# Patient Record
Sex: FEMALE | Race: WHITE | NOT HISPANIC OR LATINO | Employment: OTHER | URBAN - METROPOLITAN AREA
[De-identification: names, ages, dates, MRNs, and addresses within clinical notes are randomized per-mention and may not be internally consistent; named-entity substitution may affect disease eponyms.]

---

## 2017-01-03 ENCOUNTER — TELEPHONE (OUTPATIENT)
Dept: FAMILY MEDICINE CLINIC | Facility: CLINIC | Age: 62
End: 2017-01-03

## 2017-01-12 ENCOUNTER — HOSPITAL ENCOUNTER (OUTPATIENT)
Dept: SLEEP MEDICINE | Facility: HOSPITAL | Age: 62
Discharge: HOME | End: 2017-01-12
Admitting: FAMILY MEDICINE

## 2017-01-12 DIAGNOSIS — G47.10 HYPERSOMNIA: ICD-10-CM

## 2017-01-12 PROCEDURE — 95806 SLEEP STUDY UNATT&RESP EFFT: CPT

## 2017-02-03 ENCOUNTER — TELEPHONE (OUTPATIENT)
Dept: SLEEP MEDICINE | Facility: HOSPITAL | Age: 62
End: 2017-02-03

## 2017-02-03 NOTE — TELEPHONE ENCOUNTER
Spoke with pt about hst results. Per pt she did not use omd device the night of hst.  Pt to call [facility] to s/u r/v with dr. landers.

## 2017-02-27 RX ORDER — ESCITALOPRAM OXALATE 10 MG/1
TABLET ORAL
Qty: 90 TABLET | Refills: 3 | Status: SHIPPED | OUTPATIENT
Start: 2017-02-27 | End: 2018-10-05

## 2017-03-03 ENCOUNTER — OFFICE VISIT (OUTPATIENT)
Dept: FAMILY MEDICINE CLINIC | Facility: CLINIC | Age: 62
End: 2017-03-03

## 2017-03-03 VITALS
DIASTOLIC BLOOD PRESSURE: 82 MMHG | HEART RATE: 82 BPM | SYSTOLIC BLOOD PRESSURE: 126 MMHG | RESPIRATION RATE: 18 BRPM | TEMPERATURE: 98.9 F | WEIGHT: 218 LBS | HEIGHT: 65 IN | OXYGEN SATURATION: 97 % | BODY MASS INDEX: 36.32 KG/M2

## 2017-03-03 DIAGNOSIS — J40 BRONCHITIS: ICD-10-CM

## 2017-03-03 DIAGNOSIS — J44.1 CHRONIC OBSTRUCTIVE PULMONARY DISEASE WITH ACUTE EXACERBATION (HCC): Primary | ICD-10-CM

## 2017-03-03 PROCEDURE — 99213 OFFICE O/P EST LOW 20 MIN: CPT | Performed by: FAMILY MEDICINE

## 2017-03-03 RX ORDER — ESCITALOPRAM OXALATE 10 MG/1
TABLET ORAL
COMMUNITY
Start: 2016-12-01 | End: 2017-09-08 | Stop reason: SDUPTHER

## 2017-03-03 RX ORDER — MELATONIN
COMMUNITY
End: 2017-09-08 | Stop reason: SDUPTHER

## 2017-03-03 RX ORDER — CELECOXIB 200 MG/1
CAPSULE ORAL
COMMUNITY
Start: 2016-05-28 | End: 2017-10-20

## 2017-03-03 RX ORDER — PHENOL 1.4 %
AEROSOL, SPRAY (ML) MUCOUS MEMBRANE
COMMUNITY
End: 2019-08-21

## 2017-03-03 RX ORDER — ALBUTEROL SULFATE 90 UG/1
2 AEROSOL, METERED RESPIRATORY (INHALATION) EVERY 4 HOURS PRN
Qty: 1 INHALER | Refills: 11 | Status: SHIPPED | OUTPATIENT
Start: 2017-03-03 | End: 2017-03-04

## 2017-03-03 RX ORDER — ALBUTEROL SULFATE 90 UG/1
AEROSOL, METERED RESPIRATORY (INHALATION)
COMMUNITY
Start: 2016-10-13 | End: 2017-09-08 | Stop reason: SDUPTHER

## 2017-03-03 RX ORDER — LORATADINE 10 MG/1
TABLET ORAL
COMMUNITY
End: 2019-08-21

## 2017-03-03 RX ORDER — ESCITALOPRAM OXALATE 5 MG/1
5 TABLET ORAL
COMMUNITY
End: 2017-09-08 | Stop reason: SDUPTHER

## 2017-03-03 RX ORDER — MUPIROCIN CALCIUM 20 MG/G
CREAM TOPICAL
COMMUNITY
Start: 2016-12-26 | End: 2017-03-10

## 2017-03-03 RX ORDER — MUPIROCIN CALCIUM 20 MG/G
CREAM TOPICAL
COMMUNITY
Start: 2016-12-26 | End: 2017-09-08 | Stop reason: SDUPTHER

## 2017-03-03 NOTE — PROGRESS NOTES
"Subjective   Emerita Matthews is a 61 y.o. female.     History of Present Illness   Congested cough began a week ago.   in hosp with RSV- his CXR and CT clear, prob \pneumonia.No sore throat. Fjorehead hurts but only when she coughs. No wheeze. No Barboza. Needs combivent and Advair.    The following portions of the patient's history were reviewed and updated as appropriate: allergies, current medications, past social history and problem list.    Review of Systems   Constitutional: Positive for fatigue. Negative for activity change and unexpected weight change.   HENT: Positive for congestion, postnasal drip and sore throat. Negative for ear pain.    Eyes: Negative for pain and discharge.   Respiratory: Positive for cough, shortness of breath and wheezing. Negative for chest tightness.    Cardiovascular: Negative for chest pain and palpitations.   Gastrointestinal: Negative for abdominal pain, diarrhea and vomiting.   Endocrine: Negative.    Genitourinary: Negative.    Musculoskeletal: Negative for joint swelling.   Skin: Negative for color change, rash and wound.   Allergic/Immunologic: Negative.    Neurological: Negative for seizures and syncope.   Psychiatric/Behavioral: Negative.        Objective   Visit Vitals   • /82   • Pulse 82   • Temp 98.9 °F (37.2 °C)   • Resp 18   • Ht 65\" (165.1 cm)   • Wt 218 lb (98.9 kg)   • SpO2 97%   • BMI 36.28 kg/m2     Physical Exam   Constitutional: She appears well-developed and well-nourished.   HENT:   Head: Normocephalic and atraumatic.   Right Ear: Tympanic membrane, external ear and ear canal normal.   Left Ear: Tympanic membrane, external ear and ear canal normal.   Mouth/Throat: Oropharynx is clear and moist.   Eyes: Conjunctivae are normal. Right eye exhibits no discharge. Left eye exhibits no discharge.   Neck: Normal range of motion. Neck supple. No thyromegaly present.   Cardiovascular: Normal rate, regular rhythm and normal heart sounds.    Pulmonary/Chest: " Effort normal and breath sounds normal. No respiratory distress. She has no wheezes. She has no rales.   Lymphadenopathy:     She has no cervical adenopathy.   Skin: Skin is warm and dry.   Psychiatric: She has a normal mood and affect. Judgment normal.   Vitals reviewed.      Assessment/Plan   Problem List Items Addressed This Visit     None      Visit Diagnoses     Chronic obstructive pulmonary disease with acute exacerbation    -  Primary    Relevant Medications    albuterol (PROVENTIL HFA;VENTOLIN HFA) 108 (90 BASE) MCG/ACT inhaler    fluticasone-salmeterol (ADVAIR DISKUS) 250-50 MCG/DOSE DISKUS    loratadine (CLARITIN) 10 MG tablet    HYDROcodone-homatropine (HYCODAN) 5-1.5 MG/5ML syrup    fluticasone-salmeterol (ADVAIR DISKUS) 250-50 MCG/DOSE DISKUS    ipratropium-albuterol (COMBIVENT)  MCG/ACT inhaler    Bronchitis        Relevant Medications    albuterol (PROVENTIL HFA;VENTOLIN HFA) 108 (90 BASE) MCG/ACT inhaler    fluticasone-salmeterol (ADVAIR DISKUS) 250-50 MCG/DOSE DISKUS    loratadine (CLARITIN) 10 MG tablet    HYDROcodone-homatropine (HYCODAN) 5-1.5 MG/5ML syrup    fluticasone-salmeterol (ADVAIR DISKUS) 250-50 MCG/DOSE DISKUS    ipratropium-albuterol (COMBIVENT)  MCG/ACT inhaler

## 2017-03-10 DIAGNOSIS — J44.1 CHRONIC OBSTRUCTIVE PULMONARY DISEASE WITH ACUTE EXACERBATION (HCC): ICD-10-CM

## 2017-09-08 ENCOUNTER — OFFICE VISIT (OUTPATIENT)
Dept: FAMILY MEDICINE CLINIC | Facility: CLINIC | Age: 62
End: 2017-09-08

## 2017-09-08 VITALS
WEIGHT: 221 LBS | OXYGEN SATURATION: 98 % | TEMPERATURE: 99.3 F | HEART RATE: 86 BPM | HEIGHT: 65 IN | DIASTOLIC BLOOD PRESSURE: 70 MMHG | SYSTOLIC BLOOD PRESSURE: 110 MMHG | BODY MASS INDEX: 36.82 KG/M2 | RESPIRATION RATE: 17 BRPM

## 2017-09-08 DIAGNOSIS — J40 BRONCHITIS: ICD-10-CM

## 2017-09-08 DIAGNOSIS — J45.41 REACTIVE AIRWAY DISEASE, MODERATE PERSISTENT, WITH ACUTE EXACERBATION: Primary | ICD-10-CM

## 2017-09-08 DIAGNOSIS — J44.1 CHRONIC OBSTRUCTIVE PULMONARY DISEASE WITH ACUTE EXACERBATION (HCC): ICD-10-CM

## 2017-09-08 PROCEDURE — 99213 OFFICE O/P EST LOW 20 MIN: CPT | Performed by: FAMILY MEDICINE

## 2017-09-08 RX ORDER — PHENOL 1.4 %
AEROSOL, SPRAY (ML) MUCOUS MEMBRANE
COMMUNITY
End: 2017-09-08 | Stop reason: SDUPTHER

## 2017-09-08 RX ORDER — ALBUTEROL SULFATE 90 UG/1
AEROSOL, METERED RESPIRATORY (INHALATION)
COMMUNITY
Start: 2016-10-13 | End: 2017-09-08 | Stop reason: SDUPTHER

## 2017-09-08 RX ORDER — ALBUTEROL SULFATE 90 UG/1
2 AEROSOL, METERED RESPIRATORY (INHALATION) EVERY 4 HOURS PRN
Qty: 1 INHALER | Refills: 11 | Status: SHIPPED | OUTPATIENT
Start: 2017-09-08 | End: 2017-09-08

## 2017-09-08 RX ORDER — LORATADINE 10 MG/1
TABLET ORAL
COMMUNITY
End: 2017-09-08 | Stop reason: SDUPTHER

## 2017-09-08 RX ORDER — MELATONIN
COMMUNITY
End: 2017-09-08 | Stop reason: SDUPTHER

## 2017-09-08 RX ORDER — CELECOXIB 200 MG/1
CAPSULE ORAL
COMMUNITY
Start: 2016-05-28 | End: 2017-09-08 | Stop reason: SDUPTHER

## 2017-09-08 RX ORDER — MUPIROCIN CALCIUM 20 MG/G
CREAM TOPICAL
COMMUNITY
Start: 2016-12-26 | End: 2019-08-21

## 2017-09-08 RX ORDER — ESCITALOPRAM OXALATE 5 MG/1
5 TABLET ORAL
COMMUNITY
End: 2017-09-08 | Stop reason: SDUPTHER

## 2017-09-08 RX ORDER — AZITHROMYCIN 250 MG/1
TABLET, FILM COATED ORAL
Qty: 6 TABLET | Refills: 0 | Status: SHIPPED | OUTPATIENT
Start: 2017-09-08 | End: 2017-10-20

## 2017-09-08 RX ORDER — ESCITALOPRAM OXALATE 10 MG/1
TABLET ORAL
COMMUNITY
Start: 2016-12-01 | End: 2017-09-08 | Stop reason: SDUPTHER

## 2017-09-08 NOTE — PROGRESS NOTES
"Subjective   Emerita Matthews is a 62 y.o. female.     History of Present Illness  Here with a URI. Everyone at home is ill. Sore throat first for three days. Nasal discharge clear. Cough started yesterday and severe in middle night. HA behind eyes. Coughing causes pain behind eyes, and has HA top of head.  Wheezed last night. Has no inhalers at present.  Never smoked.      The following portions of the patient's history were reviewed and updated as appropriate: allergies, current medications, past social history and problem list.    Review of Systems    Objective   /70  Pulse 86  Temp 99.3 °F (37.4 °C)  Resp 17  Ht 65\" (165.1 cm)  Wt 221 lb (100 kg)  SpO2 98%  BMI 36.78 kg/m2  Physical Exam   Constitutional: She appears well-developed and well-nourished.   HENT:   Head: Normocephalic and atraumatic.   Right Ear: Tympanic membrane, external ear and ear canal normal.   Left Ear: Tympanic membrane, external ear and ear canal normal.   Mouth/Throat: Oropharynx is clear and moist.   Eyes: Conjunctivae are normal. Right eye exhibits no discharge. Left eye exhibits no discharge.   Neck: Normal range of motion. Neck supple. No thyromegaly present.   Cardiovascular: Normal rate, regular rhythm and normal heart sounds.    Pulmonary/Chest: Effort normal. No respiratory distress. She has wheezes (expiratory wheeze cheo with forced expiration). She has no rales.   Lymphadenopathy:     She has no cervical adenopathy.   Skin: Skin is warm and dry.   Psychiatric: She has a normal mood and affect. Judgment normal.   Vitals reviewed.      Assessment/Plan   Problem List Items Addressed This Visit     None      Visit Diagnoses     Reactive airway disease, moderate persistent, with acute exacerbation    -  Primary    Chronic obstructive pulmonary disease with acute exacerbation        Relevant Medications    ipratropium-albuterol (COMBIVENT)  MCG/ACT inhaler    fluticasone-salmeterol (ADVAIR DISKUS) 250-50 MCG/DOSE DISKUS "    Bronchitis        Relevant Medications    ipratropium-albuterol (COMBIVENT)  MCG/ACT inhaler    azithromycin (ZITHROMAX) 250 MG tablet    fluticasone-salmeterol (ADVAIR DISKUS) 250-50 MCG/DOSE DISKUS           told to keep inhalers on hand at all times for this sort of situation, and sudden episodes of bronchospasm

## 2017-09-11 ENCOUNTER — TELEPHONE (OUTPATIENT)
Dept: FAMILY MEDICINE CLINIC | Facility: CLINIC | Age: 62
End: 2017-09-11

## 2017-09-11 DIAGNOSIS — J40 BRONCHITIS: Primary | ICD-10-CM

## 2017-09-11 RX ORDER — PREDNISONE 10 MG/1
TABLET ORAL
Qty: 18 TABLET | Refills: 0 | Status: SHIPPED | OUTPATIENT
Start: 2017-09-11 | End: 2017-10-20

## 2017-09-11 NOTE — TELEPHONE ENCOUNTER
Patient was seen called stated she was seen on Friday was told she would have a cough syrup sent to pharmacy that she never received and this morning states her cough is much worse after weekend on antibiotic. Wants to know if she needs to be seen again? What about the cough syrup? Can you phone her in something else?  Not feeling any better.

## 2017-10-20 ENCOUNTER — OFFICE VISIT (OUTPATIENT)
Dept: FAMILY MEDICINE CLINIC | Facility: CLINIC | Age: 62
End: 2017-10-20

## 2017-10-20 VITALS
DIASTOLIC BLOOD PRESSURE: 78 MMHG | HEART RATE: 76 BPM | WEIGHT: 221 LBS | HEIGHT: 65 IN | RESPIRATION RATE: 18 BRPM | SYSTOLIC BLOOD PRESSURE: 118 MMHG | OXYGEN SATURATION: 100 % | BODY MASS INDEX: 36.82 KG/M2

## 2017-10-20 DIAGNOSIS — J45.20 MILD INTERMITTENT REACTIVE AIRWAY DISEASE WITHOUT COMPLICATION: ICD-10-CM

## 2017-10-20 DIAGNOSIS — Z78.0 ASYMPTOMATIC MENOPAUSAL STATE: ICD-10-CM

## 2017-10-20 DIAGNOSIS — B35.9 TINEA: ICD-10-CM

## 2017-10-20 DIAGNOSIS — Z00.00 ANNUAL PHYSICAL EXAM: Primary | ICD-10-CM

## 2017-10-20 DIAGNOSIS — Z12.4 ROUTINE CERVICAL SMEAR: ICD-10-CM

## 2017-10-20 DIAGNOSIS — Z23 ENCOUNTER FOR IMMUNIZATION: ICD-10-CM

## 2017-10-20 DIAGNOSIS — E55.9 VITAMIN D DEFICIENCY: ICD-10-CM

## 2017-10-20 DIAGNOSIS — E55.9 HYPOVITAMINOSIS D: ICD-10-CM

## 2017-10-20 DIAGNOSIS — Z12.11 COLON CANCER SCREENING: ICD-10-CM

## 2017-10-20 DIAGNOSIS — Z12.39 SCREENING FOR BREAST CANCER: ICD-10-CM

## 2017-10-20 PROBLEM — J45.909 REACTIVE AIRWAY DISEASE: Status: ACTIVE | Noted: 2017-10-20

## 2017-10-20 LAB
DEVELOPER EXPIRATION DATE: NORMAL
DEVELOPER LOT NUMBER: NORMAL
EXPIRATION DATE: NORMAL
FECAL OCCULT BLOOD SCREEN, POC: NEGATIVE
Lab: NORMAL
NEGATIVE CONTROL: NEGATIVE
POSITIVE CONTROL: POSITIVE

## 2017-10-20 PROCEDURE — 90732 PPSV23 VACC 2 YRS+ SUBQ/IM: CPT | Performed by: FAMILY MEDICINE

## 2017-10-20 PROCEDURE — 90656 IIV3 VACC NO PRSV 0.5 ML IM: CPT | Performed by: FAMILY MEDICINE

## 2017-10-20 PROCEDURE — 82274 ASSAY TEST FOR BLOOD FECAL: CPT | Performed by: FAMILY MEDICINE

## 2017-10-20 PROCEDURE — 90472 IMMUNIZATION ADMIN EACH ADD: CPT | Performed by: FAMILY MEDICINE

## 2017-10-20 PROCEDURE — 99396 PREV VISIT EST AGE 40-64: CPT | Performed by: FAMILY MEDICINE

## 2017-10-20 PROCEDURE — 90471 IMMUNIZATION ADMIN: CPT | Performed by: FAMILY MEDICINE

## 2017-10-20 RX ORDER — ALBUTEROL SULFATE 90 UG/1
AEROSOL, METERED RESPIRATORY (INHALATION)
Qty: 1 INHALER | Refills: 0 | COMMUNITY
Start: 2017-10-20 | End: 2019-08-21

## 2017-10-20 NOTE — PROGRESS NOTES
"Subjective   Emerita Matthews is a 62 y.o. female.     History of Present Illness Here for a CPE.  Last Pap was 3 yr ago.    Gets instant diarrhea when goes out, but not on a reg basis.  Wonders why her last dx was COPD. She had thought her dx was asthma.  Only uses Advair when she gets a cough.This happened maybe 2 times in the last year.  Had been started on combivent by Dr Kim in maybe 2001.  Never has smoked and no hx of lung dis. No RIZZO.    3 mo ago fell and R hand near the first and second digits were swollen and bruised.   Doing better now.    Not exercising.     The following portions of the patient's history were reviewed and updated as appropriate: allergies, current medications, past social history and problem list.    Review of Systems   Constitutional: Negative for appetite change, fever and unexpected weight change.   HENT: Negative for ear pain, facial swelling and sore throat.    Eyes: Negative for pain and visual disturbance.   Respiratory: Negative for chest tightness, shortness of breath and wheezing.    Cardiovascular: Negative for chest pain and palpitations.   Gastrointestinal: Negative for abdominal pain and blood in stool.   Endocrine: Negative.    Genitourinary: Negative for difficulty urinating and hematuria.   Musculoskeletal: Negative for joint swelling.   Neurological: Negative for tremors, seizures and syncope.   Hematological: Negative for adenopathy.   Psychiatric/Behavioral: Negative.        Objective   /78  Pulse 76  Resp 18  Ht 65\" (165.1 cm)  Wt 221 lb (100 kg)  SpO2 100%  BMI 36.78 kg/m2  Physical Exam   Constitutional: She is oriented to person, place, and time. She appears well-developed and well-nourished. No distress.   HENT:   Head: Normocephalic and atraumatic. Hair is normal.   Right Ear: Hearing, tympanic membrane, external ear and ear canal normal. No drainage. No decreased hearing is noted.   Left Ear: Hearing, tympanic membrane, external ear and ear canal " normal. No decreased hearing is noted.   Nose: No nasal deformity.   Mouth/Throat: Oropharynx is clear and moist.   Eyes: Conjunctivae, EOM and lids are normal. Pupils are equal, round, and reactive to light. Right eye exhibits no discharge. Left eye exhibits no discharge.   Neck: Normal range of motion. Neck supple. No JVD present. No tracheal deviation present. No thyromegaly present.   Cardiovascular: Normal rate, regular rhythm, normal heart sounds, intact distal pulses and normal pulses.  Exam reveals no gallop and no friction rub.    No murmur heard.  Pulmonary/Chest: Effort normal and breath sounds normal. No respiratory distress. She has no wheezes. She has no rales. She exhibits no tenderness. Right breast exhibits no mass, no skin change and no tenderness. Left breast exhibits no mass, no skin change and no tenderness.   Abdominal: Soft. Bowel sounds are normal. She exhibits no distension and no mass. There is no tenderness. There is no rebound and no guarding. No hernia.   Genitourinary: Vagina normal and uterus normal.   Genitourinary Comments: Atrophic changes. SPP performed   Musculoskeletal: Normal range of motion. She exhibits no edema, tenderness or deformity.   Lymphadenopathy:     She has no cervical adenopathy.   Neurological: She is alert and oriented to person, place, and time. She has normal reflexes. She displays normal reflexes. No cranial nerve deficit. She exhibits normal muscle tone. Coordination normal.   Skin: Skin is warm and dry. No rash noted. She is not diaphoretic. No erythema.   Psychiatric: She has a normal mood and affect. Her behavior is normal. Judgment and thought content normal.   Vitals reviewed.      Assessment/Plan   Problem List Items Addressed This Visit        Respiratory    Reactive airway disease    Relevant Medications    albuterol (PROVENTIL HFA;VENTOLIN HFA) 108 (90 Base) MCG/ACT inhaler       Digestive    Vitamin D deficiency      Other Visit Diagnoses     Annual  physical exam    -  Primary    Relevant Orders    CBC & Differential    Comprehensive Metabolic Panel    Lipid Panel With / Chol / HDL Ratio    TSH    Hepatitis C Antibody    Tinea        Relevant Medications    triamcinolone (KENALOG) 0.1 % ointment    Colon cancer screening        Relevant Orders    POC Occult Blood Stool    Hypovitaminosis D        Relevant Orders    Vitamin D 25 Hydroxy    Asymptomatic menopausal state        Relevant Orders    DEXA Bone Density Axial    Screening for breast cancer        Relevant Orders    Mammo Screening Bilateral With CAD    Encounter for immunization        Relevant Orders    Flu Vaccine Quad PF >18YR    Pneumococcal Polysaccharide Vaccine 23-Valent Greater Than or Equal To 3yo Subcutaneous / IM    Routine cervical smear        Relevant Orders    Pap IG, Ct-Ng - ThinPrep Vial, Cervix           Get records from Dr Lencho manuel of COPD.  Encourage exercise

## 2017-10-21 LAB — HCV AB S/CO SERPL IA: 0.1 S/CO RATIO (ref 0–0.9)

## 2017-10-23 DIAGNOSIS — D64.9 ANEMIA, UNSPECIFIED TYPE: Primary | ICD-10-CM

## 2017-10-23 LAB
25(OH)D3+25(OH)D2 SERPL-MCNC: 31.9 NG/ML (ref 30–100)
ALBUMIN SERPL-MCNC: 4.2 G/DL (ref 3.5–5.2)
ALBUMIN/GLOB SERPL: 1.4 G/DL
ALP SERPL-CCNC: 56 U/L (ref 39–117)
ALT SERPL-CCNC: 23 U/L (ref 1–33)
AST SERPL-CCNC: 17 U/L (ref 1–32)
BILIRUB SERPL-MCNC: 0.5 MG/DL (ref 0.1–1.2)
BUN SERPL-MCNC: 13 MG/DL (ref 8–23)
BUN/CREAT SERPL: 15.7 (ref 7–25)
CALCIUM SERPL-MCNC: 9.5 MG/DL (ref 8.6–10.5)
CHLORIDE SERPL-SCNC: 103 MMOL/L (ref 98–107)
CHOLEST SERPL-MCNC: 182 MG/DL (ref 0–200)
CHOLEST/HDLC SERPL: 2.53 {RATIO}
CO2 SERPL-SCNC: 27.2 MMOL/L (ref 22–29)
CREAT SERPL-MCNC: 0.83 MG/DL (ref 0.57–1)
GFR SERPLBLD CREATININE-BSD FMLA CKD-EPI: 70 ML/MIN/1.73
GFR SERPLBLD CREATININE-BSD FMLA CKD-EPI: 84 ML/MIN/1.73
GLOBULIN SER CALC-MCNC: 3 GM/DL
GLUCOSE SERPL-MCNC: 97 MG/DL (ref 65–99)
HCT VFR BLD AUTO: (no result) %
HDLC SERPL-MCNC: 72 MG/DL (ref 40–60)
HGB BLD-MCNC: (no result) G/DL
LDLC SERPL CALC-MCNC: 96 MG/DL (ref 0–100)
PLATELET # BLD AUTO: (no result) 10*3/UL
POTASSIUM SERPL-SCNC: 5.1 MMOL/L (ref 3.5–5.2)
PROT SERPL-MCNC: 7.2 G/DL (ref 6–8.5)
RBC # BLD AUTO: (no result) 10*6/UL
REQUEST PROBLEM: NORMAL
SODIUM SERPL-SCNC: 143 MMOL/L (ref 136–145)
TRIGL SERPL-MCNC: 68 MG/DL (ref 0–150)
TSH SERPL DL<=0.005 MIU/L-ACNC: 1.24 MIU/ML (ref 0.27–4.2)
VLDLC SERPL CALC-MCNC: 13.6 MG/DL (ref 5–40)
WBC # BLD AUTO: (no result) 10*3/MM3

## 2017-10-25 ENCOUNTER — RESULTS ENCOUNTER (OUTPATIENT)
Dept: FAMILY MEDICINE CLINIC | Facility: CLINIC | Age: 62
End: 2017-10-25

## 2017-10-25 DIAGNOSIS — Z00.00 ANNUAL PHYSICAL EXAM: ICD-10-CM

## 2017-10-26 LAB
C TRACH RRNA CVX QL NAA+PROBE: NEGATIVE
CYTOLOGIST CVX/VAG CYTO: NORMAL
CYTOLOGY CVX/VAG DOC THIN PREP: NORMAL
DX ICD CODE: NORMAL
HIV 1 & 2 AB SER-IMP: NORMAL
N GONORRHOEA RRNA CVX QL NAA+PROBE: NEGATIVE
OTHER STN SPEC: NORMAL
PATH REPORT.FINAL DX SPEC: NORMAL
STAT OF ADQ CVX/VAG CYTO-IMP: NORMAL

## 2017-11-02 ENCOUNTER — TELEPHONE (OUTPATIENT)
Dept: FAMILY MEDICINE CLINIC | Facility: CLINIC | Age: 62
End: 2017-11-02

## 2017-12-01 ENCOUNTER — TELEPHONE (OUTPATIENT)
Dept: FAMILY MEDICINE CLINIC | Facility: CLINIC | Age: 62
End: 2017-12-01

## 2017-12-01 ENCOUNTER — HOSPITAL ENCOUNTER (OUTPATIENT)
Dept: BONE DENSITY | Facility: HOSPITAL | Age: 62
Discharge: HOME | End: 2017-12-01
Admitting: FAMILY MEDICINE

## 2017-12-01 ENCOUNTER — HOSPITAL ENCOUNTER (OUTPATIENT)
Dept: MAMMOGRAPHY | Facility: HOSPITAL | Age: 62
Discharge: HOME | End: 2017-12-01

## 2017-12-01 DIAGNOSIS — Z78.0 ASYMPTOMATIC MENOPAUSAL STATE: ICD-10-CM

## 2017-12-01 DIAGNOSIS — Z12.39 SCREENING FOR BREAST CANCER: ICD-10-CM

## 2017-12-01 PROCEDURE — 77080 DXA BONE DENSITY AXIAL: CPT

## 2017-12-01 PROCEDURE — G0202 SCR MAMMO BI INCL CAD: HCPCS

## 2017-12-02 LAB
BASOPHILS # BLD AUTO: 0.01 10*3/MM3 (ref 0–0.2)
BASOPHILS NFR BLD AUTO: 0.2 % (ref 0–1.5)
DIFFERENTIAL COMMENT: NORMAL
EOSINOPHIL # BLD AUTO: 0.18 10*3/MM3 (ref 0–0.7)
EOSINOPHIL NFR BLD AUTO: 3.1 % (ref 0.3–6.2)
ERYTHROCYTE [DISTWIDTH] IN BLOOD BY AUTOMATED COUNT: 14.5 % (ref 11.7–13)
HBA1C MFR BLD: 5.37 % (ref 4.8–5.6)
HCT VFR BLD AUTO: 45.9 % (ref 35.6–45.5)
HGB BLD-MCNC: 14.6 G/DL (ref 11.9–15.5)
IMM GRANULOCYTES # BLD: 0 10*3/MM3 (ref 0–0.03)
IMM GRANULOCYTES NFR BLD: 0 % (ref 0–0.5)
LYMPHOCYTES # BLD AUTO: 2.17 10*3/MM3 (ref 0.9–4.8)
LYMPHOCYTES NFR BLD AUTO: 37 % (ref 19.6–45.3)
MCH RBC QN AUTO: 29.5 PG (ref 26.9–32)
MCHC RBC AUTO-ENTMCNC: 31.8 G/DL (ref 32.4–36.3)
MCV RBC AUTO: 92.7 FL (ref 80.5–98.2)
MONOCYTES # BLD AUTO: 0.51 10*3/MM3 (ref 0.2–1.2)
MONOCYTES NFR BLD AUTO: 8.7 % (ref 5–12)
NEUTROPHILS # BLD AUTO: 2.99 10*3/MM3 (ref 1.9–8.1)
NEUTROPHILS NFR BLD AUTO: 51 % (ref 42.7–76)
NRBC BLD AUTO-RTO: 0 /100 WBC (ref 0–0)
PLATELET # BLD AUTO: 211 10*3/MM3 (ref 140–500)
PLATELET BLD QL SMEAR: NORMAL
RBC # BLD AUTO: 4.95 10*6/MM3 (ref 3.9–5.2)
RBC MORPH BLD: NORMAL
WBC # BLD AUTO: 5.86 10*3/MM3 (ref 4.5–10.7)

## 2018-01-23 ENCOUNTER — TRANSCRIBE ORDERS (OUTPATIENT)
Dept: OCCUPATIONAL THERAPY | Facility: CLINIC | Age: 63
End: 2018-01-23

## 2018-01-23 ENCOUNTER — TREATMENT (OUTPATIENT)
Dept: PHYSICAL THERAPY | Facility: CLINIC | Age: 63
End: 2018-01-23

## 2018-01-23 DIAGNOSIS — IMO0001 CLOSED TRAUMATIC PIP DISLOCATION, INITIAL ENCOUNTER: Primary | ICD-10-CM

## 2018-01-23 DIAGNOSIS — IMO0001: ICD-10-CM

## 2018-01-23 DIAGNOSIS — S63.259D DISLOCATION OF FINGER, SUBSEQUENT ENCOUNTER: Primary | ICD-10-CM

## 2018-01-23 PROCEDURE — 97530 THERAPEUTIC ACTIVITIES: CPT | Performed by: PHYSICAL THERAPIST

## 2018-01-23 PROCEDURE — 97110 THERAPEUTIC EXERCISES: CPT | Performed by: PHYSICAL THERAPIST

## 2018-01-23 PROCEDURE — 97140 MANUAL THERAPY 1/> REGIONS: CPT | Performed by: PHYSICAL THERAPIST

## 2018-01-23 PROCEDURE — 97001 PR PHYS THERAPY EVALUATION: CPT | Performed by: PHYSICAL THERAPIST

## 2018-01-23 NOTE — PROGRESS NOTES
Orthopedic / Sports / Industrial Physical Therapy  Physical Therapy Initial Evaluation and Plan of Care    Patient Name: Emerita Matthews          :  1955  Referring Physician: NOEMI Jaimes  Diagnosis: Dislocation of finger, subsequent encounter [S64.646A]    Date of Evaluation: 2018  ______________________________________________________________________    Subjective Evaluation    History of Present Illness  Date of onset: 2017  Mechanism of injury: Tripped and fell at work - [employer] -     S/P Dislocation / Reduction LRF PIP Jt      Patient Occupation: Dental assistant - for 42 yrs   Precautions and Work Restrictions: See MD note - Pain  Current pain ratin  At worst pain ratin  Location: LRF and LSF and hand (unlar aspect) - Denies feeling of instability -   Quality: Achiness; Soreness -   Alleviating factors: Rest.  Exacerbated by: Use of (L) Hand  - especially Gripping.  Progression: improved    Diagnostic Tests  X-ray: normal (Initial X-Ray showed dislocated PIP Jt LRF; Subsequent XRays were WNL with good alignment of LRF PIP Jt - )    Patient Goals  Patient/family treatment goals: Pain alleviation; Normal mobility, strength, function, and RTW w/o restrictions -          ___________________________________________________  Objective       Observations     Additional Observation Details  Flexed PIPJ and swollen LRF    Active Range of Motion     Additional Active Range of Motion Details  LRF :  MPJ:  WF/NL             PIPJ:  15/90-deg             DIPJ:   0/60-deg    All other Finger, Wrist, Forearm AROM WNL     Strength/Myotome Testing     Additional Strength Details  :  (L) 23#;  (R) 43#    Tests     Additional Tests Details  LRF PIPJ;  (-) Valgus / Varus; (-) HE stress testing    Swelling     Left Wrist/Hand     Additional Swelling Details  LRF PIP:  6.5cm;    RRF PIP: 5.5cm     MODALITIES: E-Stim (IFC) (L) Hand / (MN/UN) w/ MHP x 20 min  MANUAL THERAPY: x 15: Edema mobilization (L)  fingers/hand; STM to (L) hand / forearm;   THERAPEUTIC EXERCISE: x 25 min; B/B LRF DIP/PIP;  Tendon Gliding (3) x 20 ea; Hand therapy Orbs x 10 min; Rice work x 10 min  FUNCTIONAL ACTIVITIES: X 10 min  · TAPING / BRACING:   · Jt protection, ADL modification; Posture and ; Stretching modifications to prevent future dislocation;     ___________________________________________________  Assessment & Plan     Assessment  Assessment details: S/P LRF PIPJ Dislocation / Reduction 12/18/2017    PROBLEMS: Pain; limited mobility, strength, function, and unable to do normal job -   PROGNOSIS: Good    GOALS:   SHORT TERM GOALS: 2 weeks:  1) HEP Initiated; 2) Pain decreased 50%:   3) AROM  increased:  4) Improved functional ability grossly;     LONG TERM GOALS: 4 weeks (or at time of DISCHARGE): 1) (I) HEP; 2) AROM WFL and pain free; 3) Strength / mobility to be able to perform all ADL's and job-related activities w/o restrictions;       Plan  Planned therapy interventions: flexibility, home exercise program, joint mobilization, manual therapy, neuromuscular re-education, orthotic fitting/training, soft tissue mobilization, strengthening, stretching and therapeutic activities (Modalities prn; Taping / bracing prn; )  Frequency: 3x week  Duration in weeks: 4  Treatment plan discussed with: patient    ___________________________________________________  Manual Therapy:    15     mins  74174;   Therapeutic Exercise:    25     mins  58849;     Neuromuscular Carl:        mins  55456;   Therapeutic Activity:     10     mins  19141;     Ultrasound:          mins  29428;    Electrical Stimulation:   20     mins  54341 ( );  Dry Needling          mins self-pay   Gait Training:          mins  56129;  EVAL TIME:   25 mins    Timed Treatment:   50   mins                Total Treatment:     105   mins    PT SIGNATURE:   Sony Thomas, PIPPA  DATE TREATMENT INITIATED:  1/23/2018  ___________________________________________________  Initial Certification  Certification Period: 4/23/2018  I certify that the therapy services are furnished while this patient is under my care.  The services outlined above are required by this patient, and will be reviewed every 90 days.     PHYSICIAN: ________________________________  DATE: ______  NOEMI Jaimes        Please sign and return via fax to [phone].. Thank you, [facility].  ______________________________________________________________________  [address] [place] [address]  Phone: [phone] Fax: [phone]

## 2018-01-26 ENCOUNTER — TREATMENT (OUTPATIENT)
Dept: PHYSICAL THERAPY | Facility: CLINIC | Age: 63
End: 2018-01-26

## 2018-01-26 DIAGNOSIS — IMO0001: Primary | ICD-10-CM

## 2018-01-26 DIAGNOSIS — S63.259D DISLOCATION OF FINGER, SUBSEQUENT ENCOUNTER: ICD-10-CM

## 2018-01-26 PROCEDURE — 97140 MANUAL THERAPY 1/> REGIONS: CPT | Performed by: PHYSICAL THERAPIST

## 2018-01-26 PROCEDURE — 97110 THERAPEUTIC EXERCISES: CPT | Performed by: PHYSICAL THERAPIST

## 2018-01-26 PROCEDURE — 97018 PARAFFIN BATH THERAPY: CPT | Performed by: PHYSICAL THERAPIST

## 2018-01-29 ENCOUNTER — TREATMENT (OUTPATIENT)
Dept: PHYSICAL THERAPY | Facility: CLINIC | Age: 63
End: 2018-01-29

## 2018-01-29 DIAGNOSIS — IMO0001: Primary | ICD-10-CM

## 2018-01-29 DIAGNOSIS — S63.259D DISLOCATION OF FINGER, SUBSEQUENT ENCOUNTER: ICD-10-CM

## 2018-01-29 PROCEDURE — 97140 MANUAL THERAPY 1/> REGIONS: CPT | Performed by: PHYSICAL THERAPIST

## 2018-01-29 PROCEDURE — 97530 THERAPEUTIC ACTIVITIES: CPT | Performed by: PHYSICAL THERAPIST

## 2018-01-29 PROCEDURE — 97110 THERAPEUTIC EXERCISES: CPT | Performed by: PHYSICAL THERAPIST

## 2018-01-29 NOTE — PROGRESS NOTES
Physical Therapy Daily Progress Note    Patient Name: Emerita Matthews         :  1955  Referring Physician: NOEMI Jaimes    Subjective   Emerita Matthews reports: decreasing pain and improved mobility - Most limited with LRF PIPJ extension - Pain in PIP and DIPJ LRF and soreness in LSF diffusely -     Objective   Swelling at PIPJ LRF with flexed posturing -     See Exercise, Manual, and Modality Logs for complete treatment.     Functional / Therapeutic Activities:   min  · TAPING / BRACING: NA  · Jt protection, ADL modification; Posture and      Assessment/Plan  S/P LRF PIPJ Dislocation / Reduction 2017  Improving mobility into flexion with some residual limitation into PIPJ extension LRF     Progress strengthening /stabilization /functional activity     _________________________________________________  Manual Therapy:    15     mins  89272;  Therapeutic Exercise:    30     mins  31102;     Neuromuscular Carl:        mins  81314;    Therapeutic Activity:          mins  63997;     Parafin:      15     mins  75877;     Ultrasound:          mins  92052;    Electrical Stimulation:         mins  05714 ( );  Dry Needling          mins self-pay    Timed Treatment:   60   mins                  Total Treatment:     70   mins    Sony Thomas PT  Physical Therapist

## 2018-01-31 ENCOUNTER — TREATMENT (OUTPATIENT)
Dept: PHYSICAL THERAPY | Facility: CLINIC | Age: 63
End: 2018-01-31

## 2018-01-31 DIAGNOSIS — IMO0001: Primary | ICD-10-CM

## 2018-01-31 DIAGNOSIS — S63.259D DISLOCATION OF FINGER, SUBSEQUENT ENCOUNTER: ICD-10-CM

## 2018-01-31 PROCEDURE — 97110 THERAPEUTIC EXERCISES: CPT | Performed by: PHYSICAL THERAPIST

## 2018-01-31 PROCEDURE — 97014 ELECTRIC STIMULATION THERAPY: CPT | Performed by: PHYSICAL THERAPIST

## 2018-01-31 PROCEDURE — 97530 THERAPEUTIC ACTIVITIES: CPT | Performed by: PHYSICAL THERAPIST

## 2018-02-01 ENCOUNTER — TREATMENT (OUTPATIENT)
Dept: PHYSICAL THERAPY | Facility: CLINIC | Age: 63
End: 2018-02-01

## 2018-02-01 DIAGNOSIS — S63.259D DISLOCATION OF FINGER, SUBSEQUENT ENCOUNTER: ICD-10-CM

## 2018-02-01 DIAGNOSIS — IMO0001: Primary | ICD-10-CM

## 2018-02-01 PROCEDURE — 97014 ELECTRIC STIMULATION THERAPY: CPT | Performed by: PHYSICAL THERAPIST

## 2018-02-01 PROCEDURE — 97530 THERAPEUTIC ACTIVITIES: CPT | Performed by: PHYSICAL THERAPIST

## 2018-02-01 PROCEDURE — 97110 THERAPEUTIC EXERCISES: CPT | Performed by: PHYSICAL THERAPIST

## 2018-02-01 NOTE — PROGRESS NOTES
Physical Therapy Daily Progress Note     Patient Name: Emerita Matthews         :  1955  Referring Physician: NOEMI Jaimes     Subjective   Emerita Matthews reports: increased discomfort LRF and LSF - More active - Most limited with LRF PIPJ extension - Pain in PIP and DIPJ LRF and soreness in LSF diffusely -      Objective   Swelling at PIPJ LRF with flexed posturing -   Able to achieve full LRF PIPJ extension with gentle stretching / mobilization -      See Exercise, Manual, and Modality Logs for complete treatment.      Functional / Therapeutic Activities: 10  min  · TAPING / BRACING: Fitted Pt with static splint dorsally to facilitate LRF PIPJ extension and instructed in use / wearing schedule for night and during the day as needed   · Jt protection, ADL modification; Posture and       Assessment/Plan  S/P LRF PIPJ Dislocation / Reduction 2017  Improving mobility into flexion with some residual limitation into PIPJ extension LRF   May benefit from LRF PIPJ static bracing to facilitate ext     Progress strengthening /stabilization /functional activity  _________________________________________________  Manual Therapy:                      15     mins  28429;  Therapeutic Exercise:              30     mins  52537;     Neuromuscular Carl:        mins  31363;    Therapeutic Activity:               10      mins  62290;     Parafin:                                     15     mins  33316;     Ultrasound:                                      mins  10282;    Electrical Stimulation:                   mins  65960 (MC );  Dry Needling                                           mins self-pay     Timed Treatment:   55   mins                  Total Treatment:     80   mins     Sony Thomas PT  Physical Therapist

## 2018-02-04 NOTE — PROGRESS NOTES
Physical Therapy Daily Progress Note      Patient Name: Emerita Matthews         :  1955  Referring Physician: NOEMI Jaimes      Subjective   Emerita Matthews reports: continued improvement with decreased pain and improved mobility and function.   increased discomfort LRF at night while wearing her brace  Most limited with LRF PIPJ extension - Pain in PIP and DIPJ LRF but better - LRF PIPJ easier to get full extended with less effort  -       Objective   Swelling at PIPJ LRF with flexed posturing -   Able to achieve full LRF PIPJ extension with gentle stretching / mobilization -       See Exercise, Manual, and Modality Logs for complete treatment.      Functional / Therapeutic Activities: 15  min  · TAPING / BRACING: NA  · SEE EXERCISE FLOW SHEET -   · Jt protection, ADL modification; Posture and        Assessment/Plan  S/P LRF PIPJ Dislocation / Reduction 2017  Improving mobility into flexion with some residual limitation into PIPJ extension LRF   May benefit from LRF PIPJ static bracing to facilitate ext      Progress strengthening /stabilization /functional activity  _________________________________________________  Manual Therapy:                      05     mins  10375;  Therapeutic Exercise:              30     mins  83616;     Neuromuscular Carl:                              mins  36257;    Therapeutic Activity:               15      mins  44213;     Parafin:                                          mins  48867;     Ultrasound:                                      mins  69392;    Electrical Stimulation:             20      mins  90038 ( );  Dry Needling                                           mins self-pay      Timed Treatment:   50   mins                  Total Treatment:     80   mins      Sony Thomas PT  Physical Therapist

## 2018-02-04 NOTE — PROGRESS NOTES
Physical Therapy Daily Progress Note      Patient Name: Emerita Matthews         :  1955  Referring Physician: NOEMI Jaimes      Subjective   Emerita Matthews reports: continued improvement with decreased pain and improved mobility and function.   increased discomfort LRF at night while wearing her brace  Most limited with LRF PIPJ extension - Pain in PIP and DIPJ LRF but better - LRF PIPJ easier to get full extended with less effort  -       Objective   Swelling at PIPJ LRF with flexed posturing -   Able to achieve full LRF PIPJ extension with gentle stretching / mobilization -       See Exercise, Manual, and Modality Logs for complete treatment.      Functional / Therapeutic Activities: 15  min  · TAPING / BRACING: NA  · SEE EXERCISE FLOW SHEET -   · Jt protection, ADL modification; Posture and        Assessment/Plan  S/P LRF PIPJ Dislocation / Reduction 2017  Improving mobility into flexion with some residual limitation into PIPJ extension LRF   May benefit from LRF PIPJ static bracing to facilitate ext      Progress strengthening /stabilization /functional activity  _________________________________________________  Manual Therapy:                      05     mins  54016;  Therapeutic Exercise:              30     mins  26883;     Neuromuscular Carl:         mins  19745;    Therapeutic Activity:               15      mins  23569;     Parafin:                                          mins  39274;     Ultrasound:                                      mins  83331;    Electrical Stimulation:             20      mins  26714 ( );  Dry Needling                                           mins self-pay      Timed Treatment:   50   mins                  Total Treatment:     80   mins      Sony Thomas PT  Physical Therapist

## 2018-02-05 ENCOUNTER — TREATMENT (OUTPATIENT)
Dept: PHYSICAL THERAPY | Facility: CLINIC | Age: 63
End: 2018-02-05

## 2018-02-05 DIAGNOSIS — S63.259D DISLOCATION OF FINGER, SUBSEQUENT ENCOUNTER: ICD-10-CM

## 2018-02-05 DIAGNOSIS — IMO0001: Primary | ICD-10-CM

## 2018-02-05 PROCEDURE — 97530 THERAPEUTIC ACTIVITIES: CPT | Performed by: PHYSICAL THERAPIST

## 2018-02-05 PROCEDURE — 97110 THERAPEUTIC EXERCISES: CPT | Performed by: PHYSICAL THERAPIST

## 2018-02-08 NOTE — PROGRESS NOTES
------------------------------------------------------------------------------------------------------   MD PROGRESS NOTE      Patient: Emerita Matthews        : 1955  Diagnosis/ICD-10 Code:  Closed traumatic dislocation of proximal interphalangeal joint, subsequent encounter [S63.289D]  Referring practitioner: NOEMI Jaimes  Date of Initial Visit: 2018                  Today's Date: 2018  _________________________________________________________________    Thank you for the referral of Ms. Matthews to [facility].  Ms. Matthews has attended 6 PT sessions and their treatment has consisted of: modalities prn, manual therapy, therapeutic exercise, therapeutic activiites, bracing, patient education, and HEP.     Subjective   Emerita Matthews reports: improved mobility with persistent flexed posture of LRF PIPJ, but stretches out quickly - Persistent pain in LRF > LSF -  ___________________________________________________________________  Objective              OBSERVATION: Flexed posture of LRF PIPJ with swelling at PIPJ -               PALPATION: Tender LRF PIPJ > DIP        AROM: LRF MPJ WNL:  PIPJ 20 / ; DIP WFL   STRENGTH:  (L): 30#;  (R) 55#   SPECIAL TESTS: (-) Valgus /Varus stress testing -    ACTIVITY TOLERANCE: Improved tolerance to ADL's and job requirements, but persistent pain limiting functional use of LRF / Hand     See Exercise, Manual, and Modality Logs for complete treatment.      Functional / Therapeutic Activities:  X 25 min  · TAPING / BRACING: NA  ·  SEE EXERCISE FLOW SHEET -   ·  FUNCTIONAL ASSESSMENT -   · Jt protection, ADL modification; Posture and    ___________________________________________________________________   Assessment/Plan  S/P LRF PIPJ Dislocation / Reduction 2017  Improving mobility into flexion with some residual limitation into PIPJ extension LRF and persistent pain    Ms. Matthews would benefit from continued Physical Therapy and  referral to Hand Specialists if pain persists - .     P: Pt reports continued improvement with decreased pain and improved mobility and function with  referral to Hand Specialists if pain persists.      Please advise after your exam.    Thank you again for this referral of Ms. Matthews to [facility].    PT Signature: ______________________________   Sony Thomas, PT    ______________________________________________________________________  [address] [place] [address]  Phone: [phone] Fax: [phone]

## 2018-03-02 ENCOUNTER — TREATMENT (OUTPATIENT)
Dept: PHYSICAL THERAPY | Facility: CLINIC | Age: 63
End: 2018-03-02

## 2018-03-02 ENCOUNTER — TRANSCRIBE ORDERS (OUTPATIENT)
Dept: PHYSICAL THERAPY | Facility: CLINIC | Age: 63
End: 2018-03-02

## 2018-03-02 DIAGNOSIS — S63.259D DISLOCATION OF FINGER, SUBSEQUENT ENCOUNTER: Primary | ICD-10-CM

## 2018-03-02 DIAGNOSIS — IMO0001: ICD-10-CM

## 2018-03-02 PROCEDURE — L3925 FO PIP DIP JNT/SPRNG PRE OTS: HCPCS | Performed by: PHYSICAL THERAPIST

## 2018-03-05 NOTE — PROGRESS NOTES
Hand Therapy Splint Evaluation and Plan of Treatment    Patient Name: Emerita Matthews       Patient MRN: [id number]  : 1955  Physician: Rodrigo Chiu MD  Date: 3/2/2018  No diagnosis found.    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 Pain persists despite PT.   Objective: AROM: flexion contracture of PIP joint, Sensation is normal. Strength NA secondary to injury.    Splinting:  [x] Patient was measured and fit with a prefabricated Capener splint  [x] Patient was instructed in wearing schedule, precautions and care of the splint during this visit  [x] Patient was instructed in proper donning/doffing of splint    Assessment:  [x] Patient was fitted and appropriate splint was fabricated this date  [x] Patient reported that splint was comfortable and had no complications with the fit of splint  [x] Patient was instructed and patient verbalized understanding of precautions, wear and care of the splint  [x] Patient demonstrated independent donning and doffing of splint during treatment today    Goals:  [x] Patient was fitted properly with appropriate splint for diagnosis  [x] Patient was educated on precautions, wear schedule and care of the splint  [x] Patient demonstrated independence with donning and doffing of splint  [x] Splint was provide to [] protect healing structures [x] increase ROM []  Restrict mobility []  Improve joint alignment      Plan:  [x] No additional treatment is required for this patient at this time. The patient is therefore discharged from therapy.  [x] Patient advised to contact therapist with any additional questions or concerns regarding the fit and function of the splint  [x] Patient will be seen for fitting, adjustments, training in precautions, wear or care of splint    PT SIGNATURE: Gavino Wilkins, PT DPT, DPT  DATE TREATMENT INITIATED: 3/5/2018    Initial Certification    I certify that the  therapy services are furnished while this patient is under my care.  The services outlined above are required by this patient, and will be reviewed every 90 days.     PHYSICIAN:      DATE:     Please sign and return via fax to [phone].. Thank you, [facility].

## 2018-10-05 ENCOUNTER — OFFICE VISIT (OUTPATIENT)
Dept: FAMILY MEDICINE CLINIC | Facility: CLINIC | Age: 63
End: 2018-10-05

## 2018-10-05 ENCOUNTER — FLU SHOT (OUTPATIENT)
Dept: FAMILY MEDICINE CLINIC | Facility: CLINIC | Age: 63
End: 2018-10-05

## 2018-10-05 VITALS
SYSTOLIC BLOOD PRESSURE: 120 MMHG | RESPIRATION RATE: 16 BRPM | HEIGHT: 65 IN | OXYGEN SATURATION: 99 % | BODY MASS INDEX: 35.72 KG/M2 | TEMPERATURE: 98.4 F | DIASTOLIC BLOOD PRESSURE: 70 MMHG | WEIGHT: 214.4 LBS | HEART RATE: 73 BPM

## 2018-10-05 DIAGNOSIS — J45.909 REACTIVE AIRWAY DISEASE WITHOUT COMPLICATION, UNSPECIFIED ASTHMA SEVERITY, UNSPECIFIED WHETHER PERSISTENT: ICD-10-CM

## 2018-10-05 DIAGNOSIS — E55.9 VITAMIN D DEFICIENCY: Primary | ICD-10-CM

## 2018-10-05 DIAGNOSIS — Z23 NEED FOR IMMUNIZATION AGAINST INFLUENZA: Primary | ICD-10-CM

## 2018-10-05 PROCEDURE — 90674 CCIIV4 VAC NO PRSV 0.5 ML IM: CPT | Performed by: INTERNAL MEDICINE

## 2018-10-05 PROCEDURE — 99214 OFFICE O/P EST MOD 30 MIN: CPT | Performed by: INTERNAL MEDICINE

## 2018-10-05 PROCEDURE — 90471 IMMUNIZATION ADMIN: CPT | Performed by: INTERNAL MEDICINE

## 2018-10-05 RX ORDER — FLUTICASONE PROPIONATE 50 MCG
2 SPRAY, SUSPENSION (ML) NASAL DAILY
Qty: 16 G | Refills: 2 | Status: SHIPPED | OUTPATIENT
Start: 2018-10-05 | End: 2019-08-21

## 2018-10-05 NOTE — PATIENT INSTRUCTIONS
Exercising to Lose Weight  Exercising can help you to lose weight. In order to lose weight through exercise, you need to do vigorous-intensity exercise. You can tell that you are exercising with vigorous intensity if you are breathing very hard and fast and cannot hold a conversation while exercising.  Moderate-intensity exercise helps to maintain your current weight. You can tell that you are exercising at a moderate level if you have a higher heart rate and faster breathing, but you are still able to hold a conversation.  How often should I exercise?  Choose an activity that you enjoy and set realistic goals. Your health care provider can help you to make an activity plan that works for you. Exercise regularly as directed by your health care provider. This may include:  · Doing resistance training twice each week, such as:  ? Push-ups.  ? Sit-ups.  ? Lifting weights.  ? Using resistance bands.  · Doing a given intensity of exercise for a given amount of time. Choose from these options:  ? 150 minutes of moderate-intensity exercise every week.  ? 75 minutes of vigorous-intensity exercise every week.  ? A mix of moderate-intensity and vigorous-intensity exercise every week.    Children, pregnant women, people who are out of shape, people who are overweight, and older adults may need to consult a health care provider for individual recommendations. If you have any sort of medical condition, be sure to consult your health care provider before starting a new exercise program.  What are some activities that can help me to lose weight?  · Walking at a rate of at least 4.5 miles an hour.  · Jogging or running at a rate of 5 miles per hour.  · Biking at a rate of at least 10 miles per hour.  · Lap swimming.  · Roller-skating or in-line skating.  · Cross-country skiing.  · Vigorous competitive sports, such as football, basketball, and soccer.  · Jumping rope.  · Aerobic dancing.  How can I be more active in my  day-to-day activities?  · Use the stairs instead of the elevator.  · Take a walk during your lunch break.  · If you drive, park your car farther away from work or school.  · If you take public transportation, get off one stop early and walk the rest of the way.  · Make all of your phone calls while standing up and walking around.  · Get up, stretch, and walk around every 30 minutes throughout the day.  What guidelines should I follow while exercising?  · Do not exercise so much that you hurt yourself, feel dizzy, or get very short of breath.  · Consult your health care provider prior to starting a new exercise program.  · Wear comfortable clothes and shoes with good support.  · Drink plenty of water while you exercise to prevent dehydration or heat stroke. Body water is lost during exercise and must be replaced.  · Work out until you breathe faster and your heart beats faster.  This information is not intended to replace advice given to you by your health care provider. Make sure you discuss any questions you have with your health care provider.  Document Released: 01/20/2012 Document Revised: 05/25/2017 Document Reviewed: 05/21/2015  [employer] Interactive Patient Education © 2018 [employer]      MyPlate from [employer]  The general, healthful diet is based on the 2010 Dietary Guidelines for Americans. The amount of food you need to eat from each food group depends on your age, sex, and level of physical activity and can be individualized by a dietitian. Go to ChooseMyPlate.gov for more information.  What do I need to know about the MyPlate plan?  · Enjoy your food, but eat less.  · Avoid oversized portions.  ? ½ of your plate should include fruits and vegetables.  ? ¼ of your plate should be grains.  ? ¼ of your plate should be protein.  Grains  · Make at least half of your grains whole grains.  · For a 2,000 calorie daily food plan, eat 6 oz every day.  · 1 oz is about 1 slice bread, 1 cup cereal, or ½ cup cooked  rice, cereal, or pasta.  Vegetables  · Make half your plate fruits and vegetables.  · For a 2,000 calorie daily food plan, eat 2½ cups every day.  · 1 cup is about 1 cup raw or cooked vegetables or vegetable juice or 2 cups raw leafy greens.  Fruits  · Make half your plate fruits and vegetables.  · For a 2,000 calorie daily food plan, eat 2 cups every day.  · 1 cup is about 1 cup fruit or 100% fruit juice or ½ cup dried fruit.  Protein  · For a 2,000 calorie daily food plan, eat 5½ oz every day.  · 1 oz is about 1 oz meat, poultry, or fish, ¼ cup cooked beans, 1 egg, 1 Tbsp peanut butter, or ½ oz nuts or seeds.  Dairy  · Switch to fat-free or low-fat (1%) milk.  · For a 2,000 calorie daily food plan, eat 3 cups every day.  · 1 cup is about 1 cup milk or yogurt or soy milk (soy beverage), 1½ oz natural cheese, or 2 oz processed cheese.  Fats, Oils, and Empty Calories  · Only small amounts of oils are recommended.  · Empty calories are calories from solid fats or added sugars.  · Compare sodium in foods like soup, bread, and frozen meals. Choose the foods with lower numbers.  · Drink water instead of sugary drinks.  What foods can I eat?  Grains  Whole grains such as whole wheat, quinoa, millet, and bulgur. Bread, rolls, and pasta made from whole grains. Brown or wild rice. Hot or cold cereals made from whole grains and without added sugar.  Vegetables  All fresh vegetables, especially fresh red, dark green, or orange vegetables. Peas and beans. Low-sodium frozen or canned vegetables prepared without added salt. Low-sodium vegetable juices.  Fruits  All fresh, frozen, and dried fruits. Canned fruit packed in water or fruit juice without added sugar. Fruit juices without added sugar.  Meats and Other Protein Sources  Boiled, baked, or grilled lean meat trimmed of fat. Skinless poultry. Fresh seafood and shellfish. Canned seafood packed in water. Unsalted nuts and unsalted nut butters. Tofu. Dried beans and pea.  Eggs.  Dairy  Low-fat or fat-free milk, yogurt, and cheeses.  Sweets and Desserts  Frozen desserts made from low-fat milk.  Fats and Oils  Olive, peanut, and canola oils and margarine. Salad dressing and mayonnaise made from these oils.  Other  Soups and casseroles made from allowed ingredients and without added fat or salt.  The items listed above may not be a complete list of recommended foods or beverages. Contact your dietitian for more options.  What foods are not recommended?  Grains  Sweetened, low-fiber cereals. Packaged baked goods. Snack crackers and chips. Cheese crackers, butter crackers, and biscuits. Frozen waffles, sweet breads, doughnuts, pastries, packaged baking mixes, pancakes, cakes, and cookies.  Vegetables  Regular canned or frozen vegetables or vegetables prepared with salt. Canned tomatoes. Canned tomato sauce. Fried vegetables. Vegetables in cream sauce or cheese sauce.  Fruits  Fruits packed in syrup or made with added sugar.  Meats and Other Protein Sources  Marbled or fatty meats such as ribs. Poultry with skin. Fried meats, poultry, eggs, or fish. Sausages, hot dogs, and deli meats such as pastrami, bologna, or salami.  Dairy  Whole milk, cream, cheeses made from whole milk, sour cream. Ice cream or yogurt made from whole milk or with added sugar.  Beverages  For adults, no more than one alcoholic drink per day. Regular soft drinks or other sugary beverages. Juice drinks.  Sweets and Desserts  Sugary or fatty desserts, candy, and other sweets.  Fats and Oils  Solid shortening or partially hydrogenated oils. Solid margarine. Margarine that contains trans fats. Butter.  The items listed above may not be a complete list of foods and beverages to avoid. Contact your dietitian for more information.  This information is not intended to replace advice given to you by your health care provider. Make sure you discuss any questions you have with your health care provider.  Document Released:  01/06/2009 Document Revised: 05/25/2017 Document Reviewed: 11/26/2014  [employer] Interactive Patient Education © 2018 [employer]      Calorie Counting for Weight Loss  Calories are units of energy. Your body needs a certain amount of calories from food to keep you going throughout the day. When you eat more calories than your body needs, your body stores the extra calories as fat. When you eat fewer calories than your body needs, your body burns fat to get the energy it needs.  Calorie counting means keeping track of how many calories you eat and drink each day. Calorie counting can be helpful if you need to lose weight. If you make sure to eat fewer calories than your body needs, you should lose weight. Ask your health care provider what a healthy weight is for you.  For calorie counting to work, you will need to eat the right number of calories in a day in order to lose a healthy amount of weight per week. A dietitian can help you determine how many calories you need in a day and will give you suggestions on how to reach your calorie goal.  · A healthy amount of weight to lose per week is usually 1-2 lb (0.5-0.9 kg). This usually means that your daily calorie intake should be reduced by 500-750 calories.  · Eating 1,200 - 1,500 calories per day can help most women lose weight.  · Eating 1,500 - 1,800 calories per day can help most men lose weight.    What do I need to know about calorie counting?  In order to meet your daily calorie goal, you will need to:  · Find out how many calories are in each food you would like to eat. Try to do this before you eat.  · Decide how much of the food you plan to eat.  · Write down what you ate and how many calories it had. Doing this is called keeping a food log.    To successfully lose weight, it is important to balance calorie counting with a healthy lifestyle that includes regular activity. Aim for 150 minutes of moderate exercise (such as walking) or 75 minutes of vigorous  exercise (such as running) each week.  Where do I find calorie information?    The number of calories in a food can be found on a Nutrition Facts label. If a food does not have a Nutrition Facts label, try to look up the calories online or ask your dietitian for help.  Remember that calories are listed per serving. If you choose to have more than one serving of a food, you will have to multiply the calories per serving by the amount of servings you plan to eat. For example, the label on a package of bread might say that a serving size is 1 slice and that there are 90 calories in a serving. If you eat 1 slice, you will have eaten 90 calories. If you eat 2 slices, you will have eaten 180 calories.  How do I keep a food log?  Immediately after each meal, record the following information in your food log:  · What you ate. Don't forget to include toppings, sauces, and other extras on the food.  · How much you ate. This can be measured in cups, ounces, or number of items.  · How many calories each food and drink had.  · The total number of calories in the meal.    Keep your food log near you, such as in a small notebook in your pocket, or use a mobile sherly or website. Some programs will calculate calories for you and show you how many calories you have left for the day to meet your goal.  What are some calorie counting tips?  · Use your calories on foods and drinks that will fill you up and not leave you hungry:  ? Some examples of foods that fill you up are nuts and nut butters, vegetables, lean proteins, and high-fiber foods like whole grains. High-fiber foods are foods with more than 5 g fiber per serving.  ? Drinks such as sodas, specialty coffee drinks, alcohol, and juices have a lot of calories, yet do not fill you up.  · Eat nutritious foods and avoid empty calories. Empty calories are calories you get from foods or beverages that do not have many vitamins or protein, such as candy, sweets, and soda. It is better to  "have a nutritious high-calorie food (such as an avocado) than a food with few nutrients (such as a bag of chips).  · Know how many calories are in the foods you eat most often. This will help you calculate calorie counts faster.  · Pay attention to calories in drinks. Low-calorie drinks include water and unsweetened drinks.  · Pay attention to nutrition labels for \"low fat\" or \"fat free\" foods. These foods sometimes have the same amount of calories or more calories than the full fat versions. They also often have added sugar, starch, or salt, to make up for flavor that was removed with the fat.  · Find a way of tracking calories that works for you. Get creative. Try different apps or programs if writing down calories does not work for you.  What are some portion control tips?  · Know how many calories are in a serving. This will help you know how many servings of a certain food you can have.  · Use a measuring cup to measure serving sizes. You could also try weighing out portions on a kitchen scale. With time, you will be able to estimate serving sizes for some foods.  · Take some time to put servings of different foods on your favorite plates, bowls, and cups so you know what a serving looks like.  · Try not to eat straight from a bag or box. Doing this can lead to overeating. Put the amount you would like to eat in a cup or on a plate to make sure you are eating the right portion.  · Use smaller plates, glasses, and bowls to prevent overeating.  · Try not to multitask (for example, watch TV or use your computer) while eating. If it is time to eat, sit down at a table and enjoy your food. This will help you to know when you are full. It will also help you to be aware of what you are eating and how much you are eating.  What are tips for following this plan?  Reading food labels  · Check the calorie count compared to the serving size. The serving size may be smaller than what you are used to eating.  · Check the " source of the calories. Make sure the food you are eating is high in vitamins and protein and low in saturated and trans fats.  Shopping  · Read nutrition labels while you shop. This will help you make healthy decisions before you decide to purchase your food.  · Make a grocery list and stick to it.  Cooking  · Try to cook your favorite foods in a healthier way. For example, try baking instead of frying.  · Use low-fat dairy products.  Meal planning  · Use more fruits and vegetables. Half of your plate should be fruits and vegetables.  · Include lean proteins like poultry and fish.  How do I count calories when eating out?  · Ask for smaller portion sizes.  · Consider sharing an entree and sides instead of getting your own entree.  · If you get your own entree, eat only half. Ask for a box at the beginning of your meal and put the rest of your entree in it so you are not tempted to eat it.  · If calories are listed on the menu, choose the lower calorie options.  · Choose dishes that include vegetables, fruits, whole grains, low-fat dairy products, and lean protein.  · Choose items that are boiled, broiled, grilled, or steamed. Stay away from items that are buttered, battered, fried, or served with cream sauce. Items labeled “crispy” are usually fried, unless stated otherwise.  · Choose water, low-fat milk, unsweetened iced tea, or other drinks without added sugar. If you want an alcoholic beverage, choose a lower calorie option such as a glass of wine or light beer.  · Ask for dressings, sauces, and syrups on the side. These are usually high in calories, so you should limit the amount you eat.  · If you want a salad, choose a garden salad and ask for grilled meats. Avoid extra toppings like venegas, cheese, or fried items. Ask for the dressing on the side, or ask for olive oil and vinegar or lemon to use as dressing.  · Estimate how many servings of a food you are given. For example, a serving of cooked rice is ½ cup  or about the size of half a baseball. Knowing serving sizes will help you be aware of how much food you are eating at restaurants. The list below tells you how big or small some common portion sizes are based on everyday objects:  ? 1 oz--4 stacked dice.  ? 3 oz--1 deck of cards.  ? 1 tsp--1 die.  ? 1 Tbsp--½ a ping-pong ball.  ? 2 Tbsp--1 ping-pong ball.  ? ½ cup--½ baseball.  ? 1 cup--1 baseball.  Summary  · Calorie counting means keeping track of how many calories you eat and drink each day. If you eat fewer calories than your body needs, you should lose weight.  · A healthy amount of weight to lose per week is usually 1-2 lb (0.5-0.9 kg). This usually means reducing your daily calorie intake by 500-750 calories.  · The number of calories in a food can be found on a Nutrition Facts label. If a food does not have a Nutrition Facts label, try to look up the calories online or ask your dietitian for help.  · Use your calories on foods and drinks that will fill you up, and not on foods and drinks that will leave you hungry.  · Use smaller plates, glasses, and bowls to prevent overeating.  This information is not intended to replace advice given to you by your health care provider. Make sure you discuss any questions you have with your health care provider.  Document Released: 12/18/2006 Document Revised: 11/17/2017 Document Reviewed: 11/17/2017  [employer] Interactive Patient Education © 2018 [employer]    Serving Sizes  A serving size is a measured amount of food or drink, such as one slice of bread, that has an associated nutrient content. Knowing the serving size of a food or drink can help you determine how much of that food you should consume.  What is the size of one serving?  The size of one healthy serving depends on the food or drink. To determine a serving size, read the food label. If the food or drink does not have a food label, try to find serving size information online. Or, use the following to  estimate the size of one adult serving:  Grain  1 slice bread. ½ bagel. ½ cup pasta.  Vegetable  ½ cup cooked or canned vegetables. 1 cup raw, leafy greens.  Fruit  ½ cup canned fruit. 1 medium fruit. ¼ cup dried fruit.  Meat and Other Protein Sources  1 oz meat, poultry, or fish. ¼ cup cooked beans. 1 egg. ¼ cup nuts or seeds. 1 Tbsp nut butter. ¼ cup tofu or tempeh. 2 Tbsp hummus.  Dairy  An individual container of yogurt (6-8 oz). 1 piece of cheese the size of your thumb (1 oz). 1 cup (8 oz) milk or milk alternative.  Fat  A piece the size of one dice. 1 tsp soft margarine. 1 Tbsp mayonnaise. 1 tsp vegetable oil. 1 Tbsp regular salad dressing. 2 Tbsp low-fat salad dressing.  How many servings should I eat from each food group each day?  The following are the suggested number of servings to try and have every day from each food group. You can also look at your eating throughout the week and aim for meeting these requirements on most days for overall healthy eating.  Grain  6-8 servings. Try to have half of your grains from whole grains, such as whole wheat bread, corn tortillas, oatmeal, brown rice, whole wheat pasta, and bulgur.  Vegetable  At least 2½-3 servings.  Fruit  2 servings.  Meat and Other Protein Foods  5-6 servings. Aim to have lean proteins, such as chicken, turkey, fish, beans, or tofu.  Dairy  3 servings. Choose low-fat or nonfat if you are trying to control your weight.  Fat  2-3 servings.  Is a serving the same thing as a portion?  No. A portion is the actual amount you eat, which may be more than one serving. Knowing the specific serving size of a food and the nutritional information that goes with it can help you make a healthy decision on what size portion to eat.  What are some tips to help me learn healthy serving sizes?  · Check food labels for serving sizes. Many foods that come as a single portion actually contain multiple servings.  · Determine the serving size of foods you commonly eat  and figure out how large a portion you usually eat.  · Measure the number of servings that can be held by the bowls, glasses, cups, and plates you typically use. For example, pour your breakfast cereal into your regular bowl and then pour it into a measuring cup.  · For 1-2 days, measure the serving sizes of all the foods you eat.  · Practice estimating serving sizes and determining how big your portions should be.  This information is not intended to replace advice given to you by your health care provider. Make sure you discuss any questions you have with your health care provider.  Document Released: 09/15/2004 Document Revised: 08/12/2017 Document Reviewed: 03/17/2015  [employer] Interactive Patient Education © 2018 [employer]

## 2018-10-05 NOTE — PROGRESS NOTES
Subjective   Emerita Matthews is a 63 y.o. female who comes in today for   Chief Complaint   Patient presents with   • Allergies     would like advair, flovent and flonase.    • TRANSFER NEW PATIENT   .    History of Present Illness   Here as a transfer patient from Dr. Cheek.  Was referred to me by Dr. Tavares.  She works at a dental office .  mammo [id number] and is due in 2018.  Often gets a cough in the fall and she will take flonase, combivent and advair and that helps her a lot.  Has had a sleep study and did not have sleep apnea.  Does snore.    Dad  of supranuclear palsy and brother has PD.    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  Also her arm swing with gait is off at times.     The following portions of the patient's history were reviewed and updated as appropriate: allergies, current medications, past family history, past medical history, past social history, past surgical history and problem list.    Review of Systems   Constitutional: Negative.    Respiratory: Negative.    Cardiovascular: Negative.    Psychiatric/Behavioral: Negative.        Vitals:    10/05/18 1118   BP: 120/70   Pulse: 73   Resp: 16   Temp: 98.4 °F (36.9 °C)   SpO2: 99%       Objective   Physical Exam   Constitutional: She is oriented to person, place, and time. She appears well-developed and well-nourished.   HENT:   Head: Normocephalic and atraumatic.   Right Ear: External ear normal.   Left Ear: External ear normal.   Mouth/Throat: Oropharynx is clear and moist.   Eyes: Conjunctivae are normal.   Neck: Neck supple.   Cardiovascular: Normal rate, regular rhythm and normal heart sounds.    No bruits   Pulmonary/Chest: Effort normal and breath sounds normal. No respiratory distress. She has no wheezes. She has no rales.   Abdominal: Soft. Bowel sounds are normal. She exhibits no distension and no mass. There is no tenderness.   Lymphadenopathy:     She has no cervical adenopathy.   Neurological: She is alert and oriented to person, place,  and time.   Skin: Skin is warm.   Psychiatric: She has a normal mood and affect. Her behavior is normal. Judgment and thought content normal.   Nursing note and vitals reviewed.      Assessment/Plan   Emerita was seen today for allergies and transfer new patient.    Diagnoses and all orders for this visit:    Vitamin D deficiency  -     Comprehensive Metabolic Panel  -     Vitamin D 25 Hydroxy  -     TSH    Reactive airway disease without complication, unspecified asthma severity, unspecified whether persistent  -     Comprehensive Metabolic Panel  -     Vitamin D 25 Hydroxy  -     TSH    Other orders  -     fluticasone-salmeterol (ADVAIR DISKUS) 250-50 MCG/DOSE DISKUS; Inhale 1 puff 2 (Two) Times a Day.  -     ipratropium-albuterol (COMBIVENT RESPIMAT)  MCG/ACT inhaler; Inhale 1 puff 4 (Four) Times a Day As Needed for Wheezing.  -     fluticasone (FLONASE) 50 MCG/ACT nasal spray; 2 sprays into the nostril(s) as directed by provider Daily.      Start advair, combivent and flonase for seasonal fall allergies and all meds sent to pharmacy  Labs ordered  Her HM is up to date and will need repeat pap in 2019 along with dexa  mammo in dec 2018  Weight loss and exercise discussed and h/o given to patient at d/c               I have asked for the patient to return to clinic in 6month(s).

## 2018-10-06 LAB
25(OH)D3+25(OH)D2 SERPL-MCNC: 29.6 NG/ML (ref 30–100)
ALBUMIN SERPL-MCNC: 4.6 G/DL (ref 3.5–5.2)
ALBUMIN/GLOB SERPL: 1.5 G/DL
ALP SERPL-CCNC: 63 U/L (ref 39–117)
ALT SERPL-CCNC: 15 U/L (ref 1–33)
AST SERPL-CCNC: 8 U/L (ref 1–32)
BILIRUB SERPL-MCNC: 0.6 MG/DL (ref 0.1–1.2)
BUN SERPL-MCNC: 12 MG/DL (ref 8–23)
BUN/CREAT SERPL: 14.3 (ref 7–25)
CALCIUM SERPL-MCNC: 9.5 MG/DL (ref 8.6–10.5)
CHLORIDE SERPL-SCNC: 105 MMOL/L (ref 98–107)
CO2 SERPL-SCNC: 26.8 MMOL/L (ref 22–29)
CREAT SERPL-MCNC: 0.84 MG/DL (ref 0.57–1)
GLOBULIN SER CALC-MCNC: 3 GM/DL
GLUCOSE SERPL-MCNC: 95 MG/DL (ref 65–99)
POTASSIUM SERPL-SCNC: 4.1 MMOL/L (ref 3.5–5.2)
PROT SERPL-MCNC: 7.6 G/DL (ref 6–8.5)
SODIUM SERPL-SCNC: 146 MMOL/L (ref 136–145)
TSH SERPL DL<=0.005 MIU/L-ACNC: 1.16 MIU/ML (ref 0.27–4.2)

## 2018-10-10 ENCOUNTER — TELEPHONE (OUTPATIENT)
Dept: FAMILY MEDICINE CLINIC | Facility: CLINIC | Age: 63
End: 2018-10-10

## 2018-10-10 RX ORDER — AMOXICILLIN AND CLAVULANATE POTASSIUM 875; 125 MG/1; MG/1
1 TABLET, FILM COATED ORAL 2 TIMES DAILY
Qty: 20 TABLET | Refills: 0 | Status: SHIPPED | OUTPATIENT
Start: 2018-10-10 | End: 2019-07-19

## 2018-10-10 NOTE — TELEPHONE ENCOUNTER
After seeing you on Friday patients cough has not gotten any better really. Still coughing up green phlegm and not getting any sleep at night.    Would like something sent to her [facility] pharmacy

## 2018-10-11 ENCOUNTER — OFFICE VISIT (OUTPATIENT)
Dept: FAMILY MEDICINE CLINIC | Facility: CLINIC | Age: 63
End: 2018-10-11

## 2018-10-11 VITALS
TEMPERATURE: 98.4 F | SYSTOLIC BLOOD PRESSURE: 120 MMHG | DIASTOLIC BLOOD PRESSURE: 74 MMHG | BODY MASS INDEX: 36.15 KG/M2 | WEIGHT: 217 LBS | OXYGEN SATURATION: 95 % | HEIGHT: 65 IN | HEART RATE: 78 BPM

## 2018-10-11 DIAGNOSIS — J20.9 ACUTE BRONCHITIS, UNSPECIFIED ORGANISM: Primary | ICD-10-CM

## 2018-10-11 PROCEDURE — 99214 OFFICE O/P EST MOD 30 MIN: CPT | Performed by: NURSE PRACTITIONER

## 2018-10-11 RX ORDER — DEXTROMETHORPHAN HYDROBROMIDE AND PROMETHAZINE HYDROCHLORIDE 15; 6.25 MG/5ML; MG/5ML
5 SYRUP ORAL 4 TIMES DAILY PRN
Qty: 120 ML | Refills: 1 | Status: SHIPPED | OUTPATIENT
Start: 2018-10-11 | End: 2019-08-21

## 2018-10-11 NOTE — PATIENT INSTRUCTIONS
Caution with cough syrup possible sedation  If chest pain shortness of breath high fever emergency room  Recheck if not improving in for 5 days sooner for any persistent fever  Or urgent care ER as needed    Push fluids  Start Augmentin

## 2018-10-15 NOTE — PROGRESS NOTES
Subjective   Emerita Matthews is a 63 y.o. female.     Patient complains cough congestion  Increased cough at night difficulty sleeping no chest pain presently no shortness of breath no fevers no chills  Symptoms several days  OTC medication not helping  Needs something to help her sleep and stop coughing    Nonsmoker no history of lung disease      Cough   Associated symptoms include postnasal drip. Pertinent negatives include no fever or shortness of breath.        The following portions of the patient's history were reviewed and updated as appropriate: allergies, current medications, past family history, past social history, past surgical history and problem list.    Review of Systems   Constitutional: Negative for fever.   HENT: Positive for postnasal drip.    Respiratory: Positive for cough. Negative for shortness of breath.    All other systems reviewed and are negative.      Objective   Physical Exam   Constitutional: She is oriented to person, place, and time. She appears well-developed and well-nourished. No distress.   HENT:   Head: Normocephalic.   Turbinates congested pharynx clear   Eyes: Pupils are equal, round, and reactive to light. Conjunctivae are normal.   Neck: Neck supple.   Cardiovascular: Normal rate, regular rhythm and normal heart sounds.  Exam reveals no friction rub.    No murmur heard.  Pulmonary/Chest: Effort normal and breath sounds normal. No respiratory distress. She has no wheezes.   Musculoskeletal: She exhibits no edema.   Neurological: She is alert and oriented to person, place, and time.   Skin: Skin is warm and dry. She is not diaphoretic.   Psychiatric: She has a normal mood and affect. Her behavior is normal. Judgment and thought content normal.   Vitals reviewed.        Assessment/Plan   Emerita was seen today for cough.    Diagnoses and all orders for this visit:    Acute bronchitis, unspecified organism    Other orders  -     promethazine-dextromethorphan (PROMETHAZINE-DM) 6.25-15  MG/5ML syrup; Take 5 mL by mouth 4 (Four) Times a Day As Needed for Cough.                    Caution with cough syrup possible sedation  If chest pain shortness of breath high fever emergency room  Recheck if not improving in for 5 days sooner for any persistent fever  Or urgent care ER as needed    Push fluids  Start Augmentin if not improved 7 days  Or worsening symptoms  If chest pain shortness of breath high fever emergency room

## 2018-11-07 ENCOUNTER — TELEPHONE (OUTPATIENT)
Dept: FAMILY MEDICINE CLINIC | Facility: CLINIC | Age: 63
End: 2018-11-07

## 2018-11-07 NOTE — TELEPHONE ENCOUNTER
When patient saw you last month she stated that she was not talking the lexapro that sanju oliveira prescribed. But now that she has been without it she is realizing that maybe she should have stayed on it.    She would like a prescription for lexapro 10mg sent to [facility].

## 2018-11-08 RX ORDER — ESCITALOPRAM OXALATE 10 MG/1
10 TABLET ORAL DAILY
Qty: 90 TABLET | Refills: 1 | Status: SHIPPED | OUTPATIENT
Start: 2018-11-08 | End: 2019-09-09 | Stop reason: SDUPTHER

## 2019-07-19 ENCOUNTER — OFFICE VISIT (OUTPATIENT)
Dept: FAMILY MEDICINE CLINIC | Facility: CLINIC | Age: 64
End: 2019-07-19

## 2019-07-19 VITALS
SYSTOLIC BLOOD PRESSURE: 138 MMHG | TEMPERATURE: 98.4 F | RESPIRATION RATE: 14 BRPM | OXYGEN SATURATION: 96 % | BODY MASS INDEX: 35.74 KG/M2 | HEART RATE: 87 BPM | DIASTOLIC BLOOD PRESSURE: 90 MMHG | WEIGHT: 214.8 LBS

## 2019-07-19 DIAGNOSIS — R31.21 ASYMPTOMATIC MICROSCOPIC HEMATURIA: Primary | ICD-10-CM
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
## 2019-07-19 LAB
BILIRUB BLD-MCNC: NEGATIVE MG/DL
CLARITY, POC: CLEAR
COLOR UR: YELLOW
GLUCOSE UR STRIP-MCNC: NEGATIVE MG/DL
KETONES UR QL: NEGATIVE
LEUKOCYTE EST, POC: ABNORMAL
NITRITE UR-MCNC: NEGATIVE MG/ML
PH UR: 5.5 [PH] (ref 5–8)
PROT UR STRIP-MCNC: NEGATIVE MG/DL
RBC # UR STRIP: ABNORMAL /UL
SP GR UR: 1.03 (ref 1–1.03)
UROBILINOGEN UR QL: NORMAL

## 2019-07-19 PROCEDURE — 99213 OFFICE O/P EST LOW 20 MIN: CPT | Performed by: NURSE PRACTITIONER

## 2019-07-19 PROCEDURE — 81003 URINALYSIS AUTO W/O SCOPE: CPT | Performed by: NURSE PRACTITIONER
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
## 2019-07-22 LAB
BACTERIA UR CULT: ABNORMAL
BACTERIA UR CULT: ABNORMAL
OTHER ANTIBIOTIC SUSC ISLT: ABNORMAL

## 2019-07-22 RX ORDER — NITROFURANTOIN 25; 75 MG/1; MG/1
100 CAPSULE ORAL 2 TIMES DAILY
Qty: 14 CAPSULE | Refills: 0 | Status: SHIPPED | OUTPATIENT
Start: 2019-07-22 | End: 2019-08-21

## 2019-08-08 ENCOUNTER — TELEPHONE (OUTPATIENT)
Dept: FAMILY MEDICINE CLINIC | Facility: CLINIC | Age: 64
End: 2019-08-08

## 2019-08-08 RX ORDER — CYCLOBENZAPRINE HCL 10 MG
10 TABLET ORAL 3 TIMES DAILY PRN
Qty: 30 TABLET | Refills: 0 | Status: SHIPPED | OUTPATIENT
Start: 2019-08-08 | End: 2019-10-18

## 2019-08-08 NOTE — TELEPHONE ENCOUNTER
I will send in a muscle relaxer for her.  What interaction is she concerned with for advil and levaquin?  Is it lowering seizure risk?  Or is there something going on with stomach, or liver?
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
## 2019-08-21 ENCOUNTER — OFFICE VISIT (OUTPATIENT)
Dept: FAMILY MEDICINE CLINIC | Facility: CLINIC | Age: 64
End: 2019-08-21

## 2019-08-21 ENCOUNTER — LAB (OUTPATIENT)
Dept: LAB | Facility: HOSPITAL | Age: 64
End: 2019-08-21

## 2019-08-21 ENCOUNTER — HOSPITAL ENCOUNTER (OUTPATIENT)
Dept: CARDIOLOGY | Facility: HOSPITAL | Age: 64
Discharge: HOME | End: 2019-08-21
Admitting: ANESTHESIOLOGY

## 2019-08-21 ENCOUNTER — TRANSCRIBE ORDERS (OUTPATIENT)
Dept: ADMINISTRATIVE | Facility: HOSPITAL | Age: 64
End: 2019-08-21

## 2019-08-21 VITALS
BODY MASS INDEX: 33.82 KG/M2 | HEART RATE: 63 BPM | DIASTOLIC BLOOD PRESSURE: 80 MMHG | OXYGEN SATURATION: 97 % | WEIGHT: 203 LBS | TEMPERATURE: 98.7 F | SYSTOLIC BLOOD PRESSURE: 110 MMHG | HEIGHT: 65 IN

## 2019-08-21 DIAGNOSIS — E83.59 NEPHROCALCINOSIS: ICD-10-CM

## 2019-08-21 DIAGNOSIS — N10 PYELONEPHRITIS, ACUTE: ICD-10-CM

## 2019-08-21 DIAGNOSIS — Z12.31 ENCOUNTER FOR SCREENING MAMMOGRAM FOR BREAST CANCER: ICD-10-CM

## 2019-08-21 DIAGNOSIS — Z87.898 HISTORY OF BACTEREMIA: ICD-10-CM

## 2019-08-21 DIAGNOSIS — Z01.818 PRE-OP TESTING: ICD-10-CM

## 2019-08-21 DIAGNOSIS — Z01.818 PRE-OP TESTING: Primary | ICD-10-CM

## 2019-08-21 DIAGNOSIS — M54.2 NECK PAIN: Primary | ICD-10-CM
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
## 2019-08-21 DIAGNOSIS — N29 NEPHROCALCINOSIS: ICD-10-CM

## 2019-08-21 LAB
ANION GAP SERPL CALCULATED.3IONS-SCNC: 6.6 MMOL/L (ref 5–15)
BUN BLD-MCNC: 12 MG/DL (ref 8–23)
BUN/CREAT SERPL: 17.1 (ref 7–25)
CALCIUM SPEC-SCNC: 9.4 MG/DL (ref 8.6–10.5)
CHLORIDE SERPL-SCNC: 104 MMOL/L (ref 98–107)
CO2 SERPL-SCNC: 27.4 MMOL/L (ref 22–29)
CREAT BLD-MCNC: 0.7 MG/DL (ref 0.57–1)
GFR SERPL CREATININE-BSD FRML MDRD: 84 ML/MIN/1.73
GLUCOSE BLD-MCNC: 84 MG/DL (ref 65–99)
POTASSIUM BLD-SCNC: 4.2 MMOL/L (ref 3.5–5.2)
SODIUM BLD-SCNC: 138 MMOL/L (ref 136–145)

## 2019-08-21 PROCEDURE — 99214 OFFICE O/P EST MOD 30 MIN: CPT | Performed by: INTERNAL MEDICINE

## 2019-08-21 PROCEDURE — 36415 COLL VENOUS BLD VENIPUNCTURE: CPT

## 2019-08-21 PROCEDURE — 93010 ELECTROCARDIOGRAM REPORT: CPT | Performed by: INTERNAL MEDICINE

## 2019-08-21 PROCEDURE — 80048 BASIC METABOLIC PNL TOTAL CA: CPT

## 2019-08-21 PROCEDURE — 93005 ELECTROCARDIOGRAM TRACING: CPT | Performed by: ANESTHESIOLOGY
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
## 2019-08-22 LAB
APPEARANCE UR: CLEAR
BACTERIA #/AREA URNS HPF: ABNORMAL /HPF
BILIRUB UR QL STRIP: NEGATIVE
CASTS URNS MICRO: ABNORMAL
CASTS URNS QL MICRO: PRESENT /LPF
COLOR UR: YELLOW
EPI CELLS #/AREA URNS HPF: ABNORMAL /HPF
GLUCOSE UR QL: NEGATIVE
HGB UR QL STRIP: NEGATIVE
KETONES UR QL STRIP: NEGATIVE
LEUKOCYTE ESTERASE UR QL STRIP: NEGATIVE
MICRO URNS: NORMAL
MICRO URNS: NORMAL
MUCOUS THREADS URNS QL MICRO: PRESENT /HPF
NITRITE UR QL STRIP: NEGATIVE
PH UR STRIP: 5.5 [PH] (ref 5–7.5)
PROT UR QL STRIP: NEGATIVE
RBC #/AREA URNS HPF: ABNORMAL /HPF
SP GR UR: 1.01 (ref 1–1.03)
URINALYSIS REFLEX: NORMAL
UROBILINOGEN UR STRIP-MCNC: 0.2 MG/DL (ref 0.2–1)
WBC #/AREA URNS HPF: ABNORMAL /HPF

## 2019-08-29 ENCOUNTER — TRANSCRIBE ORDERS (OUTPATIENT)
Dept: ADMINISTRATIVE | Facility: HOSPITAL | Age: 64
End: 2019-08-29

## 2019-08-29 ENCOUNTER — HOSPITAL ENCOUNTER (OUTPATIENT)
Dept: MAMMOGRAPHY | Facility: HOSPITAL | Age: 64
Discharge: HOME | End: 2019-08-29
Admitting: INTERNAL MEDICINE

## 2019-08-29 ENCOUNTER — HOSPITAL ENCOUNTER (OUTPATIENT)
Dept: GENERAL RADIOLOGY | Facility: HOSPITAL | Age: 64
Discharge: HOME | End: 2019-08-29

## 2019-08-29 DIAGNOSIS — N20.0 CALCULUS OF KIDNEY: ICD-10-CM

## 2019-08-29 DIAGNOSIS — N20.0 CALCULUS OF KIDNEY: Primary | ICD-10-CM

## 2019-08-29 PROCEDURE — 74018 RADEX ABDOMEN 1 VIEW: CPT

## 2019-08-29 PROCEDURE — 77067 SCR MAMMO BI INCL CAD: CPT

## 2019-09-09 DIAGNOSIS — R92.8 ABNORMAL MAMMOGRAM: Primary | ICD-10-CM

## 2019-09-10 RX ORDER — ESCITALOPRAM OXALATE 10 MG/1
TABLET ORAL
Qty: 30 TABLET | Refills: 0 | Status: SHIPPED | OUTPATIENT
Start: 2019-09-10 | End: 2019-11-06 | Stop reason: SDUPTHER

## 2019-09-11 DIAGNOSIS — R92.8 ABNORMAL MAMMOGRAM: Primary | ICD-10-CM

## 2019-09-12 ENCOUNTER — HOSPITAL ENCOUNTER (OUTPATIENT)
Dept: ULTRASOUND IMAGING | Facility: HOSPITAL | Age: 64
End: 2019-09-12

## 2019-09-12 ENCOUNTER — HOSPITAL ENCOUNTER (OUTPATIENT)
Dept: MAMMOGRAPHY | Facility: HOSPITAL | Age: 64
Discharge: HOME | End: 2019-09-12
Admitting: INTERNAL MEDICINE

## 2019-09-12 DIAGNOSIS — R92.8 ABNORMAL MAMMOGRAM: ICD-10-CM

## 2019-09-12 PROCEDURE — 77065 DX MAMMO INCL CAD UNI: CPT

## 2019-10-18 ENCOUNTER — OFFICE VISIT (OUTPATIENT)
Dept: FAMILY MEDICINE CLINIC | Facility: CLINIC | Age: 64
End: 2019-10-18

## 2019-10-18 VITALS
BODY MASS INDEX: 32.21 KG/M2 | HEART RATE: 80 BPM | HEIGHT: 65 IN | WEIGHT: 193.3 LBS | SYSTOLIC BLOOD PRESSURE: 106 MMHG | DIASTOLIC BLOOD PRESSURE: 78 MMHG | OXYGEN SATURATION: 98 %

## 2019-10-18 DIAGNOSIS — F41.9 ANXIETY: ICD-10-CM

## 2019-10-18 DIAGNOSIS — Z23 NEED FOR VACCINATION: Primary | ICD-10-CM

## 2019-10-18 DIAGNOSIS — Z01.419 ENCOUNTER FOR CERVICAL PAP SMEAR WITH PELVIC EXAM: ICD-10-CM

## 2019-10-18 DIAGNOSIS — R41.3 MEMORY CHANGES: ICD-10-CM

## 2019-10-18 DIAGNOSIS — Z00.00 WELL ADULT EXAM: ICD-10-CM

## 2019-10-18 DIAGNOSIS — E55.9 VITAMIN D DEFICIENCY: ICD-10-CM

## 2019-10-18 DIAGNOSIS — Z78.0 MENOPAUSE: ICD-10-CM

## 2019-10-18 DIAGNOSIS — R73.9 HYPERGLYCEMIA: ICD-10-CM

## 2019-10-18 LAB
25(OH)D3+25(OH)D2 SERPL-MCNC: 35.3 NG/ML (ref 30–100)
ALBUMIN SERPL-MCNC: 4.7 G/DL (ref 3.5–5.2)
ALBUMIN/GLOB SERPL: 1.8 G/DL
ALP SERPL-CCNC: 66 U/L (ref 39–117)
ALT SERPL-CCNC: 15 U/L (ref 1–33)
AST SERPL-CCNC: 14 U/L (ref 1–32)
BASOPHILS # BLD AUTO: 0.02 10*3/MM3 (ref 0–0.2)
BASOPHILS NFR BLD AUTO: 0.3 % (ref 0–1.5)
BILIRUB SERPL-MCNC: 0.6 MG/DL (ref 0.2–1.2)
BUN SERPL-MCNC: 13 MG/DL (ref 8–23)
BUN/CREAT SERPL: 15.5 (ref 7–25)
CALCIUM SERPL-MCNC: 9.7 MG/DL (ref 8.6–10.5)
CHLORIDE SERPL-SCNC: 104 MMOL/L (ref 98–107)
CHOLEST SERPL-MCNC: 210 MG/DL (ref 0–200)
CO2 SERPL-SCNC: 26.7 MMOL/L (ref 22–29)
CREAT SERPL-MCNC: 0.84 MG/DL (ref 0.57–1)
EOSINOPHIL # BLD AUTO: 0.32 10*3/MM3 (ref 0–0.4)
EOSINOPHIL NFR BLD AUTO: 4.8 % (ref 0.3–6.2)
ERYTHROCYTE [DISTWIDTH] IN BLOOD BY AUTOMATED COUNT: 14.4 % (ref 12.3–15.4)
GLOBULIN SER CALC-MCNC: 2.6 GM/DL
GLUCOSE SERPL-MCNC: 103 MG/DL (ref 65–99)
HBA1C MFR BLD: 5.5 % (ref 4.8–5.6)
HCT VFR BLD AUTO: 45 % (ref 34–46.6)
HDLC SERPL-MCNC: 69 MG/DL (ref 40–60)
HGB BLD-MCNC: 14.8 G/DL (ref 12–15.9)
IMM GRANULOCYTES # BLD AUTO: 0.01 10*3/MM3 (ref 0–0.05)
IMM GRANULOCYTES NFR BLD AUTO: 0.2 % (ref 0–0.5)
LDLC SERPL CALC-MCNC: 131 MG/DL (ref 0–100)
LDLC/HDLC SERPL: 1.9 {RATIO}
LYMPHOCYTES # BLD AUTO: 1.82 10*3/MM3 (ref 0.7–3.1)
LYMPHOCYTES NFR BLD AUTO: 27.4 % (ref 19.6–45.3)
MCH RBC QN AUTO: 28.6 PG (ref 26.6–33)
MCHC RBC AUTO-ENTMCNC: 32.9 G/DL (ref 31.5–35.7)
MCV RBC AUTO: 87 FL (ref 79–97)
MONOCYTES # BLD AUTO: 0.49 10*3/MM3 (ref 0.1–0.9)
MONOCYTES NFR BLD AUTO: 7.4 % (ref 5–12)
NEUTROPHILS # BLD AUTO: 3.98 10*3/MM3 (ref 1.7–7)
NEUTROPHILS NFR BLD AUTO: 59.9 % (ref 42.7–76)
NRBC BLD AUTO-RTO: 0 /100 WBC (ref 0–0.2)
PLATELET # BLD AUTO: 246 10*3/MM3 (ref 140–450)
POTASSIUM SERPL-SCNC: 4.7 MMOL/L (ref 3.5–5.2)
PROT SERPL-MCNC: 7.3 G/DL (ref 6–8.5)
RBC # BLD AUTO: 5.17 10*6/MM3 (ref 3.77–5.28)
SODIUM SERPL-SCNC: 143 MMOL/L (ref 136–145)
T4 FREE SERPL-MCNC: 0.96 NG/DL (ref 0.93–1.7)
TRIGL SERPL-MCNC: 50 MG/DL (ref 0–150)
TSH SERPL DL<=0.005 MIU/L-ACNC: 1.16 UIU/ML (ref 0.27–4.2)
VLDLC SERPL CALC-MCNC: 10 MG/DL
WBC # BLD AUTO: 6.64 10*3/MM3 (ref 3.4–10.8)

## 2019-10-18 PROCEDURE — 99396 PREV VISIT EST AGE 40-64: CPT | Performed by: INTERNAL MEDICINE

## 2019-10-18 PROCEDURE — 90471 IMMUNIZATION ADMIN: CPT | Performed by: INTERNAL MEDICINE

## 2019-10-18 PROCEDURE — 90674 CCIIV4 VAC NO PRSV 0.5 ML IM: CPT | Performed by: INTERNAL MEDICINE
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
## 2019-10-22 DIAGNOSIS — E78.00 ELEVATED CHOLESTEROL: ICD-10-CM

## 2019-10-22 DIAGNOSIS — R73.9 HYPERGLYCEMIA: Primary | ICD-10-CM

## 2019-10-22 LAB
CONV .: NORMAL
CYTOLOGIST CVX/VAG CYTO: NORMAL
CYTOLOGY CVX/VAG DOC CYTO: NORMAL
CYTOLOGY CVX/VAG DOC THIN PREP: NORMAL
DX ICD CODE: NORMAL
HIV 1 & 2 AB SER-IMP: NORMAL
OTHER STN SPEC: NORMAL
STAT OF ADQ CVX/VAG CYTO-IMP: NORMAL

## 2019-10-25 ENCOUNTER — OFFICE VISIT (OUTPATIENT)
Dept: FAMILY MEDICINE CLINIC | Facility: CLINIC | Age: 64
End: 2019-10-25

## 2019-10-25 VITALS
BODY MASS INDEX: 32.15 KG/M2 | TEMPERATURE: 98.3 F | SYSTOLIC BLOOD PRESSURE: 116 MMHG | WEIGHT: 193 LBS | RESPIRATION RATE: 20 BRPM | DIASTOLIC BLOOD PRESSURE: 64 MMHG | HEIGHT: 65 IN | OXYGEN SATURATION: 97 % | HEART RATE: 86 BPM

## 2019-10-25 DIAGNOSIS — K76.0 FATTY LIVER DISEASE, NONALCOHOLIC: ICD-10-CM

## 2019-10-25 DIAGNOSIS — M25.512 ACUTE PAIN OF LEFT SHOULDER: Primary | ICD-10-CM

## 2019-10-25 PROCEDURE — 99213 OFFICE O/P EST LOW 20 MIN: CPT | Performed by: NURSE PRACTITIONER
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
## 2019-11-06 RX ORDER — ESCITALOPRAM OXALATE 10 MG/1
TABLET ORAL
Qty: 90 TABLET | Refills: 0 | Status: SHIPPED | OUTPATIENT
Start: 2019-11-06 | End: 2020-01-24 | Stop reason: ALTCHOICE

## 2019-11-13 ENCOUNTER — OFFICE VISIT (OUTPATIENT)
Dept: SPORTS MEDICINE | Facility: CLINIC | Age: 64
End: 2019-11-13

## 2019-11-13 VITALS
WEIGHT: 191.4 LBS | TEMPERATURE: 98.3 F | HEIGHT: 65 IN | OXYGEN SATURATION: 94 % | BODY MASS INDEX: 31.89 KG/M2 | DIASTOLIC BLOOD PRESSURE: 68 MMHG | SYSTOLIC BLOOD PRESSURE: 112 MMHG | HEART RATE: 77 BPM

## 2019-11-13 DIAGNOSIS — M75.42 IMPINGEMENT SYNDROME OF LEFT SHOULDER: Primary | ICD-10-CM

## 2019-11-13 PROCEDURE — 20610 DRAIN/INJ JOINT/BURSA W/O US: CPT | Performed by: FAMILY MEDICINE

## 2019-11-13 PROCEDURE — 99204 OFFICE O/P NEW MOD 45 MIN: CPT | Performed by: FAMILY MEDICINE

## 2019-11-13 RX ORDER — TRIAMCINOLONE ACETONIDE 40 MG/ML
40 INJECTION, SUSPENSION INTRA-ARTICULAR; INTRAMUSCULAR ONCE
Status: COMPLETED | OUTPATIENT
Start: 2019-11-13 | End: 2019-11-13

## 2019-11-13 RX ADMIN — TRIAMCINOLONE ACETONIDE 40 MG: 40 INJECTION, SUSPENSION INTRA-ARTICULAR; INTRAMUSCULAR at 16:33
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
## 2019-12-09 ENCOUNTER — OFFICE VISIT (OUTPATIENT)
Dept: GASTROENTEROLOGY | Facility: CLINIC | Age: 64
End: 2019-12-09

## 2019-12-09 VITALS
TEMPERATURE: 98.3 F | DIASTOLIC BLOOD PRESSURE: 80 MMHG | HEIGHT: 65 IN | SYSTOLIC BLOOD PRESSURE: 120 MMHG | WEIGHT: 195.8 LBS | BODY MASS INDEX: 32.62 KG/M2

## 2019-12-09 DIAGNOSIS — R93.89 ABNORMAL FINDING ON RADIOLOGY EXAM: Primary | ICD-10-CM

## 2019-12-09 PROCEDURE — 99203 OFFICE O/P NEW LOW 30 MIN: CPT | Performed by: INTERNAL MEDICINE

## 2019-12-09 NOTE — PROGRESS NOTES
Chief Complaint   Patient presents with   • Fatty Liver       Emerita Matthews is a 64 y.o. female who presents with fatty liver by imaging    64-year-old who is mildly overweight, has hyperlipidemia and was found to have fatty liver by CAT scan  Her liver function tests are normal  She does not have hypertension or diabetes  She has no right upper quadrant pain or other symptoms  She would like to discuss fatty liver disease      Past Medical History:   Diagnosis Date   • Allergic rhinitis    • Anemia    • Anxiety        Past Surgical History:   Procedure Laterality Date   • COLONOSCOPY  2016   • TUBAL ABDOMINAL LIGATION     • WISDOM TOOTH EXTRACTION           Current Outpatient Medications:   •  cholecalciferol (VITAMIN D3) 1000 UNITS tablet, Take 1,000 Units by mouth Daily., Disp: , Rfl:   •  escitalopram (LEXAPRO) 10 MG tablet, TAKE ONE TABLET BY MOUTH DAILY, Disp: 90 tablet, Rfl: 0    No Known Allergies    Social History     Socioeconomic History   • Marital status:      Spouse name: Not on file   • Number of children: Not on file   • Years of education: Not on file   • Highest education level: Not on file   Tobacco Use   • Smoking status: Never Smoker   • Smokeless tobacco: Never Used   Substance and Sexual Activity   • Alcohol use: Yes     Comment: social   • Drug use: No   • Sexual activity: Defer       Family History   Problem Relation Age of Onset   • COPD Mother    • Macular degeneration Mother    • Other Father         PROGRESSIVE SUPRANUCLEAR PALSIES   • Parkinsonism Brother    • Breast cancer Maternal Grandmother        Review of Systems   Gastrointestinal: Negative for abdominal distention, constipation and vomiting.   All other systems reviewed and are negative.      Vitals:    12/09/19 1521   BP: 120/80   Temp: 98.3 °F (36.8 °C)       Physical Exam   Constitutional: She is oriented to person, place, and time. She appears well-developed and well-nourished.   HENT:   Head: Normocephalic and  atraumatic.   Eyes: Pupils are equal, round, and reactive to light.   Cardiovascular: Normal rate, regular rhythm and normal heart sounds.   Pulmonary/Chest: Effort normal and breath sounds normal.   Abdominal: Soft. Bowel sounds are normal. She exhibits no shifting dullness, no distension, no pulsatile liver, no fluid wave, no abdominal bruit, no ascites, no pulsatile midline mass and no mass. There is no hepatosplenomegaly. There is no tenderness. There is no rigidity and no guarding. No hernia.   Musculoskeletal: Normal range of motion.   Neurological: She is alert and oriented to person, place, and time.   Skin: Skin is warm and dry.   Psychiatric: She has a normal mood and affect. Her behavior is normal. Thought content normal.   Nursing note and vitals reviewed.        Problem list    CAT scan with fatty liver disease  hyperlipidemia  BMI greater than 25      Assessment/Plan    We had a long discussion about fatty liver disease, Barnett  We discussed the metabolic syndrome and she should try to avoid diabetes, hypertension and hyperlipidemia with a low-fat diet and plenty of cardiovascular exercise  She should achieve her ideal body weight with a BMI of less than 25 within the next 3 years  Follow liver tests yearly  No further imaging required  I will check a Barnett fibro-sure just to make sure she does not have an elevated Barnett score or fibrosis but I highly doubt it, she will likely be steatosis only

## 2019-12-11 LAB
A2 MACROGLOB SERPL-MCNC: 169 MG/DL (ref 110–276)
ALT SERPL W P-5'-P-CCNC: 19 IU/L (ref 0–40)
APO A-I SERPL-MCNC: 197 MG/DL (ref 116–209)
AST SERPL W P-5'-P-CCNC: 21 IU/L (ref 0–40)
BILIRUB SERPL-MCNC: 0.3 MG/DL (ref 0–1.2)
CHOLEST SERPL-MCNC: 183 MG/DL (ref 100–199)
FIBROSIS SCORING:: NORMAL
FIBROSIS STAGE SERPL QL: NORMAL
GGT SERPL-CCNC: 10 IU/L (ref 0–60)
GLUCOSE SERPL-MCNC: 91 MG/DL (ref 65–99)
HAPTOGLOB SERPL-MCNC: 111 MG/DL (ref 34–200)
INTERPRETATIONS: (REFERENCE): NORMAL
LABORATORY COMMENT REPORT: NORMAL
LIVER FIBR SCORE SERPL CALC.FIBROSURE: 0.05 (ref 0–0.21)
NASH SCORING (REFERENCE): NORMAL
NECROINFLAMMATORY ACT GRADE SERPL QL: NORMAL
NECROINFLAMMATORY ACT SCORE SERPL: 0.25
SERVICE CMNT-IMP: NORMAL
STEATOSIS GRADE (REFERENCE): NORMAL
STEATOSIS GRADING (REFERENCE): NORMAL
STEATOSIS SCORE (REFERENCE): 0.29 (ref 0–0.3)
TRIGL SERPL-MCNC: 103 MG/DL (ref 0–149)

## 2019-12-12 ENCOUNTER — OFFICE VISIT (OUTPATIENT)
Dept: FAMILY MEDICINE CLINIC | Facility: CLINIC | Age: 64
End: 2019-12-12

## 2019-12-12 ENCOUNTER — APPOINTMENT (OUTPATIENT)
Dept: LAB | Facility: HOSPITAL | Age: 64
End: 2019-12-12

## 2019-12-12 ENCOUNTER — OFFICE VISIT (OUTPATIENT)
Dept: NEUROLOGY | Facility: CLINIC | Age: 64
End: 2019-12-12

## 2019-12-12 VITALS
HEART RATE: 72 BPM | RESPIRATION RATE: 16 BRPM | SYSTOLIC BLOOD PRESSURE: 122 MMHG | WEIGHT: 196 LBS | OXYGEN SATURATION: 98 % | TEMPERATURE: 98.5 F | DIASTOLIC BLOOD PRESSURE: 80 MMHG | HEIGHT: 65 IN | BODY MASS INDEX: 32.65 KG/M2

## 2019-12-12 VITALS
HEART RATE: 79 BPM | SYSTOLIC BLOOD PRESSURE: 130 MMHG | OXYGEN SATURATION: 98 % | BODY MASS INDEX: 32.65 KG/M2 | WEIGHT: 196 LBS | HEIGHT: 65 IN | DIASTOLIC BLOOD PRESSURE: 82 MMHG

## 2019-12-12 DIAGNOSIS — R41.3 MEMORY LOSS: Primary | ICD-10-CM

## 2019-12-12 DIAGNOSIS — J06.9 UPPER RESPIRATORY TRACT INFECTION, UNSPECIFIED TYPE: Primary | ICD-10-CM

## 2019-12-12 LAB — VIT B12 BLD-MCNC: 296 PG/ML (ref 211–946)

## 2019-12-12 PROCEDURE — 36415 COLL VENOUS BLD VENIPUNCTURE: CPT | Performed by: PSYCHIATRY & NEUROLOGY

## 2019-12-12 PROCEDURE — 99244 OFF/OP CNSLTJ NEW/EST MOD 40: CPT | Performed by: PSYCHIATRY & NEUROLOGY

## 2019-12-12 PROCEDURE — 99213 OFFICE O/P EST LOW 20 MIN: CPT | Performed by: NURSE PRACTITIONER

## 2019-12-12 PROCEDURE — 82607 VITAMIN B-12: CPT | Performed by: PSYCHIATRY & NEUROLOGY

## 2019-12-12 RX ORDER — AMOXICILLIN 500 MG/1
500 CAPSULE ORAL 3 TIMES DAILY
COMMUNITY
End: 2020-01-24

## 2019-12-12 RX ORDER — METHYLPREDNISOLONE 4 MG/1
TABLET ORAL
Qty: 21 EACH | Refills: 0 | Status: SHIPPED | OUTPATIENT
Start: 2019-12-12 | End: 2020-01-24

## 2019-12-12 RX ORDER — ACETAMINOPHEN 500 MG
500 TABLET ORAL EVERY 6 HOURS PRN
COMMUNITY
End: 2022-06-02

## 2019-12-12 RX ORDER — FLUTICASONE PROPIONATE 50 MCG
2 SPRAY, SUSPENSION (ML) NASAL AS NEEDED
COMMUNITY
End: 2020-08-07 | Stop reason: SDUPTHER

## 2019-12-12 RX ORDER — IBUPROFEN 200 MG
200 TABLET ORAL EVERY 6 HOURS PRN
COMMUNITY
End: 2020-06-03

## 2019-12-12 RX ORDER — DEXTROMETHORPHAN HYDROBROMIDE AND PROMETHAZINE HYDROCHLORIDE 15; 6.25 MG/5ML; MG/5ML
5 SYRUP ORAL 4 TIMES DAILY PRN
Qty: 118 ML | Refills: 0 | Status: SHIPPED | OUTPATIENT
Start: 2019-12-12 | End: 2019-12-16 | Stop reason: SDUPTHER
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
## 2019-12-13 NOTE — PROGRESS NOTES
Very mild steatosis, no evidence of Barnett or fibrosis which is excellent news  For her very mild steatosis she needs to achieve ideal body weight in 3 years, plenty of cardiovascular exercise low-fat diet  Recheck a CMP in 6 months  Follow-up with PCP

## 2019-12-16 RX ORDER — DEXTROMETHORPHAN HYDROBROMIDE AND PROMETHAZINE HYDROCHLORIDE 15; 6.25 MG/5ML; MG/5ML
5 SYRUP ORAL 4 TIMES DAILY PRN
Qty: 118 ML | Refills: 0 | Status: SHIPPED | OUTPATIENT
Start: 2019-12-16 | End: 2020-01-24

## 2020-01-17 ENCOUNTER — TELEPHONE (OUTPATIENT)
Dept: GASTROENTEROLOGY | Facility: CLINIC | Age: 65
End: 2020-01-17

## 2020-01-24 ENCOUNTER — OFFICE VISIT (OUTPATIENT)
Dept: FAMILY MEDICINE CLINIC | Facility: CLINIC | Age: 65
End: 2020-01-24

## 2020-01-24 VITALS
HEIGHT: 65 IN | WEIGHT: 202.6 LBS | HEART RATE: 80 BPM | TEMPERATURE: 98.2 F | BODY MASS INDEX: 33.76 KG/M2 | OXYGEN SATURATION: 95 % | RESPIRATION RATE: 16 BRPM | SYSTOLIC BLOOD PRESSURE: 122 MMHG | DIASTOLIC BLOOD PRESSURE: 82 MMHG

## 2020-01-24 DIAGNOSIS — M54.2 NECK PAIN: Primary | ICD-10-CM

## 2020-01-24 DIAGNOSIS — M25.512 LEFT SHOULDER PAIN, UNSPECIFIED CHRONICITY: ICD-10-CM

## 2020-01-24 DIAGNOSIS — M54.42 CHRONIC LEFT-SIDED LOW BACK PAIN WITH LEFT-SIDED SCIATICA: ICD-10-CM
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
## 2020-01-24 DIAGNOSIS — G89.29 CHRONIC LEFT-SIDED LOW BACK PAIN WITH LEFT-SIDED SCIATICA: ICD-10-CM

## 2020-01-24 PROCEDURE — 99214 OFFICE O/P EST MOD 30 MIN: CPT | Performed by: INTERNAL MEDICINE

## 2020-01-24 RX ORDER — DULOXETIN HYDROCHLORIDE 30 MG/1
30 CAPSULE, DELAYED RELEASE ORAL DAILY
Qty: 30 CAPSULE | Refills: 5 | Status: SHIPPED | OUTPATIENT
Start: 2020-01-24 | End: 2020-06-03 | Stop reason: SDUPTHER

## 2020-02-06 ENCOUNTER — HOSPITAL ENCOUNTER (OUTPATIENT)
Dept: MRI IMAGING | Facility: HOSPITAL | Age: 65
Discharge: HOME | End: 2020-02-06
Admitting: INTERNAL MEDICINE

## 2020-02-06 ENCOUNTER — HOSPITAL ENCOUNTER (OUTPATIENT)
Dept: MRI IMAGING | Facility: HOSPITAL | Age: 65
Discharge: HOME | End: 2020-02-06

## 2020-02-06 DIAGNOSIS — M54.2 NECK PAIN: ICD-10-CM
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
## 2020-02-06 DIAGNOSIS — M54.42 CHRONIC LEFT-SIDED LOW BACK PAIN WITH LEFT-SIDED SCIATICA: ICD-10-CM

## 2020-02-06 DIAGNOSIS — G89.29 CHRONIC LEFT-SIDED LOW BACK PAIN WITH LEFT-SIDED SCIATICA: ICD-10-CM

## 2020-02-06 PROCEDURE — 72148 MRI LUMBAR SPINE W/O DYE: CPT

## 2020-02-06 PROCEDURE — 72141 MRI NECK SPINE W/O DYE: CPT

## 2020-02-17 DIAGNOSIS — M54.2 NECK PAIN: Primary | ICD-10-CM

## 2020-02-17 DIAGNOSIS — R93.89 ABNORMAL MRI: ICD-10-CM

## 2020-02-20 ENCOUNTER — OFFICE VISIT (OUTPATIENT)
Dept: ORTHOPEDIC SURGERY | Facility: CLINIC | Age: 65
End: 2020-02-20

## 2020-02-20 VITALS — WEIGHT: 206.6 LBS | HEIGHT: 65 IN | BODY MASS INDEX: 34.42 KG/M2 | TEMPERATURE: 98.7 F

## 2020-02-20 DIAGNOSIS — S43.432A TEAR OF LEFT GLENOID LABRUM, INITIAL ENCOUNTER: Primary | ICD-10-CM

## 2020-02-20 PROCEDURE — 99203 OFFICE O/P NEW LOW 30 MIN: CPT | Performed by: ORTHOPAEDIC SURGERY
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
## 2020-03-19 ENCOUNTER — HOSPITAL ENCOUNTER (OUTPATIENT)
Dept: GENERAL RADIOLOGY | Facility: HOSPITAL | Age: 65
Discharge: HOME | End: 2020-03-19
Admitting: ORTHOPAEDIC SURGERY

## 2020-03-19 ENCOUNTER — HOSPITAL ENCOUNTER (OUTPATIENT)
Dept: MRI IMAGING | Facility: HOSPITAL | Age: 65
Discharge: HOME | End: 2020-03-19

## 2020-03-19 DIAGNOSIS — S43.432A TEAR OF LEFT GLENOID LABRUM, INITIAL ENCOUNTER: ICD-10-CM

## 2020-03-19 PROCEDURE — 0 GADOBENATE DIMEGLUMINE 529 MG/ML SOLUTION: Performed by: RADIOLOGY

## 2020-03-19 PROCEDURE — 25010000003 LIDOCAINE 1 % SOLUTION: Performed by: RADIOLOGY

## 2020-03-19 PROCEDURE — 73222 MRI JOINT UPR EXTREM W/DYE: CPT

## 2020-03-19 PROCEDURE — 77002 NEEDLE LOCALIZATION BY XRAY: CPT

## 2020-03-19 PROCEDURE — 25010000002 IOPAMIDOL 61 % SOLUTION: Performed by: RADIOLOGY

## 2020-03-19 PROCEDURE — A9577 INJ MULTIHANCE: HCPCS | Performed by: RADIOLOGY

## 2020-03-19 RX ORDER — LIDOCAINE HYDROCHLORIDE 10 MG/ML
10 INJECTION, SOLUTION INFILTRATION; PERINEURAL ONCE
Status: COMPLETED | OUTPATIENT
Start: 2020-03-19 | End: 2020-03-19

## 2020-03-19 RX ADMIN — GADOBENATE DIMEGLUMINE 0.05 ML: 529 INJECTION, SOLUTION INTRAVENOUS at 10:40

## 2020-03-19 RX ADMIN — LIDOCAINE HYDROCHLORIDE 3 ML: 10 INJECTION, SOLUTION INFILTRATION; PERINEURAL at 10:40

## 2020-03-19 RX ADMIN — IOPAMIDOL 3 ML: 612 INJECTION, SOLUTION INTRAVENOUS at 10:40

## 2020-03-23 ENCOUNTER — TELEPHONE (OUTPATIENT)
Dept: ORTHOPEDIC SURGERY | Facility: CLINIC | Age: 65
End: 2020-03-23

## 2020-03-24 ENCOUNTER — PREP FOR SURGERY (OUTPATIENT)
Dept: OTHER | Facility: HOSPITAL | Age: 65
End: 2020-03-24

## 2020-03-24 DIAGNOSIS — S43.439A LABRAL TEAR OF SHOULDER: Primary | ICD-10-CM

## 2020-03-24 RX ORDER — CEFAZOLIN SODIUM 2 G/100ML
2 INJECTION, SOLUTION INTRAVENOUS ONCE
Status: CANCELLED | OUTPATIENT
Start: 2020-03-24 | End: 2020-03-24

## 2020-03-24 NOTE — TELEPHONE ENCOUNTER
The neck step would be arthroscopy and at the time of surgery we would either be debridement of that labrum, possible repair of the labrum decompression.  What I will do is go on and put a case request and we will try to get it approved and we will just keep it on the list.  As soon as were able to schedule elective cases we will get her on the schedule and get her back in here for discussion

## 2020-04-22 ENCOUNTER — RESULTS ENCOUNTER (OUTPATIENT)
Dept: FAMILY MEDICINE CLINIC | Facility: CLINIC | Age: 65
End: 2020-04-22

## 2020-04-22 DIAGNOSIS — E78.00 ELEVATED CHOLESTEROL: ICD-10-CM

## 2020-04-22 DIAGNOSIS — R73.9 HYPERGLYCEMIA: ICD-10-CM

## 2020-04-24 ENCOUNTER — TELEPHONE (OUTPATIENT)
Dept: FAMILY MEDICINE CLINIC | Facility: CLINIC | Age: 65
End: 2020-04-24

## 2020-04-24 ENCOUNTER — OFFICE VISIT (OUTPATIENT)
Dept: FAMILY MEDICINE CLINIC | Facility: CLINIC | Age: 65
End: 2020-04-24

## 2020-04-24 DIAGNOSIS — F41.9 ANXIETY: Primary | ICD-10-CM

## 2020-04-24 PROCEDURE — 99442 PR PHYS/QHP TELEPHONE EVALUATION 11-20 MIN: CPT | Performed by: INTERNAL MEDICINE

## 2020-04-24 NOTE — TELEPHONE ENCOUNTER
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     PATIENT CALL BACK [phone].

## 2020-04-24 NOTE — TELEPHONE ENCOUNTER
Patient calling back in regards to letter. States that she is just needing this to document husbands health issues so that her work understands why she is needing off until May 4th. Please advise. She requests a call back if there are any questions but is hoping the letter could be sent today if possible. Please advise.

## 2020-04-24 NOTE — TELEPHONE ENCOUNTER
Spoke to patient and advised her to discuss this with Dr. Alex today at her scheduled telephone visit.  She expressed understanding

## 2020-04-24 NOTE — TELEPHONE ENCOUNTER
Since I am not her 's primary care doctor, I cannot write a letter regarding his health conditions.

## 2020-04-24 NOTE — PROGRESS NOTES
Subjective   Emerita Matthews is a 64 y.o. female who comes in today for No chief complaint on file.  .    History of Present Illness   Today we are doing a telemedicine visit due to Covid 19 restrictions.  You have chosen to receive care through a telephone visit. Do you consent to use a telephone visit for your medical care today? Yes  This visit has been rescheduled as a phone visit to comply with patient safety concerns in accordance with CDC recommendations. Total time of discussion was 15 minutes.    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  Her  is elderly and has diabetes.  She would like to stay out of work to prevent exposure.  She is wanting to either continue her unemployment or eventually use FMLA to stay out of work.  She loves her job and eventually wants to go back to work but is too scared with current conditions to go back until appropriate measures are taken.      The following portions of the patient's history were reviewed and updated as appropriate: allergies, current medications, past family history, past medical history, past social history, past surgical history and problem list.    Review of Systems   Psychiatric/Behavioral: The patient is nervous/anxious.        LMP  (LMP Unknown)     STEADI Fall Risk Assessment has not been completed.    PHQ-2/PHQ-9 Depression Screening 12/12/2019   Little interest or pleasure in doing things 0   Feeling down, depressed, or hopeless 0   Total Score 0       Objective   Physical Exam   Constitutional: She is oriented to person, place, and time.   Neurological: She is alert and oriented to person, place, and time.   Psychiatric: She has a normal mood and affect. Her behavior is normal. Judgment and thought content normal.         Current Outpatient Medications:   •  acetaminophen (TYLENOL) 500 MG tablet, Take 500 mg by mouth Every 6 (Six) Hours As Needed for Mild Pain ., Disp: , Rfl:   •   cholecalciferol (VITAMIN D3) 1000 UNITS tablet, Take 1,000 Units by mouth Daily., Disp: , Rfl:   •  DULoxetine (CYMBALTA) 30 MG capsule, Take 1 capsule by mouth Daily., Disp: 30 capsule, Rfl: 5  •  fluticasone (FLONASE) 50 MCG/ACT nasal spray, 2 sprays into the nostril(s) as directed by provider Daily., Disp: , Rfl:   •  ibuprofen (ADVIL,MOTRIN) 200 MG tablet, Take 200 mg by mouth Every 6 (Six) Hours As Needed for Mild Pain ., Disp: , Rfl:     Assessment/Plan   Diagnoses and all orders for this visit:    Anxiety    15 min telemedicine visit due to anxiety over returning to work in light of coronavirus 19 potential exposure.  She will contact her 's PCP to get an official letter to extend her unemployment to protect herself and her .  Letter has been written for her to provide documentation as well for her being over 60 years of age and in the high risk category.                   I have asked for the patient to return to clinic in 6week(s).

## 2020-05-04 ENCOUNTER — TELEPHONE (OUTPATIENT)
Dept: FAMILY MEDICINE CLINIC | Facility: CLINIC | Age: 65
End: 2020-05-04

## 2020-05-04 NOTE — TELEPHONE ENCOUNTER
PATIENT STILL NEEDS COPY OF LETTER WRITTEN ON 4/24 FOR HER UNEMPLOYMENT.     PLEASE MAIL LETTER.     PATIENT CALLBACK [phone]

## 2020-05-04 NOTE — TELEPHONE ENCOUNTER
Spoke to patient and informed her that the letter was placed in the mail. She expressed understanding and will let us know by Thursday if she still has not gotten it.  If not, we will run a copy out to her car.

## 2020-05-06 NOTE — TELEPHONE ENCOUNTER
PATIENT CALLED STATING HAS NOT RECEIVED THE LETTER, THAT HER EMPLOYER IS PUSHING HER TO GET THAT LETTER BY TOMORROW.  SHE WILL CALL WHEN I HER CAR OUTSIDE OF URGENT CARE AREA TO  LETTER

## 2020-05-12 ENCOUNTER — PREP FOR SURGERY (OUTPATIENT)
Dept: OTHER | Facility: HOSPITAL | Age: 65
End: 2020-05-12

## 2020-05-21 ENCOUNTER — OFFICE VISIT (OUTPATIENT)
Dept: ORTHOPEDIC SURGERY | Facility: CLINIC | Age: 65
End: 2020-05-21

## 2020-05-21 VITALS — TEMPERATURE: 97.3 F | WEIGHT: 220.6 LBS | BODY MASS INDEX: 36.75 KG/M2 | HEIGHT: 65 IN

## 2020-05-21 DIAGNOSIS — S43.432D TEAR OF LEFT GLENOID LABRUM, SUBSEQUENT ENCOUNTER: Primary | ICD-10-CM

## 2020-05-21 PROCEDURE — 99213 OFFICE O/P EST LOW 20 MIN: CPT | Performed by: ORTHOPAEDIC SURGERY
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
## 2020-06-03 ENCOUNTER — OFFICE VISIT (OUTPATIENT)
Dept: FAMILY MEDICINE CLINIC | Facility: CLINIC | Age: 65
End: 2020-06-03

## 2020-06-03 VITALS
RESPIRATION RATE: 16 BRPM | SYSTOLIC BLOOD PRESSURE: 133 MMHG | WEIGHT: 222.8 LBS | HEIGHT: 65 IN | TEMPERATURE: 97.8 F | OXYGEN SATURATION: 97 % | DIASTOLIC BLOOD PRESSURE: 90 MMHG | BODY MASS INDEX: 37.12 KG/M2 | HEART RATE: 91 BPM

## 2020-06-03 DIAGNOSIS — F41.8 MIXED ANXIETY DEPRESSIVE DISORDER: ICD-10-CM

## 2020-06-03 DIAGNOSIS — E55.9 VITAMIN D DEFICIENCY: Primary | ICD-10-CM

## 2020-06-03 DIAGNOSIS — E78.5 HYPERLIPIDEMIA, UNSPECIFIED HYPERLIPIDEMIA TYPE: ICD-10-CM

## 2020-06-03 DIAGNOSIS — F41.9 ANXIETY: ICD-10-CM

## 2020-06-03 DIAGNOSIS — R73.9 HYPERGLYCEMIA: ICD-10-CM

## 2020-06-03 DIAGNOSIS — J45.909 REACTIVE AIRWAY DISEASE WITHOUT COMPLICATION, UNSPECIFIED ASTHMA SEVERITY, UNSPECIFIED WHETHER PERSISTENT: ICD-10-CM

## 2020-06-03 LAB
ALBUMIN SERPL-MCNC: 4.6 G/DL (ref 3.5–5.2)
ALBUMIN/GLOB SERPL: 1.6 G/DL
ALP SERPL-CCNC: 56 U/L (ref 39–117)
ALT SERPL-CCNC: 22 U/L (ref 1–33)
AST SERPL-CCNC: 17 U/L (ref 1–32)
BILIRUB SERPL-MCNC: 0.5 MG/DL (ref 0.2–1.2)
BUN SERPL-MCNC: 13 MG/DL (ref 8–23)
BUN/CREAT SERPL: 15.7 (ref 7–25)
CALCIUM SERPL-MCNC: 9.7 MG/DL (ref 8.6–10.5)
CHLORIDE SERPL-SCNC: 100 MMOL/L (ref 98–107)
CHOLEST SERPL-MCNC: 211 MG/DL (ref 0–200)
CO2 SERPL-SCNC: 28.1 MMOL/L (ref 22–29)
CREAT SERPL-MCNC: 0.83 MG/DL (ref 0.57–1)
GLOBULIN SER CALC-MCNC: 2.9 GM/DL
GLUCOSE SERPL-MCNC: 114 MG/DL (ref 65–99)
HBA1C MFR BLD: 5.9 % (ref 4.8–5.6)
HDLC SERPL-MCNC: 85 MG/DL (ref 40–60)
LDLC SERPL CALC-MCNC: 110 MG/DL (ref 0–100)
LDLC/HDLC SERPL: 1.29 {RATIO}
POTASSIUM SERPL-SCNC: 4.5 MMOL/L (ref 3.5–5.2)
PROT SERPL-MCNC: 7.5 G/DL (ref 6–8.5)
SODIUM SERPL-SCNC: 136 MMOL/L (ref 136–145)
T4 FREE SERPL-MCNC: 0.86 NG/DL (ref 0.93–1.7)
TRIGL SERPL-MCNC: 81 MG/DL (ref 0–150)
TSH SERPL DL<=0.005 MIU/L-ACNC: 1.47 UIU/ML (ref 0.27–4.2)
VLDLC SERPL CALC-MCNC: 16.2 MG/DL

## 2020-06-03 PROCEDURE — 99214 OFFICE O/P EST MOD 30 MIN: CPT | Performed by: INTERNAL MEDICINE

## 2020-06-03 RX ORDER — DULOXETIN HYDROCHLORIDE 30 MG/1
30 CAPSULE, DELAYED RELEASE ORAL DAILY
Qty: 30 CAPSULE | Refills: 5 | Status: SHIPPED | OUTPATIENT
Start: 2020-06-03 | End: 2020-08-07 | Stop reason: SDUPTHER
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
## 2020-06-05 DIAGNOSIS — R79.89 LOW THYROID STIMULATING HORMONE (TSH) LEVEL: Primary | ICD-10-CM

## 2020-06-08 LAB
Lab: NORMAL
THYROGLOB AB SERPL-ACNC: <1 IU/ML (ref 0–0.9)
THYROPEROXIDASE AB SERPL-ACNC: <9 IU/ML (ref 0–34)
WRITTEN AUTHORIZATION: NORMAL

## 2020-06-10 ENCOUNTER — RESULTS ENCOUNTER (OUTPATIENT)
Dept: FAMILY MEDICINE CLINIC | Facility: CLINIC | Age: 65
End: 2020-06-10

## 2020-06-10 DIAGNOSIS — R79.89 LOW THYROID STIMULATING HORMONE (TSH) LEVEL: ICD-10-CM

## 2020-06-26 ENCOUNTER — OFFICE VISIT (OUTPATIENT)
Dept: GASTROENTEROLOGY | Facility: CLINIC | Age: 65
End: 2020-06-26

## 2020-06-26 VITALS — TEMPERATURE: 98 F | HEIGHT: 65 IN | WEIGHT: 200 LBS | BODY MASS INDEX: 33.32 KG/M2

## 2020-06-26 DIAGNOSIS — K76.0 FATTY LIVER DISEASE, NONALCOHOLIC: Primary | ICD-10-CM

## 2020-06-26 PROCEDURE — 99213 OFFICE O/P EST LOW 20 MIN: CPT | Performed by: NURSE PRACTITIONER

## 2020-06-26 NOTE — PROGRESS NOTES
Chief Complaint   Patient presents with   • Fatty liver disease     HPI    Emerita Matthews is a  64 y.o. female here for a follow up.  This patient has a history of fatty liver disease, no evidence of fibrosis.  This patient follows with Dr. Hull, new to me.  So was recommended to adhere to a low-fat diet and increase cardiovascular exercise to achieve an ideal body weight.     Reviewed CMP from earlier this month with normal LFTs.    Today patient reports she is done little in the way of diet and exercise.  Current BMI 33.3.  She does deny right upper quadrant pain other GI symptoms.   BM daily, no rectal bleeding.   Normal colonoscopy in 2016, due for follow-up in 2026.    Past Medical History:   Diagnosis Date   • Allergic rhinitis    • Anemia    • Anxiety    • Asthma    • Headache, tension-type    • Kidney stones 08/01/2019   • Memory loss        Past Surgical History:   Procedure Laterality Date   • COLONOSCOPY  2016   • DILATATION AND CURETTAGE  1988   • TUBAL ABDOMINAL LIGATION     • TUBAL ABDOMINAL LIGATION  1997   • WISDOM TOOTH EXTRACTION         Scheduled Meds:  Outpatient Encounter Medications as of 6/26/2020   Medication Sig Dispense Refill   • cholecalciferol (VITAMIN D3) 1000 UNITS tablet Take 1,000 Units by mouth Daily.     • DULoxetine (Cymbalta) 30 MG capsule Take 1 capsule by mouth Daily. 30 capsule 5   • fluticasone (FLONASE) 50 MCG/ACT nasal spray 2 sprays into the nostril(s) as directed by provider As Needed.     • acetaminophen (TYLENOL) 500 MG tablet Take 500 mg by mouth Every 6 (Six) Hours As Needed for Mild Pain .       No facility-administered encounter medications on file as of 6/26/2020.        Continuous Infusions:  No current facility-administered medications for this visit.     PRN Meds:.    No Known Allergies    Social History     Socioeconomic History   • Marital status:      Spouse name: Not on file   • Number of children: Not on file   • Years of education: Not on file   •  Highest education level: Not on file   Tobacco Use   • Smoking status: Never Smoker   • Smokeless tobacco: Never Used   Substance and Sexual Activity   • Alcohol use: Yes     Comment: social   • Drug use: Yes     Types: Marijuana     Comment: CBD gummies    • Sexual activity: Defer   Social History Narrative           Family History   Problem Relation Age of Onset   • COPD Mother    • Macular degeneration Mother    • Arthritis Mother    • Cancer Mother 86        bladder   • Other Father         PROGRESSIVE SUPRANUCLEAR PALSIES   • Parkinsonism Brother    • Breast cancer Maternal Grandmother    • Cancer Maternal Grandmother         breast   • Arthritis Paternal Grandmother        Review of Systems   Constitutional: Negative for activity change, appetite change, fatigue, fever and unexpected weight change.   HENT: Negative for trouble swallowing.    Respiratory: Negative for apnea, cough, choking, chest tightness, shortness of breath and wheezing.    Cardiovascular: Negative for chest pain, palpitations and leg swelling.   Gastrointestinal: Negative for abdominal distention, abdominal pain, anal bleeding, blood in stool, constipation, diarrhea, nausea, rectal pain and vomiting.       Vitals:    06/26/20 0943   Temp: 98 °F (36.7 °C)       Physical Exam   Constitutional: She is oriented to person, place, and time. She appears well-developed and well-nourished.   Eyes: Pupils are equal, round, and reactive to light.   Cardiovascular: Normal rate, regular rhythm and normal heart sounds.   Pulmonary/Chest: Effort normal and breath sounds normal. No respiratory distress. She has no wheezes.   Abdominal: Soft. Bowel sounds are normal. She exhibits no distension and no mass. There is no tenderness. There is no guarding. No hernia.   Musculoskeletal: Normal range of motion.   Neurological: She is alert and oriented to person, place, and time.   Skin: Skin is warm and dry. Capillary refill takes less than 2 seconds.    Psychiatric: She has a normal mood and affect. Her behavior is normal.       No radiology results for the last 7 days    Emerita was seen today for fatty liver disease.    Diagnoses and all orders for this visit:    Fatty liver disease, nonalcoholic    Assessment/plan    Pleasant 64-year-old female seen today in follow-up for fatty liver disease.  We had a very long discussion regarding treatment for fatty liver disease which includes low-fat diet, increase cardiovascular exercise, and achieving ideal body weight of a BMI less than 25 within the next 3 years.  Recent liver function tests were normal.  I did offer referral to nutritionist but patient declined.    Patient to work on recommendations and follow-up with us in 6 months.

## 2020-07-24 ENCOUNTER — TELEPHONE (OUTPATIENT)
Dept: FAMILY MEDICINE CLINIC | Facility: CLINIC | Age: 65
End: 2020-07-24
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
## 2020-08-07 ENCOUNTER — OFFICE VISIT (OUTPATIENT)
Dept: FAMILY MEDICINE CLINIC | Facility: CLINIC | Age: 65
End: 2020-08-07

## 2020-08-07 VITALS
HEART RATE: 79 BPM | SYSTOLIC BLOOD PRESSURE: 120 MMHG | HEIGHT: 65 IN | RESPIRATION RATE: 16 BRPM | OXYGEN SATURATION: 97 % | BODY MASS INDEX: 36.39 KG/M2 | DIASTOLIC BLOOD PRESSURE: 84 MMHG | WEIGHT: 218.4 LBS | TEMPERATURE: 97.9 F

## 2020-08-07 DIAGNOSIS — M54.2 NECK PAIN: Primary | ICD-10-CM

## 2020-08-07 DIAGNOSIS — F41.8 MIXED ANXIETY DEPRESSIVE DISORDER: ICD-10-CM

## 2020-08-07 DIAGNOSIS — J30.2 SEASONAL ALLERGIES: ICD-10-CM

## 2020-08-07 PROCEDURE — 99213 OFFICE O/P EST LOW 20 MIN: CPT | Performed by: INTERNAL MEDICINE

## 2020-08-07 RX ORDER — FLUTICASONE PROPIONATE 50 MCG
2 SPRAY, SUSPENSION (ML) NASAL AS NEEDED
Qty: 16 G | Refills: 3 | Status: SHIPPED | OUTPATIENT
Start: 2020-08-07 | End: 2020-08-19 | Stop reason: SDUPTHER

## 2020-08-07 RX ORDER — DULOXETIN HYDROCHLORIDE 30 MG/1
30 CAPSULE, DELAYED RELEASE ORAL DAILY
Qty: 90 CAPSULE | Refills: 1 | Status: SHIPPED | OUTPATIENT
Start: 2020-08-07 | End: 2020-08-19 | Stop reason: SDUPTHER

## 2020-08-07 RX ORDER — LORATADINE 10 MG/1
10 TABLET ORAL DAILY
COMMUNITY
End: 2020-08-07 | Stop reason: SDUPTHER

## 2020-08-07 RX ORDER — LORATADINE 10 MG/1
10 TABLET ORAL DAILY
Qty: 90 TABLET | Refills: 0 | Status: SHIPPED | OUTPATIENT
Start: 2020-08-07 | End: 2020-08-19 | Stop reason: SDUPTHER
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
## 2020-08-13 ENCOUNTER — TELEPHONE (OUTPATIENT)
Dept: FAMILY MEDICINE CLINIC | Facility: CLINIC | Age: 65
End: 2020-08-13

## 2020-08-13 NOTE — TELEPHONE ENCOUNTER
Pt has been called Flonase, Duloxetine, and another prescription was sent over on 08/07/20. Pt will call [employer] and find out exactly what they needed.

## 2020-08-13 NOTE — TELEPHONE ENCOUNTER
Patient called and [employer] has faxed over some information for patient and stated no one is responding. Would like for you to check fax and see if paperwork  received and complete and return. The fax number for [employer] [phone]    Any questions for patient call back [phone]

## 2020-08-19 ENCOUNTER — TELEPHONE (OUTPATIENT)
Dept: FAMILY MEDICINE CLINIC | Facility: CLINIC | Age: 65
End: 2020-08-19

## 2020-08-19 RX ORDER — FLUTICASONE PROPIONATE 50 MCG
SPRAY, SUSPENSION (ML) NASAL
Qty: 3 BOTTLE | Refills: 3 | Status: CANCELLED | OUTPATIENT
Start: 2020-08-19

## 2020-08-19 RX ORDER — LORATADINE 10 MG/1
10 TABLET ORAL DAILY
Qty: 90 TABLET | Refills: 0 | Status: CANCELLED | OUTPATIENT
Start: 2020-08-19

## 2020-08-19 RX ORDER — DULOXETIN HYDROCHLORIDE 30 MG/1
30 CAPSULE, DELAYED RELEASE ORAL DAILY
Qty: 90 CAPSULE | Refills: 1 | Status: SHIPPED | OUTPATIENT
Start: 2020-08-19 | End: 2021-05-14 | Stop reason: SDUPTHER

## 2020-08-19 RX ORDER — DULOXETIN HYDROCHLORIDE 30 MG/1
30 CAPSULE, DELAYED RELEASE ORAL DAILY
Qty: 90 CAPSULE | Refills: 1 | Status: CANCELLED | OUTPATIENT
Start: 2020-08-19

## 2020-08-19 RX ORDER — LORATADINE 10 MG/1
10 TABLET ORAL DAILY
Qty: 90 TABLET | Refills: 1 | Status: SHIPPED | OUTPATIENT
Start: 2020-08-19 | End: 2020-12-31

## 2020-08-19 RX ORDER — FLUTICASONE PROPIONATE 50 MCG
2 SPRAY, SUSPENSION (ML) NASAL AS NEEDED
Qty: 16 G | Refills: 3 | Status: SHIPPED | OUTPATIENT
Start: 2020-08-19 | End: 2020-10-22

## 2020-08-19 NOTE — TELEPHONE ENCOUNTER
PATIENT IS CALLING BECAUSE HER INSURANCE COMPANY NEEDS CLARIFICATION FOR THESE MEDICATIONS:    DULoxetine (Cymbalta) 30 MG capsule      fluticasone (FLONASE) 50 MCG/ACT nasal spray      loratadine (Claritin) 10 MG tablet    PLEASE CONTACT [facility] ON FILE.    NOTE: PATIENT HAS NOT RECEIVED ANY OF THESE MEDICATIONS.

## 2020-08-27 ENCOUNTER — TELEPHONE (OUTPATIENT)
Dept: FAMILY MEDICINE CLINIC | Facility: CLINIC | Age: 65
End: 2020-08-27

## 2020-08-27 NOTE — TELEPHONE ENCOUNTER
Called pharmacy   
Gio called in and stated  She wanted to verify the fluticasone (FLONASE) 50 MCG/ACT nasal spray the directions its missing the dosage  frequency       Please call gio at [phone]    
8

## 2020-09-25 NOTE — TELEPHONE ENCOUNTER
Last filled 10-13-16  
Needs appt for any more cough med bc it was last filled in Oct and this is a new illness. Or if the old illness has not gotten better, need to be eval at this point.  
Ok to RF cough syrup if I Rx it within the last 2 weeks  
Sent to dr bills  
[id number] Detailed

## 2020-10-06 DIAGNOSIS — K80.20 GALLSTONES: Primary | ICD-10-CM

## 2020-10-22 ENCOUNTER — OFFICE VISIT (OUTPATIENT)
Dept: SURGERY | Facility: CLINIC | Age: 65
End: 2020-10-22

## 2020-10-22 VITALS — BODY MASS INDEX: 35.99 KG/M2 | WEIGHT: 216 LBS | HEIGHT: 65 IN

## 2020-10-22 DIAGNOSIS — K80.20 CALCULUS OF GALLBLADDER WITHOUT CHOLECYSTITIS WITHOUT OBSTRUCTION: Primary | ICD-10-CM

## 2020-10-22 PROCEDURE — 99202 OFFICE O/P NEW SF 15 MIN: CPT | Performed by: SURGERY

## 2020-10-22 NOTE — PROGRESS NOTES
SUMMARY (A/P):    65-year-old lady with incidentally found gallstones on CT scan being performed for follow-up of nephrolithiasis.  She has absolutely no symptoms referable to the gallbladder.  I discussed risks of gallstones including symptoms to look for and potential complications including acute cholecystitis, choledocholithiasis, and pancreatitis.  As she is completely asymptomatic, it is not unreasonable to avoid surgical intervention until or unless she develops symptoms.  She will call if this happens.      CC:    Gallstones, referred for consultation by Dr. Alex    HPI:    65-year-old lady underwent CT scan on 9/25/2020 due to prior history of kidney stones.  She is having no abdominal pain.  No nausea or vomiting.  No appetite change.    RADIOLOGY/ENDOSCOPY:    • CT abdomen pelvis 9/25/2020 showed cholelithiasis.  On my review of the images, she has 3 calcified gallstones at most measuring 1 cm    PHYSICAL EXAM:   • Constitutional: Well-developed well-nourished, no acute distress  • Vital signs: Weight 216 pounds, height 65 inches, BMI 35.9  • Eyes: Conjunctiva normal, sclera nonicteric  • ENMT: Hearing grossly normal, oral mucosa moist  • Neck: Supple  • Respiratory: Normal inspiratory effort  • Cardiovascular: Regular rate, no murmur  • Gastrointestinal: Soft, nontender, no palpable mass, no hepatosplenomegaly  • Skin:  Warm, dry, no rash on visualized skin surfaces  • Musculoskeletal: Symmetric strength, normal gait  • Psychiatric: Alert and oriented ×3, normal affect     ALLERGIES:   • None    MEDICATIONS:   • Cymbalta  • Claritin    PMH:    • Nephrolithiasis  • Allergic rhinitis  • Asthma    PSH:    • Colonoscopy 2016    FAMILY HISTORY:    • Negative for colorectal cancer  • Oldest daughter with gallbladder disease    SOCIAL HISTORY:   • Denies tobacco use  • Occasional alcohol use    ROS:    Influenza Like Illness: no fever, no  cough, no  sore throat, no  body aches, no loss of sense of taste or  smell, no known exposure to person with Covid-19.  All other systems reviewed and negative other than presenting complaints.    JULIUS SHABAZZ M.D.

## 2020-11-24 ENCOUNTER — OFFICE VISIT (OUTPATIENT)
Dept: FAMILY MEDICINE CLINIC | Facility: CLINIC | Age: 65
End: 2020-11-24

## 2020-11-24 VITALS
HEIGHT: 65 IN | OXYGEN SATURATION: 96 % | HEART RATE: 85 BPM | WEIGHT: 216.3 LBS | DIASTOLIC BLOOD PRESSURE: 88 MMHG | SYSTOLIC BLOOD PRESSURE: 127 MMHG | TEMPERATURE: 97.7 F | BODY MASS INDEX: 36.04 KG/M2

## 2020-11-24 DIAGNOSIS — I82.611 SUPERFICIAL VENOUS THROMBOSIS OF ARM, RIGHT: Primary | ICD-10-CM

## 2020-11-24 PROCEDURE — 99213 OFFICE O/P EST LOW 20 MIN: CPT | Performed by: NURSE PRACTITIONER

## 2020-12-09 NOTE — PROGRESS NOTES
"Subjective   Emerita Matthews is a 65 y.o. female.     Very pleasant patient here today complains of a small tender bump on her arm since Saturday a.m. 2 AM  She woke up and noticed a tender bump on her arm  No history of injury  Does not bother her that much she just worried it could turn into a blood clot.  No chest pain no shortness of breath  She has no history of coagulation problems  No history of PE DVT  No fever chills or other complaints no upper arm pain or tenderness or swelling               /88   Pulse 85   Temp 97.7 °F (36.5 °C) (Temporal)   Ht 165.1 cm (65\")   Wt 98.1 kg (216 lb 4.8 oz)   LMP  (LMP Unknown)   SpO2 96%   BMI 35.99 kg/m²       The following portions of the patient's history were reviewed and updated as appropriate: allergies, current medications, past family history, past medical history, past social history, past surgical history and problem list.    Review of Systems   Constitutional: Negative for chills, fatigue, fever and unexpected weight loss.   HENT: Negative.  Negative for trouble swallowing.    Eyes: Negative.    Respiratory: Negative for cough and shortness of breath.    Cardiovascular: Negative for chest pain, palpitations and leg swelling.   Gastrointestinal: Negative for abdominal pain and blood in stool.   Genitourinary: Negative.  Negative for pelvic pain.   Musculoskeletal: Negative.         Nodule   Skin: Negative.    Neurological: Negative.  Negative for dizziness, speech difficulty, weakness and confusion.   Psychiatric/Behavioral: Negative.        Objective   Physical Exam  Vitals signs reviewed.   Constitutional:       Appearance: She is well-developed.   HENT:      Head: Normocephalic.      Nose: Nose normal.   Eyes:      General: No scleral icterus.     Conjunctiva/sclera: Conjunctivae normal.      Pupils: Pupils are equal, round, and reactive to light.   Neck:      Musculoskeletal: Neck supple.      Thyroid: No thyromegaly.      Vascular: No JVD. "   Cardiovascular:      Rate and Rhythm: Normal rate and regular rhythm.      Heart sounds: Normal heart sounds. No murmur. No friction rub. No gallop.    Pulmonary:      Effort: Pulmonary effort is normal. No respiratory distress.      Breath sounds: No stridor. No wheezing or rales.   Abdominal:      General: There is no distension.      Tenderness: There is no abdominal tenderness.      Comments: No hepatosplenomegaly, no ascites,   Musculoskeletal:         General: No tenderness.      Comments: Physical exam less than 1 cm  Moderately tender  Nodule with a slight bluish cast,  Located with his anterior irritation mid lateral forearm okay right mid lateral forearm  Mobile and located with a cluster of forearm veins  Consistent with a small tender, nodule     Lymphadenopathy:      Cervical: No cervical adenopathy.   Skin:     General: Skin is warm and dry.      Findings: No erythema or rash.   Neurological:      Mental Status: She is alert and oriented to person, place, and time.      Deep Tendon Reflexes: Reflexes are normal and symmetric.   Psychiatric:         Behavior: Behavior normal.         Thought Content: Thought content normal.         Judgment: Judgment normal.           Assessment/Plan   Diagnoses and all orders for this visit:    1. Superficial venous thrombosis of arm, right (Primary)      Patient has no evidence of DVT she has no infectious phlebitis or other abnormality  She likely bumped this area, causing a small thrombosis  If she has recurrent thrombosis she would need to see hematologist follow-up Dr. Wero Alex  I do not expect this to increase in size or more territory but she should watch it nonetheless and follow instructions below and should she have any increased pain redness or swelling seek prompt  treatment          Patient Instructions   Discharge instructions  Simply some warm compresses 3-4 times a day for the next several days  If increased pain redness swelling fever  chicorrine  Urgent recheck ER    Otherwise no worries here    Follow-up Dr. Josselyn Alex as needed    [email or web address] high filtration mask [email or web address] 2 or $3 each
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
## 2020-12-31 ENCOUNTER — OFFICE VISIT (OUTPATIENT)
Dept: NEUROSURGERY | Facility: CLINIC | Age: 65
End: 2020-12-31

## 2020-12-31 VITALS
SYSTOLIC BLOOD PRESSURE: 143 MMHG | HEART RATE: 88 BPM | WEIGHT: 219.2 LBS | HEIGHT: 66 IN | DIASTOLIC BLOOD PRESSURE: 90 MMHG | BODY MASS INDEX: 35.23 KG/M2

## 2020-12-31 DIAGNOSIS — M54.2 NECK PAIN: Primary | ICD-10-CM

## 2020-12-31 PROCEDURE — 99204 OFFICE O/P NEW MOD 45 MIN: CPT | Performed by: NEUROLOGICAL SURGERY

## 2021-01-22 ENCOUNTER — OFFICE VISIT (OUTPATIENT)
Dept: GASTROENTEROLOGY | Facility: CLINIC | Age: 66
End: 2021-01-22

## 2021-01-22 VITALS — BODY MASS INDEX: 34.97 KG/M2 | HEIGHT: 66 IN | WEIGHT: 217.6 LBS | TEMPERATURE: 97.2 F

## 2021-01-22 DIAGNOSIS — K76.0 FATTY LIVER DISEASE, NONALCOHOLIC: Primary | ICD-10-CM

## 2021-01-22 LAB
ALBUMIN SERPL-MCNC: 4.3 G/DL (ref 3.5–5.2)
ALBUMIN/GLOB SERPL: 1.6 G/DL
ALP SERPL-CCNC: 64 U/L (ref 39–117)
ALT SERPL-CCNC: 18 U/L (ref 1–33)
AST SERPL-CCNC: 19 U/L (ref 1–32)
BILIRUB SERPL-MCNC: 0.6 MG/DL (ref 0–1.2)
BUN SERPL-MCNC: 15 MG/DL (ref 8–23)
BUN/CREAT SERPL: 19.2 (ref 7–25)
CALCIUM SERPL-MCNC: 9.2 MG/DL (ref 8.6–10.5)
CHLORIDE SERPL-SCNC: 102 MMOL/L (ref 98–107)
CO2 SERPL-SCNC: 24.9 MMOL/L (ref 22–29)
CREAT SERPL-MCNC: 0.78 MG/DL (ref 0.57–1)
GLOBULIN SER CALC-MCNC: 2.7 GM/DL
GLUCOSE SERPL-MCNC: 96 MG/DL (ref 65–99)
POTASSIUM SERPL-SCNC: 4.2 MMOL/L (ref 3.5–5.2)
PROT SERPL-MCNC: 7 G/DL (ref 6–8.5)
SODIUM SERPL-SCNC: 138 MMOL/L (ref 136–145)

## 2021-01-22 PROCEDURE — 99213 OFFICE O/P EST LOW 20 MIN: CPT | Performed by: NURSE PRACTITIONER

## 2021-01-22 NOTE — PROGRESS NOTES
Chief Complaint   Patient presents with   • Fatty liver disease     HPI    Emerita Matthews is a  65 y.o. female here for a follow up visit for fatty liver disease.  This patient follows with Dr. Hull also known to me.  No evidence of fibrosis to date.  On visit today she is without GI complaints.  She does admit to increased weight gain with the current pandemic and has done little in the way of diet and exercise in relationship to fatty liver disease.  She is avoiding NSAIDs.    No diarrhea, constipation, rectal bleeding.    No nausea, vomiting, acid reflux/heartburn, or dysphagia.  Appetite is good.    Normal colonoscopy in 2016, due for follow-up in 2026.    Past Medical History:   Diagnosis Date   • Allergic rhinitis    • Anemia    • Anxiety    • Asthma    • Fatty liver    • Headache, tension-type    • Kidney stones 08/01/2019   • Memory loss        Past Surgical History:   Procedure Laterality Date   • COLONOSCOPY N/A 2016    NBIH otherwise normal-Dr. Hull   • DENTAL PROCEDURE      implants   • DILATATION AND CURETTAGE  1988   • KIDNEY STONE SURGERY     • TUBAL ABDOMINAL LIGATION  1997   • WISDOM TOOTH EXTRACTION         Scheduled Meds:  Outpatient Encounter Medications as of 1/22/2021   Medication Sig Dispense Refill   • acetaminophen (TYLENOL) 500 MG tablet Take 500 mg by mouth Every 6 (Six) Hours As Needed for Mild Pain .     • cholecalciferol (VITAMIN D3) 1000 UNITS tablet Take 1,000 Units by mouth Daily.     • DULoxetine (Cymbalta) 30 MG capsule Take 1 capsule by mouth Daily. 90 capsule 1     No facility-administered encounter medications on file as of 1/22/2021.        Continuous Infusions:No current facility-administered medications for this visit.       PRN Meds:.    No Known Allergies    Social History     Socioeconomic History   • Marital status:      Spouse name: Not on file   • Number of children: Not on file   • Years of education: Not on file   • Highest education level: Not on file    Tobacco Use   • Smoking status: Never Smoker   • Smokeless tobacco: Never Used   Substance and Sexual Activity   • Alcohol use: Yes     Comment: 3x per year   • Drug use: Defer     Comment: CBD gummies    • Sexual activity: Defer   Social History Narrative           Family History   Problem Relation Age of Onset   • COPD Mother    • Macular degeneration Mother    • Arthritis Mother    • Cancer Mother 86        bladder   • Other Father         PROGRESSIVE SUPRANUCLEAR PALSIES   • Parkinsonism Brother    • Breast cancer Maternal Grandmother    • Cancer Maternal Grandmother         breast   • Arthritis Paternal Grandmother    • Gallbladder disease Daughter        Review of Systems   Constitutional: Negative for activity change, appetite change, fatigue, fever and unexpected weight change.   HENT: Negative for trouble swallowing.    Respiratory: Negative for apnea, cough, choking, chest tightness, shortness of breath and wheezing.    Cardiovascular: Negative for chest pain, palpitations and leg swelling.   Gastrointestinal: Negative for abdominal distention, abdominal pain, anal bleeding, blood in stool, constipation, diarrhea, nausea, rectal pain and vomiting.       Vitals:    01/22/21 0921   Temp: 97.2 °F (36.2 °C)       Physical Exam  Constitutional:       Appearance: She is well-developed.   Cardiovascular:      Rate and Rhythm: Normal rate and regular rhythm.      Heart sounds: Normal heart sounds.   Pulmonary:      Effort: Pulmonary effort is normal. No respiratory distress.      Breath sounds: Normal breath sounds. No wheezing.   Abdominal:      General: Bowel sounds are normal. There is no distension.      Palpations: Abdomen is soft. There is no mass.      Tenderness: There is no abdominal tenderness. There is no guarding.      Hernia: No hernia is present.   Skin:     General: Skin is warm and dry.   Neurological:      Mental Status: She is alert and oriented to person, place, and time.   Psychiatric:          Behavior: Behavior normal.       No radiology results for the last 7 days    Diagnoses and all orders for this visit:    1. Fatty liver disease, nonalcoholic (Primary)  -     Comprehensive Metabolic Panel      Assessment/plan    Pleasant 65-year-old female seen today in follow-up for fatty liver disease.  I again counseled patient on the benefits of weight reduction, low-fat diet, increase cardiovascular exercise, and the achievement of ideal BMI.  Patient actually gained weight during the current pandemic.  Educational handout provided to the patient regarding fatty liver disease.  Arrange follow-up labs today to reassess liver function.  Patient follow back in the office in 6 months.

## 2021-01-26 ENCOUNTER — TELEPHONE (OUTPATIENT)
Dept: GASTROENTEROLOGY | Facility: CLINIC | Age: 66
End: 2021-01-26

## 2021-01-26 NOTE — TELEPHONE ENCOUNTER
----- Message from EDILBERTO Ross sent at 1/25/2021 12:41 PM EST -----  Let Emerita know her labs are normal.

## 2021-02-02 ENCOUNTER — IMMUNIZATION (OUTPATIENT)
Dept: VACCINE CLINIC | Facility: HOSPITAL | Age: 66
End: 2021-02-02

## 2021-02-02 PROCEDURE — 91300 HC SARSCOV02 VAC 30MCG/0.3ML IM: CPT | Performed by: INTERNAL MEDICINE

## 2021-02-02 PROCEDURE — 0001A: CPT | Performed by: INTERNAL MEDICINE

## 2021-02-03 DIAGNOSIS — E78.00 ELEVATED CHOLESTEROL: ICD-10-CM

## 2021-02-03 DIAGNOSIS — E55.9 VITAMIN D DEFICIENCY: Primary | ICD-10-CM

## 2021-02-03 DIAGNOSIS — R73.09 ELEVATED GLUCOSE: ICD-10-CM

## 2021-02-03 DIAGNOSIS — R94.6 THYROID FUNCTION TEST ABNORMAL: ICD-10-CM

## 2021-02-06 LAB
25(OH)D3+25(OH)D2 SERPL-MCNC: 37.4 NG/ML (ref 30–100)
ALBUMIN SERPL-MCNC: 4.3 G/DL (ref 3.5–5.2)
ALBUMIN/GLOB SERPL: 1.6 G/DL
ALP SERPL-CCNC: 62 U/L (ref 39–117)
ALT SERPL-CCNC: 18 U/L (ref 1–33)
AST SERPL-CCNC: 18 U/L (ref 1–32)
BASOPHILS # BLD AUTO: 0.02 10*3/MM3 (ref 0–0.2)
BASOPHILS NFR BLD AUTO: 0.4 % (ref 0–1.5)
BILIRUB SERPL-MCNC: 0.6 MG/DL (ref 0–1.2)
BUN SERPL-MCNC: 13 MG/DL (ref 8–23)
BUN/CREAT SERPL: 16.7 (ref 7–25)
CALCIUM SERPL-MCNC: 9.3 MG/DL (ref 8.6–10.5)
CHLORIDE SERPL-SCNC: 102 MMOL/L (ref 98–107)
CHOLEST SERPL-MCNC: 205 MG/DL (ref 0–200)
CO2 SERPL-SCNC: 27.5 MMOL/L (ref 22–29)
CREAT SERPL-MCNC: 0.78 MG/DL (ref 0.57–1)
EOSINOPHIL # BLD AUTO: 0.15 10*3/MM3 (ref 0–0.4)
EOSINOPHIL NFR BLD AUTO: 2.9 % (ref 0.3–6.2)
ERYTHROCYTE [DISTWIDTH] IN BLOOD BY AUTOMATED COUNT: 12.5 % (ref 12.3–15.4)
GLOBULIN SER CALC-MCNC: 2.7 GM/DL
GLUCOSE SERPL-MCNC: 100 MG/DL (ref 65–99)
HBA1C MFR BLD: 5.6 % (ref 4.8–5.6)
HCT VFR BLD AUTO: 44.8 % (ref 34–46.6)
HDLC SERPL-MCNC: 70 MG/DL (ref 40–60)
HGB BLD-MCNC: 14.8 G/DL (ref 12–15.9)
IMM GRANULOCYTES # BLD AUTO: 0.02 10*3/MM3 (ref 0–0.05)
IMM GRANULOCYTES NFR BLD AUTO: 0.4 % (ref 0–0.5)
LDLC SERPL CALC-MCNC: 124 MG/DL (ref 0–100)
LDLC/HDLC SERPL: 1.75 {RATIO}
LYMPHOCYTES # BLD AUTO: 2.09 10*3/MM3 (ref 0.7–3.1)
LYMPHOCYTES NFR BLD AUTO: 39.9 % (ref 19.6–45.3)
MCH RBC QN AUTO: 28.2 PG (ref 26.6–33)
MCHC RBC AUTO-ENTMCNC: 33 G/DL (ref 31.5–35.7)
MCV RBC AUTO: 85.5 FL (ref 79–97)
MONOCYTES # BLD AUTO: 0.44 10*3/MM3 (ref 0.1–0.9)
MONOCYTES NFR BLD AUTO: 8.4 % (ref 5–12)
NEUTROPHILS # BLD AUTO: 2.52 10*3/MM3 (ref 1.7–7)
NEUTROPHILS NFR BLD AUTO: 48 % (ref 42.7–76)
NRBC BLD AUTO-RTO: 0 /100 WBC (ref 0–0.2)
PLATELET # BLD AUTO: 253 10*3/MM3 (ref 140–450)
POTASSIUM SERPL-SCNC: 4.5 MMOL/L (ref 3.5–5.2)
PROT SERPL-MCNC: 7 G/DL (ref 6–8.5)
RBC # BLD AUTO: 5.24 10*6/MM3 (ref 3.77–5.28)
SODIUM SERPL-SCNC: 138 MMOL/L (ref 136–145)
T4 FREE SERPL-MCNC: 0.94 NG/DL (ref 0.93–1.7)
TRIGL SERPL-MCNC: 61 MG/DL (ref 0–150)
TSH SERPL DL<=0.005 MIU/L-ACNC: 1.06 UIU/ML (ref 0.27–4.2)
VLDLC SERPL CALC-MCNC: 11 MG/DL (ref 5–40)
WBC # BLD AUTO: 5.24 10*3/MM3 (ref 3.4–10.8)

## 2021-02-24 ENCOUNTER — IMMUNIZATION (OUTPATIENT)
Dept: VACCINE CLINIC | Facility: HOSPITAL | Age: 66
End: 2021-02-24

## 2021-02-24 PROCEDURE — 91300 HC SARSCOV02 VAC 30MCG/0.3ML IM: CPT | Performed by: INTERNAL MEDICINE

## 2021-02-24 PROCEDURE — 0002A: CPT | Performed by: INTERNAL MEDICINE

## 2021-05-04 ENCOUNTER — READMISSION MANAGEMENT (OUTPATIENT)
Dept: CALL CENTER | Facility: HOSPITAL | Age: 66
End: 2021-05-04

## 2021-05-04 ENCOUNTER — TELEPHONE (OUTPATIENT)
Dept: FAMILY MEDICINE CLINIC | Facility: CLINIC | Age: 66
End: 2021-05-04

## 2021-05-04 ENCOUNTER — HOSPITAL ENCOUNTER (OUTPATIENT)
Facility: HOSPITAL | Age: 66
Setting detail: OBSERVATION
Discharge: HOME | End: 2021-05-04
Attending: EMERGENCY MEDICINE | Admitting: INTERNAL MEDICINE

## 2021-05-04 VITALS
OXYGEN SATURATION: 97 % | HEIGHT: 64 IN | BODY MASS INDEX: 36.7 KG/M2 | RESPIRATION RATE: 16 BRPM | HEART RATE: 88 BPM | TEMPERATURE: 98.1 F | SYSTOLIC BLOOD PRESSURE: 170 MMHG | DIASTOLIC BLOOD PRESSURE: 105 MMHG | WEIGHT: 215 LBS

## 2021-05-04 DIAGNOSIS — K92.2 ACUTE LOWER GI BLEEDING: Primary | ICD-10-CM

## 2021-05-04 LAB
ABO GROUP BLD: NORMAL
ALBUMIN SERPL-MCNC: 4.4 G/DL (ref 3.5–5.2)
ALBUMIN/GLOB SERPL: 1.5 G/DL
ALP SERPL-CCNC: 69 U/L (ref 39–117)
ALT SERPL W P-5'-P-CCNC: 21 U/L (ref 1–33)
ANION GAP SERPL CALCULATED.3IONS-SCNC: 12.2 MMOL/L (ref 5–15)
AST SERPL-CCNC: 17 U/L (ref 1–32)
BASOPHILS # BLD AUTO: 0.03 10*3/MM3 (ref 0–0.2)
BASOPHILS NFR BLD AUTO: 0.4 % (ref 0–1.5)
BILIRUB SERPL-MCNC: 0.5 MG/DL (ref 0–1.2)
BLD GP AB SCN SERPL QL: NEGATIVE
BUN SERPL-MCNC: 14 MG/DL (ref 8–23)
BUN/CREAT SERPL: 18.4 (ref 7–25)
CALCIUM SPEC-SCNC: 9.2 MG/DL (ref 8.6–10.5)
CHLORIDE SERPL-SCNC: 105 MMOL/L (ref 98–107)
CO2 SERPL-SCNC: 24.8 MMOL/L (ref 22–29)
CREAT SERPL-MCNC: 0.76 MG/DL (ref 0.57–1)
DEPRECATED RDW RBC AUTO: 43.5 FL (ref 37–54)
EOSINOPHIL # BLD AUTO: 0.18 10*3/MM3 (ref 0–0.4)
EOSINOPHIL NFR BLD AUTO: 2.7 % (ref 0.3–6.2)
ERYTHROCYTE [DISTWIDTH] IN BLOOD BY AUTOMATED COUNT: 13.8 % (ref 12.3–15.4)
EXPIRATION DATE: ABNORMAL
FECAL OCCULT BLOOD SCREEN, POC: POSITIVE
GFR SERPL CREATININE-BSD FRML MDRD: 76 ML/MIN/1.73
GLOBULIN UR ELPH-MCNC: 2.9 GM/DL
GLUCOSE SERPL-MCNC: 99 MG/DL (ref 65–99)
HCT VFR BLD AUTO: 43.4 % (ref 34–46.6)
HCT VFR BLD AUTO: 46.3 % (ref 34–46.6)
HGB BLD-MCNC: 14.5 G/DL (ref 12–15.9)
HGB BLD-MCNC: 15.1 G/DL (ref 12–15.9)
IMM GRANULOCYTES # BLD AUTO: 0.01 10*3/MM3 (ref 0–0.05)
IMM GRANULOCYTES NFR BLD AUTO: 0.1 % (ref 0–0.5)
INR PPP: 0.96 (ref 0.9–1.1)
LYMPHOCYTES # BLD AUTO: 2.59 10*3/MM3 (ref 0.7–3.1)
LYMPHOCYTES NFR BLD AUTO: 38.5 % (ref 19.6–45.3)
Lab: 164
MCH RBC QN AUTO: 28 PG (ref 26.6–33)
MCHC RBC AUTO-ENTMCNC: 32.6 G/DL (ref 31.5–35.7)
MCV RBC AUTO: 85.9 FL (ref 79–97)
MONOCYTES # BLD AUTO: 0.56 10*3/MM3 (ref 0.1–0.9)
MONOCYTES NFR BLD AUTO: 8.3 % (ref 5–12)
NEGATIVE CONTROL: NEGATIVE
NEUTROPHILS NFR BLD AUTO: 3.36 10*3/MM3 (ref 1.7–7)
NEUTROPHILS NFR BLD AUTO: 50 % (ref 42.7–76)
NRBC BLD AUTO-RTO: 0 /100 WBC (ref 0–0.2)
PLATELET # BLD AUTO: 237 10*3/MM3 (ref 140–450)
PMV BLD AUTO: 9.8 FL (ref 6–12)
POSITIVE CONTROL: POSITIVE
POTASSIUM SERPL-SCNC: 4.1 MMOL/L (ref 3.5–5.2)
PROT SERPL-MCNC: 7.3 G/DL (ref 6–8.5)
PROTHROMBIN TIME: 12.5 SECONDS (ref 11.7–14.2)
RBC # BLD AUTO: 5.39 10*6/MM3 (ref 3.77–5.28)
RH BLD: NEGATIVE
SARS-COV-2 ORF1AB RESP QL NAA+PROBE: NOT DETECTED
SODIUM SERPL-SCNC: 142 MMOL/L (ref 136–145)
T&S EXPIRATION DATE: NORMAL
WBC # BLD AUTO: 6.73 10*3/MM3 (ref 3.4–10.8)

## 2021-05-04 PROCEDURE — 80053 COMPREHEN METABOLIC PANEL: CPT | Performed by: PHYSICIAN ASSISTANT

## 2021-05-04 PROCEDURE — 96361 HYDRATE IV INFUSION ADD-ON: CPT

## 2021-05-04 PROCEDURE — 85018 HEMOGLOBIN: CPT | Performed by: INTERNAL MEDICINE

## 2021-05-04 PROCEDURE — C9803 HOPD COVID-19 SPEC COLLECT: HCPCS

## 2021-05-04 PROCEDURE — 86900 BLOOD TYPING SEROLOGIC ABO: CPT | Performed by: PHYSICIAN ASSISTANT

## 2021-05-04 PROCEDURE — U0004 COV-19 TEST NON-CDC HGH THRU: HCPCS | Performed by: PHYSICIAN ASSISTANT

## 2021-05-04 PROCEDURE — 85025 COMPLETE CBC W/AUTO DIFF WBC: CPT | Performed by: PHYSICIAN ASSISTANT

## 2021-05-04 PROCEDURE — 99213 OFFICE O/P EST LOW 20 MIN: CPT | Performed by: INTERNAL MEDICINE

## 2021-05-04 PROCEDURE — 86850 RBC ANTIBODY SCREEN: CPT | Performed by: PHYSICIAN ASSISTANT

## 2021-05-04 PROCEDURE — G0378 HOSPITAL OBSERVATION PER HR: HCPCS

## 2021-05-04 PROCEDURE — 82270 OCCULT BLOOD FECES: CPT | Performed by: PHYSICIAN ASSISTANT

## 2021-05-04 PROCEDURE — 96374 THER/PROPH/DIAG INJ IV PUSH: CPT

## 2021-05-04 PROCEDURE — 86901 BLOOD TYPING SEROLOGIC RH(D): CPT | Performed by: PHYSICIAN ASSISTANT

## 2021-05-04 PROCEDURE — 99284 EMERGENCY DEPT VISIT MOD MDM: CPT

## 2021-05-04 PROCEDURE — 85014 HEMATOCRIT: CPT | Performed by: INTERNAL MEDICINE

## 2021-05-04 PROCEDURE — 85610 PROTHROMBIN TIME: CPT | Performed by: PHYSICIAN ASSISTANT

## 2021-05-04 PROCEDURE — U0005 INFEC AGEN DETEC AMPLI PROBE: HCPCS | Performed by: PHYSICIAN ASSISTANT

## 2021-05-04 RX ORDER — NITROGLYCERIN 0.4 MG/1
0.4 TABLET SUBLINGUAL
Status: DISCONTINUED | OUTPATIENT
Start: 2021-05-04 | End: 2021-05-04 | Stop reason: HOSPADM

## 2021-05-04 RX ORDER — ACETAMINOPHEN 650 MG/1
650 SUPPOSITORY RECTAL EVERY 4 HOURS PRN
Status: DISCONTINUED | OUTPATIENT
Start: 2021-05-04 | End: 2021-05-04 | Stop reason: HOSPADM

## 2021-05-04 RX ORDER — ONDANSETRON 2 MG/ML
4 INJECTION INTRAMUSCULAR; INTRAVENOUS EVERY 6 HOURS PRN
Status: DISCONTINUED | OUTPATIENT
Start: 2021-05-04 | End: 2021-05-04 | Stop reason: HOSPADM

## 2021-05-04 RX ORDER — SODIUM CHLORIDE 0.9 % (FLUSH) 0.9 %
10 SYRINGE (ML) INJECTION EVERY 12 HOURS SCHEDULED
Status: DISCONTINUED | OUTPATIENT
Start: 2021-05-04 | End: 2021-05-04 | Stop reason: HOSPADM

## 2021-05-04 RX ORDER — SODIUM CHLORIDE 9 MG/ML
100 INJECTION, SOLUTION INTRAVENOUS CONTINUOUS
Status: DISCONTINUED | OUTPATIENT
Start: 2021-05-04 | End: 2021-05-04 | Stop reason: HOSPADM

## 2021-05-04 RX ORDER — ACETAMINOPHEN 160 MG/5ML
650 SOLUTION ORAL EVERY 4 HOURS PRN
Status: DISCONTINUED | OUTPATIENT
Start: 2021-05-04 | End: 2021-05-04 | Stop reason: HOSPADM

## 2021-05-04 RX ORDER — DULOXETIN HYDROCHLORIDE 30 MG/1
30 CAPSULE, DELAYED RELEASE ORAL DAILY
Status: DISCONTINUED | OUTPATIENT
Start: 2021-05-04 | End: 2021-05-04 | Stop reason: HOSPADM

## 2021-05-04 RX ORDER — PANTOPRAZOLE SODIUM 40 MG/10ML
80 INJECTION, POWDER, LYOPHILIZED, FOR SOLUTION INTRAVENOUS ONCE
Status: COMPLETED | OUTPATIENT
Start: 2021-05-04 | End: 2021-05-04

## 2021-05-04 RX ORDER — ACETAMINOPHEN 325 MG/1
650 TABLET ORAL EVERY 4 HOURS PRN
Status: DISCONTINUED | OUTPATIENT
Start: 2021-05-04 | End: 2021-05-04 | Stop reason: HOSPADM

## 2021-05-04 RX ORDER — MELATONIN
1000 DAILY
Status: DISCONTINUED | OUTPATIENT
Start: 2021-05-04 | End: 2021-05-04 | Stop reason: HOSPADM

## 2021-05-04 RX ORDER — CETIRIZINE HYDROCHLORIDE 10 MG/1
10 TABLET ORAL DAILY
Status: DISCONTINUED | OUTPATIENT
Start: 2021-05-04 | End: 2021-05-04 | Stop reason: HOSPADM

## 2021-05-04 RX ORDER — SODIUM CHLORIDE 0.9 % (FLUSH) 0.9 %
10 SYRINGE (ML) INJECTION AS NEEDED
Status: DISCONTINUED | OUTPATIENT
Start: 2021-05-04 | End: 2021-05-04 | Stop reason: HOSPADM

## 2021-05-04 RX ORDER — LORATADINE 10 MG/1
10 CAPSULE, LIQUID FILLED ORAL
COMMUNITY
End: 2021-05-14 | Stop reason: SDUPTHER

## 2021-05-04 RX ADMIN — PANTOPRAZOLE SODIUM 80 MG: 40 INJECTION, POWDER, FOR SOLUTION INTRAVENOUS at 11:43

## 2021-05-04 RX ADMIN — SODIUM CHLORIDE 100 ML/HR: 9 INJECTION, SOLUTION INTRAVENOUS at 16:45

## 2021-05-04 RX ADMIN — SODIUM CHLORIDE 1000 ML: 9 INJECTION, SOLUTION INTRAVENOUS at 11:06

## 2021-05-04 NOTE — TELEPHONE ENCOUNTER
Pt states she is in the hospital for her  who is about to go back for heart surgery, pt has been advised to go to the ER and be evaluated. She is aware and gave verbal understanding and will do so once they take him back. Advised  of situation and she was agreeable.

## 2021-05-04 NOTE — TELEPHONE ENCOUNTER
PATIENT CALLED IN STATING SHE HAD A LOT OF BLOOD IN HER STOOL AND WANTED TO SPEAK WITH THE NURSE. I ATTEMPTED TO WARM TRANSFER BUT NO ANSWER.    PLEASE CALL BACK TO [facility]    BEST CALL BACK # [phone]

## 2021-05-05 ENCOUNTER — TRANSITIONAL CARE MANAGEMENT TELEPHONE ENCOUNTER (OUTPATIENT)
Dept: CALL CENTER | Facility: HOSPITAL | Age: 66
End: 2021-05-05

## 2021-05-05 NOTE — OUTREACH NOTE
Call Center TCM Note      Responses   [facility] patient discharged from?  [place]   Does the patient have one of the following disease processes/diagnoses(primary or secondary)?  Other   TCM attempt successful?  Yes   Discharge diagnosis  Acute lower GI bleeding   Meds reviewed with patient/caregiver?  Yes   Does the patient have all medications ordered at discharge?  N/A   Prescription comments  No med changes    Does the patient have a primary care provider?   Yes   Does the patient have an appointment with their PCP within 7 days of discharge?  Yes   Comments regarding PCP  Pt is sched with PCP Dr Alex on 05/07/2021.   Has the patient kept scheduled appointments due by today?  Yes   Has home health visited the patient within 72 hours of discharge?  N/A   Psychosocial issues?  No   Did the patient receive a copy of their discharge instructions?  Yes   Nursing interventions  Reviewed instructions with patient   What is the patient's perception of their health status since discharge?  Improving   Is the patient/caregiver able to teach back signs and symptoms related to disease process for when to call PCP?  Yes   Is the patient/caregiver able to teach back signs and symptoms related to disease process for when to call 911?  Yes   Is the patient/caregiver able to teach back the hierarchy of who to call/visit for symptoms/problems? PCP, Specialist, Home health nurse, Urgent Care, ED, 911  Yes   If the patient is a current smoker, are they able to teach back resources for cessation?  Not a smoker   TCM call completed?  Yes   Wrap up additional comments  Pt doing better, feels well, BM today with no bleeding. Currently at hospital with , He had sx and complications, husnband coded and vent. Doing better now. Getting pacemaker later today. Breathing on his own. Pt is sched with PCP Dr Alex on 05/07/2021.          Joselyn Lira MA    5/5/2021, 15:56 EDT

## 2021-05-05 NOTE — OUTREACH NOTE
Prep Survey      Responses   [facility] [place] patient discharged from?  [place]   Is LACE score < 7 ?  Yes   Emergency Room discharge w/ pulse ox?  No   Eligibility  [facility]   Date of Admission  05/04/21   Date of Discharge  05/04/21   Discharge Disposition  Home or Self Care   Discharge diagnosis  Acute lower GI bleeding   Does the patient have one of the following disease processes/diagnoses(primary or secondary)?  Other   Does the patient have Home health ordered?  No   Is there a DME ordered?  No   Prep survey completed?  Yes          Cecile Cardozo RN

## 2021-05-07 ENCOUNTER — TELEPHONE (OUTPATIENT)
Dept: GASTROENTEROLOGY | Facility: CLINIC | Age: 66
End: 2021-05-07

## 2021-05-07 NOTE — TELEPHONE ENCOUNTER
Patient stated she would like to get set up for a colonoscopy procedure .     She has a coming up appt with provider maksim - 07/22/21

## 2021-05-14 ENCOUNTER — TRANSCRIBE ORDERS (OUTPATIENT)
Dept: ADMINISTRATIVE | Facility: HOSPITAL | Age: 66
End: 2021-05-14

## 2021-05-14 ENCOUNTER — OFFICE VISIT (OUTPATIENT)
Dept: FAMILY MEDICINE CLINIC | Facility: CLINIC | Age: 66
End: 2021-05-14

## 2021-05-14 VITALS
HEIGHT: 64 IN | OXYGEN SATURATION: 98 % | SYSTOLIC BLOOD PRESSURE: 112 MMHG | WEIGHT: 220 LBS | BODY MASS INDEX: 37.56 KG/M2 | TEMPERATURE: 96.8 F | DIASTOLIC BLOOD PRESSURE: 82 MMHG | HEART RATE: 87 BPM | RESPIRATION RATE: 16 BRPM

## 2021-05-14 DIAGNOSIS — Z12.31 SCREENING MAMMOGRAM FOR HIGH-RISK PATIENT: Primary | ICD-10-CM

## 2021-05-14 DIAGNOSIS — J30.2 SEASONAL ALLERGIES: ICD-10-CM

## 2021-05-14 DIAGNOSIS — Z12.31 SCREENING MAMMOGRAM, ENCOUNTER FOR: ICD-10-CM

## 2021-05-14 DIAGNOSIS — F41.9 ANXIETY: ICD-10-CM

## 2021-05-14 DIAGNOSIS — E55.9 VITAMIN D DEFICIENCY: Primary | ICD-10-CM

## 2021-05-14 PROCEDURE — 99213 OFFICE O/P EST LOW 20 MIN: CPT | Performed by: INTERNAL MEDICINE

## 2021-05-14 PROCEDURE — G0402 INITIAL PREVENTIVE EXAM: HCPCS | Performed by: INTERNAL MEDICINE

## 2021-05-14 RX ORDER — DULOXETIN HYDROCHLORIDE 30 MG/1
30 CAPSULE, DELAYED RELEASE ORAL DAILY
Qty: 90 CAPSULE | Refills: 1 | Status: SHIPPED | OUTPATIENT
Start: 2021-05-14 | End: 2022-02-14

## 2021-05-14 RX ORDER — LORATADINE 10 MG/1
10 CAPSULE, LIQUID FILLED ORAL DAILY
Qty: 90 EACH | Refills: 3 | Status: SHIPPED | OUTPATIENT
Start: 2021-05-14 | End: 2022-09-19

## 2021-05-14 NOTE — PROGRESS NOTES
"Chief Complaint  Annual Exam (awv )    Subjective          Emerita Matthews presents to [facility]  History of Present Illness  Here for AWV- initial for Medicare.  Has an upcoming appt with GI Dr. Hull's office in July 2021 to f/u on fecal urgency but she recently had LGI bleed last week and was in [facility] ED.  Didn't stay over night.  Bleeding stopped within the day and no further bleeding since that time.  Does have fecal urgency and some incontinence occasionally.  CN 10/2016 and will have that repeated in July after she sees GI.   Objective   Vital Signs:   /82   Pulse 87   Temp 96.8 °F (36 °C) (Temporal)   Resp 16   Ht 162.6 cm (64\")   Wt 99.8 kg (220 lb)   SpO2 98%   BMI 37.76 kg/m²     Physical Exam  Vitals and nursing note reviewed.   Constitutional:       Appearance: Normal appearance. She is well-developed.   HENT:      Head: Normocephalic and atraumatic.      Right Ear: External ear normal.      Left Ear: External ear normal.   Eyes:      Extraocular Movements: Extraocular movements intact.      Conjunctiva/sclera: Conjunctivae normal.   Neck:      Vascular: No carotid bruit.   Cardiovascular:      Rate and Rhythm: Normal rate and regular rhythm.      Heart sounds: Normal heart sounds.      Comments: No bruits  Pulmonary:      Effort: Pulmonary effort is normal. No respiratory distress.      Breath sounds: Normal breath sounds. No wheezing or rales.   Abdominal:      General: Bowel sounds are normal. There is no distension.      Palpations: Abdomen is soft. There is no mass.      Tenderness: There is no abdominal tenderness.   Musculoskeletal:      Cervical back: Neck supple.   Lymphadenopathy:      Cervical: No cervical adenopathy.   Skin:     General: Skin is warm.   Neurological:      General: No focal deficit present.      Mental Status: She is alert and oriented to person, place, and time.   Psychiatric:         Mood and Affect: Mood normal.         Behavior: " Behavior normal.         Thought Content: Thought content normal.         Judgment: Judgment normal.        Result Review :                 Assessment and Plan    Diagnoses and all orders for this visit:    1. Vitamin D deficiency (Primary)    2. Seasonal allergies    3. Anxiety    4. Screening mammogram, encounter for  -     Mammo Screening Bilateral With CAD; Future        Follow Up   No follow-ups on file.  Patient was given instructions and counseling regarding her condition or for health maintenance advice. Please see specific information pulled into the AVS if appropriate.     Consider shingrix at the pharmacy  Fasting labs reviewed  MMG ordered  CN this summer with Dr. Parker  Declines advanced directive information  Eye exam is yearly  Screens negative for depression, diabetes, dementia  Did discuss weight loss efforts and better relationship with food.  I did give her the name of a counselor that does cognitive behavioral therapy.  I have ordered duloxetine for her as a refill as well as Claritin as a refill due to seasonal allergies.

## 2021-05-14 NOTE — PROGRESS NOTES
The ABCs of the Annual Wellness Visit  Initial Medicare Wellness Visit    Chief Complaint   Patient presents with   • Annual Exam     awv        Subjective   History of Present Illness:  Emerita Matthews is a 65 y.o. female who presents for an Initial Medicare Wellness Visit.    HEALTH RISK ASSESSMENT    Recent Hospitalizations:  No hospitalization(s) within the last year.    Current Medical Providers:  Patient Care Team:  Josselyn Alex MD as PCP - General (Internal Medicine)    Smoking Status:  Social History     Tobacco Use   Smoking Status Never Smoker   Smokeless Tobacco Never Used       Alcohol Consumption:  Social History     Substance and Sexual Activity   Alcohol Use Yes    Comment: 3x per year       Depression Screen:   PHQ-2/PHQ-9 Depression Screening 5/14/2021   Little interest or pleasure in doing things 0   Feeling down, depressed, or hopeless 0   Total Score 0       Fall Risk Screen:  CLAIRADI Fall Risk Assessment was completed, and patient is at LOW risk for falls.Assessment completed on:5/14/2021    Health Habits and Functional and Cognitive Screening:  Functional & Cognitive Status 5/14/2021   Do you have difficulty preparing food and eating? No   Do you have difficulty bathing yourself, getting dressed or grooming yourself? No   Do you have difficulty using the toilet? No   Do you have difficulty moving around from place to place? No   Do you have trouble with steps or getting out of a bed or a chair? No   Current Diet Well Balanced Diet   Dental Exam Up to date   Eye Exam Up to date   Exercise (times per week) 2 times per week   Current Exercises Include House Cleaning   Do you need help using the phone?  No   Are you deaf or do you have serious difficulty hearing?  No   Do you need help with transportation? No   Do you need help shopping? No   Do you need help preparing meals?  No   Do you need help with housework?  No   Do you need help with laundry? No   Do you need help taking your medications? No    Do you need help managing money? No   Do you ever drive or ride in a car without wearing a seat belt? No   Have you felt unusual stress, anger or loneliness in the last month? No   Who do you live with? Spouse   If you need help, do you have trouble finding someone available to you? No   Have you been bothered in the last four weeks by sexual problems? No   Do you have difficulty concentrating, remembering or making decisions? No         Does the patient have evidence of cognitive impairment? No    Asprin use counseling:Does not need ASA (and currently is not on it)    Age-appropriate Screening Schedule:  Refer to the list below for future screening recommendations based on patient's age, sex and/or medical conditions. Orders for these recommended tests are listed in the plan section. The patient has been provided with a written plan.    Health Maintenance   Topic Date Due   • ZOSTER VACCINE (2 of 3) 11/10/2016   • DXA SCAN  12/01/2019   • INFLUENZA VACCINE  08/01/2021   • MAMMOGRAM  09/12/2021   • LIPID PANEL  02/05/2022   • PAP SMEAR  10/18/2022   • TDAP/TD VACCINES (3 - Td) 12/26/2026          The following portions of the patient's history were reviewed and updated as appropriate: allergies, current medications, past family history, past medical history, past social history, past surgical history and problem list.    Outpatient Medications Prior to Visit   Medication Sig Dispense Refill   • cholecalciferol (VITAMIN D3) 1000 UNITS tablet Take 1,000 Units by mouth Daily.     • Probiotic Product (CULTURELLE PROBIOTICS PO) Take 1 Units by mouth.     • DULoxetine (Cymbalta) 30 MG capsule Take 1 capsule by mouth Daily. 90 capsule 1   • Loratadine (Claritin) 10 MG capsule Take 10 mg by mouth.     • acetaminophen (TYLENOL) 500 MG tablet Take 500 mg by mouth Every 6 (Six) Hours As Needed for Mild Pain .       No facility-administered medications prior to visit.       Patient Active Problem List   Diagnosis   • Anxiety  "  • Mixed anxiety depressive disorder   • Neck pain   • Loss of sense of smell   • Vitamin D deficiency   • Plantar fasciitis of right foot   • Reactive airway disease   • Pyelonephritis, acute   • Nephrocalcinosis   • History of bacteremia   • Seasonal allergies   • Acute lower GI bleeding   • Asthma       Advanced Care Planning:  ACP discussion was held with the patient during this visit. Patient does not have an advance directive, declines further assistance.    Review of Systems    Compared to one year ago, the patient feels her physical health is worse.  Compared to one year ago, the patient feels her mental health is better.    Reviewed chart for potential of high risk medication in the elderly: yes  Reviewed chart for potential of harmful drug interactions in the elderly:yes    Objective         Vitals:    05/14/21 0758   BP: 112/82   Pulse: 87   Resp: 16   Temp: 96.8 °F (36 °C)   TempSrc: Temporal   SpO2: 98%   Weight: 99.8 kg (220 lb)   Height: 162.6 cm (64\")   PainSc: 0-No pain       Body mass index is 37.76 kg/m².  Discussed the patient's BMI with her. The BMI is above average; BMI management plan is completed.    Physical Exam          Assessment/Plan   Medicare Risks and Personalized Health Plan  CMS Preventative Services Quick Reference  Advance Directive Discussion  Breast Cancer/Mammogram Screening  Colon Cancer Screening  Dementia/Memory   Depression/Dysphoria  Diabetic Lab Screening   Fall Risk  Glaucoma Risk  Immunizations Discussed/Encouraged (specific immunizations; Shingrix )    The above risks/problems have been discussed with the patient.  Pertinent information has been shared with the patient in the After Visit Summary.  Follow up plans and orders are seen below in the Assessment/Plan Section.    Diagnoses and all orders for this visit:    1. Vitamin D deficiency (Primary)    2. Seasonal allergies    3. Anxiety    4. Screening mammogram, encounter for  -     Mammo Screening Bilateral With " CAD; Future    Other orders  -     DULoxetine (Cymbalta) 30 MG capsule; Take 1 capsule by mouth Daily.  Dispense: 90 capsule; Refill: 1  -     Loratadine (Claritin) 10 MG capsule; Take 1 capsule by mouth Daily.  Dispense: 90 each; Refill: 3    Hemoglobin & Hematocrit, Blood (05/04/2021 20:16)  COVID PRE-OP / PRE-PROCEDURE SCREENING ORDER (NO ISOLATION) - Swab, Nasopharynx (05/04/2021 11:48)  Protime-INR (05/04/2021 11:05)  Type & Screen (05/04/2021 11:05)  Comprehensive Metabolic Panel (05/04/2021 11:05)  CBC & Differential (05/04/2021 11:05)    Follow Up:  No follow-ups on file.     An After Visit Summary and PPPS were given to the patient.     Consider shingrix at the pharmacy  Fasting labs reviewed  MMG ordered  CN this summer with Dr. Parker  Declines advanced directive information  Eye exam is yearly  Screens negative for depression, diabetes, dementia  Did discuss weight loss efforts and better relationship with food.  I did give her the name of a counselor that does cognitive behavioral therapy.  I have ordered duloxetine for her as a refill as well as Claritin as a refill due to seasonal allergies.

## 2021-05-24 ENCOUNTER — TELEPHONE (OUTPATIENT)
Dept: FAMILY MEDICINE CLINIC | Facility: CLINIC | Age: 66
End: 2021-05-24
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
## 2021-05-25 NOTE — TELEPHONE ENCOUNTER
I don't feel like this should come from me, I feel like the patient would feel offended with these questions coming from me. I don't feel comfortable doing this.

## 2021-05-25 NOTE — TELEPHONE ENCOUNTER
Spoke with pt after speaking with . Pt is aware she will need to speak with her urologist about her Kidney stones as he is the one who is treating and has been treating her for her issues. Pt is aware and gave verbal understanding.

## 2021-07-22 ENCOUNTER — OFFICE VISIT (OUTPATIENT)
Dept: GASTROENTEROLOGY | Facility: CLINIC | Age: 66
End: 2021-07-22

## 2021-07-22 ENCOUNTER — TELEPHONE (OUTPATIENT)
Dept: GASTROENTEROLOGY | Facility: CLINIC | Age: 66
End: 2021-07-22

## 2021-07-22 VITALS — WEIGHT: 223 LBS | BODY MASS INDEX: 38.07 KG/M2 | HEIGHT: 64 IN

## 2021-07-22 DIAGNOSIS — K62.5 RECTAL BLEEDING: Primary | ICD-10-CM

## 2021-07-22 PROCEDURE — 99214 OFFICE O/P EST MOD 30 MIN: CPT | Performed by: NURSE PRACTITIONER

## 2021-07-22 NOTE — PROGRESS NOTES
Chief Complaint   Patient presents with   • Hospital Follow Up Visit     Rectal bleeding     HPI    Emerita Matthews is a  65 y.o. female here for a follow up visit for rectal bleeding status post hospital discharge.    This patient follows with Dr. Hull and myself.    She is seen by our services during her hospital admission in May.  She was hemodynamically stable and did not have any more rectal bleeding while in the hospital.  Bleeding thought to be secondary to hemorrhoids.  She is been recommended to have an outpatient colonoscopy.  She had a normal colonoscopy in 2016.    On visit today she reports no rectal bleeding since hospital discharge.  Overall she feels quite well.  Occasionally has episodes of diarrhea but this is improved with initiation of Najera colon health probiotics.  No abdominal pain or rectal pain reported on visit today.  Her weight has been stable.    No upper GI complaints.    Past Medical History:   Diagnosis Date   • Allergic rhinitis    • Anemia    • Anxiety    • Asthma    • Fatty liver    • Headache, tension-type    • Kidney stones 08/01/2019   • Memory loss        Past Surgical History:   Procedure Laterality Date   • COLONOSCOPY N/A 2016    NBIH otherwise normal-Dr. Hull   • DENTAL PROCEDURE      implants   • DILATATION AND CURETTAGE  1988   • KIDNEY STONE SURGERY     • TUBAL ABDOMINAL LIGATION  1997   • WISDOM TOOTH EXTRACTION         Scheduled Meds:  Outpatient Encounter Medications as of 7/22/2021   Medication Sig Dispense Refill   • acetaminophen (TYLENOL) 500 MG tablet Take 500 mg by mouth Every 6 (Six) Hours As Needed for Mild Pain .     • cholecalciferol (VITAMIN D3) 1000 UNITS tablet Take 1,000 Units by mouth Daily.     • DULoxetine (Cymbalta) 30 MG capsule Take 1 capsule by mouth Daily. 90 capsule 1   • Loratadine (Claritin) 10 MG capsule Take 1 capsule by mouth Daily. 90 each 3   • Probiotic Product (CULTURELLE PROBIOTICS PO) Take 1 Units by mouth.       No  facility-administered encounter medications on file as of 7/22/2021.       Continuous Infusions:No current facility-administered medications for this visit.      PRN Meds:.    No Known Allergies    Social History     Socioeconomic History   • Marital status:      Spouse name: Not on file   • Number of children: Not on file   • Years of education: Not on file   • Highest education level: Not on file   Tobacco Use   • Smoking status: Never Smoker   • Smokeless tobacco: Never Used   Substance and Sexual Activity   • Alcohol use: Yes     Comment: 3x per year   • Drug use: Yes     Types: Marijuana     Comment: CBD gummies    • Sexual activity: Defer       Family History   Problem Relation Age of Onset   • COPD Mother    • Macular degeneration Mother    • Arthritis Mother    • Cancer Mother 86        bladder   • Other Father         PROGRESSIVE SUPRANUCLEAR PALSIES   • Parkinsonism Brother    • Breast cancer Maternal Grandmother    • Cancer Maternal Grandmother         breast   • Arthritis Paternal Grandmother    • Gallbladder disease Daughter        Review of Systems   Constitutional: Negative for activity change, appetite change, fatigue, fever and unexpected weight change.   HENT: Negative for trouble swallowing.    Respiratory: Negative for apnea, cough, choking, chest tightness, shortness of breath and wheezing.    Cardiovascular: Negative for chest pain, palpitations and leg swelling.   Gastrointestinal: Positive for anal bleeding. Negative for abdominal distention, abdominal pain, blood in stool, constipation, diarrhea, nausea, rectal pain and vomiting.       There were no vitals filed for this visit.    Physical Exam  Constitutional:       Appearance: She is well-developed.   Abdominal:      General: Bowel sounds are normal. There is no distension.      Palpations: Abdomen is soft. There is no mass.      Tenderness: There is no abdominal tenderness. There is no guarding.      Hernia: No hernia is present.    Musculoskeletal:         General: Normal range of motion.   Skin:     General: Skin is warm and dry.      Capillary Refill: Capillary refill takes less than 2 seconds.   Neurological:      Mental Status: She is alert and oriented to person, place, and time.   Psychiatric:         Behavior: Behavior normal.     Assessment    Rectal bleeding    Plan    Pleasant 65-year-old female seen today in follow-up for rectal bleeding.    Based on the patient’s description, the bleeding seems likely anorectal in origin, such as with hemorrhoid, tear, or fissure, but differential diagnosis would include underlying colonic inflammation, polyp, or mass. The patient will begin medications as delineated below and assess for symptomatic improvement in the meanwhile. Further recommendations will be made pending results of colonoscopy with Dr. Hull.    Risk/benefits of procedure reviewed with the patient.  All questions were answered.    Follow-up and further recommendations pending endoscopic evaluation.

## 2021-08-12 ENCOUNTER — HOSPITAL ENCOUNTER (OUTPATIENT)
Dept: MAMMOGRAPHY | Facility: HOSPITAL | Age: 66
Discharge: HOME | End: 2021-08-12
Admitting: INTERNAL MEDICINE

## 2021-08-12 DIAGNOSIS — Z12.31 SCREENING MAMMOGRAM FOR HIGH-RISK PATIENT: ICD-10-CM

## 2021-08-12 PROCEDURE — 77067 SCR MAMMO BI INCL CAD: CPT

## 2021-08-12 PROCEDURE — 77063 BREAST TOMOSYNTHESIS BI: CPT

## 2021-08-25 ENCOUNTER — OUTSIDE FACILITY SERVICE (OUTPATIENT)
Dept: GASTROENTEROLOGY | Facility: CLINIC | Age: 66
End: 2021-08-25

## 2021-08-25 PROCEDURE — 45385 COLONOSCOPY W/LESION REMOVAL: CPT | Performed by: INTERNAL MEDICINE

## 2021-08-25 PROCEDURE — 45380 COLONOSCOPY AND BIOPSY: CPT | Performed by: INTERNAL MEDICINE

## 2021-09-07 ENCOUNTER — TELEPHONE (OUTPATIENT)
Dept: GASTROENTEROLOGY | Facility: CLINIC | Age: 66
End: 2021-09-07

## 2021-09-07 NOTE — TELEPHONE ENCOUNTER
----- Message from Gab Hull MD sent at 9/3/2021 11:06 AM EDT -----  Tubular adenoma and sessile serrated adenomaColonoscopy recall 3yrs

## 2021-10-26 ENCOUNTER — OFFICE VISIT (OUTPATIENT)
Dept: FAMILY MEDICINE CLINIC | Facility: CLINIC | Age: 66
End: 2021-10-26

## 2021-10-26 VITALS
RESPIRATION RATE: 16 BRPM | HEIGHT: 65 IN | OXYGEN SATURATION: 98 % | DIASTOLIC BLOOD PRESSURE: 92 MMHG | WEIGHT: 220 LBS | SYSTOLIC BLOOD PRESSURE: 142 MMHG | HEART RATE: 91 BPM | BODY MASS INDEX: 36.65 KG/M2 | TEMPERATURE: 95.1 F

## 2021-10-26 DIAGNOSIS — J06.9 ACUTE URI: Primary | ICD-10-CM

## 2021-10-26 PROCEDURE — 99213 OFFICE O/P EST LOW 20 MIN: CPT | Performed by: NURSE PRACTITIONER

## 2021-10-26 RX ORDER — DEXTROMETHORPHAN HYDROBROMIDE AND PROMETHAZINE HYDROCHLORIDE 15; 6.25 MG/5ML; MG/5ML
5 SYRUP ORAL 4 TIMES DAILY PRN
Qty: 120 ML | Refills: 0 | Status: SHIPPED | OUTPATIENT
Start: 2021-10-26 | End: 2021-12-29 | Stop reason: SDUPTHER

## 2021-10-26 NOTE — PROGRESS NOTES
"Chief Complaint  Cough (since sunday), Nasal Congestion, and Headache    Subjective          Emerita Matthews presents to [facility]  Started on Sunday with mid back discomfort and cough. Has a headache related to cough. Cough is dry, nonproductive, denies shortness of breath, occasional wheezes. Took at home test for covid and it was negative. Does report nasal congestion, mild runny nose, clear currently. Denies sinus pain. Denies sore throat or ear pain. Eating well. Drinking fluids. No known ill contacts. covid vaccine completed and booster.   No fever or chills.    Cough  This is a new problem. The problem has been unchanged. The cough is non-productive. Associated symptoms include headaches (related to cough), nasal congestion, postnasal drip, rhinorrhea and wheezing (occasional). Pertinent negatives include no chest pain, chills, ear congestion, ear pain, fever, heartburn, hemoptysis, myalgias, rash, sore throat, shortness of breath, sweats or weight loss. Nothing aggravates the symptoms.   Headache   Associated symptoms include coughing and rhinorrhea. Pertinent negatives include no ear pain, fever, sore throat or weight loss.       Objective   Vital Signs:   /92   Pulse 91   Temp 95.1 °F (35.1 °C) (Infrared)   Resp 16   Ht 165.1 cm (65\")   Wt 99.8 kg (220 lb)   SpO2 98%   BMI 36.61 kg/m²     Physical Exam  Vitals reviewed.   Constitutional:       Appearance: Normal appearance.   HENT:      Right Ear: Tympanic membrane and ear canal normal.      Left Ear: Tympanic membrane and ear canal normal.      Nose: Congestion present.      Mouth/Throat:      Mouth: Mucous membranes are moist.      Pharynx: Oropharynx is clear. No oropharyngeal exudate or posterior oropharyngeal erythema.   Eyes:      Conjunctiva/sclera: Conjunctivae normal.      Pupils: Pupils are equal, round, and reactive to light.   Cardiovascular:      Rate and Rhythm: Normal rate and regular rhythm.      " Heart sounds: No murmur heard.  No friction rub. No gallop.    Pulmonary:      Effort: Pulmonary effort is normal. No respiratory distress.      Breath sounds: Normal breath sounds. No wheezing, rhonchi or rales.   Skin:     General: Skin is warm and dry.   Neurological:      Mental Status: She is alert and oriented to person, place, and time.   Psychiatric:         Mood and Affect: Mood normal.        Result Review :                 Assessment and Plan    There are no diagnoses linked to this encounter.    Follow Up   No follow-ups on file.  Patient was given instructions and counseling regarding her condition or for health maintenance advice. Please see specific information pulled into the AVS if appropriate.     covid negative with at home test, vaccines utd, including booster, declines further covid test  Recommend increasing fluids, symptomatic treatment, cough medication given to help with cough at night, ok to take in daytime, but advised may cause drowsiness and no driving while taking medication.  For worsening symptoms, she will message the office and consider steroid/antibiotic as needed and presentation of symptoms

## 2021-11-17 ENCOUNTER — TELEPHONE (OUTPATIENT)
Dept: FAMILY MEDICINE CLINIC | Facility: CLINIC | Age: 66
End: 2021-11-17

## 2021-11-17 RX ORDER — ALBUTEROL SULFATE 90 UG/1
2 AEROSOL, METERED RESPIRATORY (INHALATION) EVERY 6 HOURS PRN
Qty: 18 G | Refills: 0 | Status: SHIPPED | OUTPATIENT
Start: 2021-11-17 | End: 2022-06-02

## 2021-11-17 NOTE — TELEPHONE ENCOUNTER
Patient was prescribed this medication years ago by another provider. She was seen on 10/21/21 with Laverne for the same symptoms and he has not resolved. She is scheduled for cataract surgery and needs cough resolved before they will do it.

## 2021-11-17 NOTE — TELEPHONE ENCOUNTER
Caller: Emerita Matthews    Relationship: Self    Best call back number: [phone]    What medication are you requesting: ADVAIR COMBIANT     What are your current symptoms: COUGH/ CONGESTION    How long have you been experiencing symptoms: 2 WEEKS    Have you had these symptoms before:    [x] Yes  [] No    Have you been treated for these symptoms before:   [x] Yes  [] No    If a prescription is needed, what is your preferred pharmacy and phone number: [facility] [address] - [phone]  - [phone] FX     Additional notes:

## 2021-11-18 NOTE — TELEPHONE ENCOUNTER
Pt has been made aware of response and gave verbal understanding. She states she does not want albuterol and only combivent. She states if she needs albuterol she will just use her husbands.

## 2021-12-30 ENCOUNTER — TELEPHONE (OUTPATIENT)
Dept: FAMILY MEDICINE CLINIC | Facility: CLINIC | Age: 66
End: 2021-12-30

## 2021-12-30 DIAGNOSIS — R05.9 COUGH: Primary | ICD-10-CM

## 2021-12-30 DIAGNOSIS — U07.1 COVID-19 VIRUS INFECTION: Primary | ICD-10-CM

## 2021-12-30 RX ORDER — EPINEPHRINE 1 MG/ML
0.3 INJECTION, SOLUTION, CONCENTRATE INTRAVENOUS AS NEEDED
Status: CANCELLED | OUTPATIENT
Start: 2021-12-30

## 2021-12-30 RX ORDER — METHYLPREDNISOLONE SODIUM SUCCINATE 125 MG/2ML
125 INJECTION, POWDER, LYOPHILIZED, FOR SOLUTION INTRAMUSCULAR; INTRAVENOUS AS NEEDED
Status: CANCELLED | OUTPATIENT
Start: 2021-12-30

## 2021-12-30 RX ORDER — DIPHENHYDRAMINE HYDROCHLORIDE 50 MG/ML
50 INJECTION INTRAMUSCULAR; INTRAVENOUS ONCE AS NEEDED
Status: CANCELLED | OUTPATIENT
Start: 2021-12-30

## 2021-12-30 RX ORDER — DIPHENHYDRAMINE HCL 25 MG
50 TABLET ORAL ONCE AS NEEDED
Status: CANCELLED | OUTPATIENT
Start: 2021-12-30

## 2021-12-30 RX ORDER — GUAIFENESIN AND CODEINE PHOSPHATE 100; 10 MG/5ML; MG/5ML
5 SOLUTION ORAL 3 TIMES DAILY PRN
Qty: 180 ML | Refills: 0 | OUTPATIENT
Start: 2021-12-30 | End: 2022-03-06

## 2021-12-30 RX ORDER — SODIUM CHLORIDE 9 MG/ML
30 INJECTION, SOLUTION INTRAVENOUS ONCE
Status: CANCELLED | OUTPATIENT
Start: 2021-12-30

## 2021-12-30 NOTE — TELEPHONE ENCOUNTER
DARNELL    Caller: Emerita Matthews    Relationship: Self    Best call back number: [phone]    What medication are you requesting: CODEINE COUGH SYRUP    What are your current symptoms: COUCH, CANT SLEEP AT NIGHT    How long have you been experiencing symptoms: 5 DAYS    Have you had these symptoms before:    [] Yes  [] No    Have you been treated for these symptoms before:   [] Yes  [] No    If a prescription is needed, what is your preferred pharmacy and phone number:  [address] - [phone]  - [phone]        Additional notes: PATIENT IS COVID POSITIVE AND NEEDS SOME COUGH SYRUP

## 2021-12-30 NOTE — TELEPHONE ENCOUNTER
Caller: Emerita Matthews    Relationship to patient: Self    Best call back number: [phone]    Patient is needing: PATIENT CALLING TO ASK ABOUT THE INFUSION VACCINE. PLEASE ADVISE

## 2021-12-31 NOTE — TELEPHONE ENCOUNTER
I have ordered MAB infusion and we can set her up on Monday for infusion at .  Also I sent in robitussin with codeine for cough syrup

## 2022-01-03 ENCOUNTER — TELEPHONE (OUTPATIENT)
Dept: INTERNAL MEDICINE | Facility: CLINIC | Age: 67
End: 2022-01-03

## 2022-01-03 ENCOUNTER — OFFICE VISIT (OUTPATIENT)
Dept: FAMILY MEDICINE CLINIC | Facility: CLINIC | Age: 67
End: 2022-01-03

## 2022-01-03 ENCOUNTER — HOSPITAL ENCOUNTER (OUTPATIENT)
Dept: INFUSION THERAPY | Facility: HOSPITAL | Age: 67
Discharge: HOME | End: 2022-01-03

## 2022-01-03 VITALS
SYSTOLIC BLOOD PRESSURE: 140 MMHG | WEIGHT: 222 LBS | DIASTOLIC BLOOD PRESSURE: 100 MMHG | HEART RATE: 97 BPM | OXYGEN SATURATION: 96 % | TEMPERATURE: 97.5 F | HEIGHT: 64 IN | RESPIRATION RATE: 14 BRPM | BODY MASS INDEX: 37.9 KG/M2

## 2022-01-03 DIAGNOSIS — I10 ESSENTIAL HYPERTENSION: ICD-10-CM

## 2022-01-03 DIAGNOSIS — U07.1 COVID-19 VIRUS INFECTION: Primary | ICD-10-CM

## 2022-01-03 PROCEDURE — 99214 OFFICE O/P EST MOD 30 MIN: CPT | Performed by: NURSE PRACTITIONER

## 2022-01-03 RX ORDER — HYDROCHLOROTHIAZIDE 12.5 MG/1
12.5 TABLET ORAL DAILY
Qty: 30 TABLET | Refills: 1 | Status: SHIPPED | OUTPATIENT
Start: 2022-01-03 | End: 2022-03-03

## 2022-01-03 RX ORDER — DIPHENHYDRAMINE HYDROCHLORIDE 50 MG/ML
50 INJECTION INTRAMUSCULAR; INTRAVENOUS ONCE AS NEEDED
OUTPATIENT
Start: 2022-01-03

## 2022-01-03 RX ORDER — SODIUM CHLORIDE 9 MG/ML
30 INJECTION, SOLUTION INTRAVENOUS ONCE
OUTPATIENT
Start: 2022-01-03

## 2022-01-03 RX ORDER — PHENOL 1.4 %
20 AEROSOL, SPRAY (ML) MUCOUS MEMBRANE NIGHTLY
COMMUNITY
End: 2022-06-02

## 2022-01-03 RX ORDER — METHYLPREDNISOLONE SODIUM SUCCINATE 125 MG/2ML
125 INJECTION, POWDER, LYOPHILIZED, FOR SOLUTION INTRAMUSCULAR; INTRAVENOUS AS NEEDED
OUTPATIENT
Start: 2022-01-03

## 2022-01-03 RX ORDER — DIPHENHYDRAMINE HCL 25 MG
50 CAPSULE ORAL ONCE AS NEEDED
OUTPATIENT
Start: 2022-01-03

## 2022-01-03 NOTE — PROGRESS NOTES
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  She thinks likely the change in her schedule is attributed to more of the weight gain,  Has had a cough congestion for about 10 days or longer, she has no fever no chills no chest pain or shortness of breath she is improving no worsening symptoms especially none in the last 72 hours without any fever fever equivalent or any dyspnea she is scheduled for outpatient antibody infusion but she thinks she may cancel this today    Hypertension        Objective   Vital Signs:   /100   Pulse 97   Temp 97.5 °F (36.4 °C) (Infrared)   Resp 14   Ht 162.6 cm (64\")   Wt 101 kg (222 lb)   SpO2 96%   BMI 38.11 kg/m²     Physical Exam  Vitals reviewed.   Constitutional:       Appearance: She is well-developed.   HENT:      Head: Normocephalic.      Nose: Nose normal.   Eyes:      General: No scleral icterus.     Conjunctiva/sclera: Conjunctivae normal.      Pupils: Pupils are equal, round, and reactive to light.   Neck:      Thyroid: No thyromegaly.      Vascular: No JVD.   Cardiovascular:      Rate and Rhythm: Normal rate and regular rhythm.      Heart sounds: Normal heart sounds. No murmur heard.  No friction rub. No gallop.    Pulmonary:      Effort: Pulmonary effort is normal. No respiratory distress.      Breath sounds: Normal breath sounds. No stridor. No wheezing or rales.   Abdominal:      General: Bowel sounds are normal. There is no distension.      Palpations: Abdomen is soft.      Tenderness: There is no abdominal tenderness.      Comments: No hepatosplenomegaly, no " ascites,   Musculoskeletal:         General: No tenderness.      Cervical back: Neck supple.   Lymphadenopathy:      Cervical: No cervical adenopathy.   Skin:     General: Skin is warm and dry.      Findings: No erythema or rash.   Neurological:      Mental Status: She is alert and oriented to person, place, and time.      Deep Tendon Reflexes: Reflexes are normal and symmetric.   Psychiatric:         Behavior: Behavior normal.         Thought Content: Thought content normal.         Judgment: Judgment normal.        Result Review :                 Assessment and Plan    Diagnoses and all orders for this visit:    1. COVID-19 virus infection (Primary)  Comments:  Significantly improved nearly 10 days out without fever or dyspnea    2. Essential hypertension    Other orders  -     hydroCHLOROthiazide (HYDRODIURIL) 12.5 MG tablet; Take 1 tablet by mouth Daily.  Dispense: 30 tablet; Refill: 1    Blood pressure rechecked  140/98 right    Follow Up   Return Follow-up 6 weeks Dr. Alex with labs prior to next appointment.  Patient was given instructions and counseling regarding her condition or for health maintenance advice. Please see specific information pulled into the AVS if appropriate.   Discharge instructions    Start HCTZ 12.5 mg daily for blood pressure this medication works slowly  Focus on diet decreasing breads pastas and sweets  Foods that are high in sodium which causes fluid retention water retention and hypertension  Consider intermittent fasting  Calories less than 1400  Lots of fiber chicken fish greatly reduce breads pastas and sweets with drive appetite  Slow weight loss over the next year with improved diet,    Send me blood pressure readings in 2 weeks  At least a couple days and heart rate    Follow-up in the office in 6 weeks fasting labs prior to next appointment see Dr. Alex    Push fluids plenty of rest ibuprofen if any fever chills high fever chest pain shortness of breath emergency  room    Omron blood pressure cuff or similar get a good adjustable upper cuff      Infusion today she can go ahead with infusion is reasonable to cancel it as well she is feeling much better she does have risk factors I cannot make the decision for her reviewed risk and benefit  Looks like she is improved nicely already    Any high fever chest pain shortness of breath immediately to the emergency room good communication of blood pressure  Therapeutic lifestyle changes to further decrease blood pressure

## 2022-01-03 NOTE — PROGRESS NOTES
Changed from original order of Bam/richardson by Dr. Alex to sotrovimab per policy    Adria Galvan, PharmD

## 2022-01-03 NOTE — PATIENT INSTRUCTIONS
Discharge instructions    Start HCTZ 12.5 mg daily for blood pressure this medication works slowly  Focus on diet decreasing breads pastas and sweets  Foods that are high in sodium which causes fluid retention water retention and hypertension  Consider intermittent fasting  Calories less than 1400  Lots of fiber chicken fish greatly reduce breads pastas and sweets with drive appetite  Slow weight loss over the next year with improved diet,    Send me blood pressure readings in 2 weeks  At least a couple days and heart rate    Follow-up in the office in 6 weeks fasting labs prior to next appointment see Dr. Alex    Push fluids plenty of rest ibuprofen if any fever chills high fever chest pain shortness of breath emergency room    Omron blood pressure cuff or similar get a good adjustable upper cuff

## 2022-02-14 RX ORDER — DULOXETIN HYDROCHLORIDE 30 MG/1
CAPSULE, DELAYED RELEASE ORAL
Qty: 90 CAPSULE | Refills: 1 | Status: SHIPPED | OUTPATIENT
Start: 2022-02-14 | End: 2022-09-19

## 2022-02-14 NOTE — TELEPHONE ENCOUNTER
Rx Refill Note  Requested Prescriptions     Pending Prescriptions Disp Refills   • DULoxetine (CYMBALTA) 30 MG capsule [Pharmacy Med Name: DULOXETINE HCL 30 MG Capsule Delayed Release Particles] 90 capsule 1     Sig: TAKE 1 CAPSULE EVERY DAY      Last office visit with prescribing clinician: 5/14/2021      Next office visit with prescribing clinician: 5/20/2022            Shonda Garland MA  02/14/22, 10:51 EST

## 2022-02-16 ENCOUNTER — OFFICE VISIT (OUTPATIENT)
Dept: FAMILY MEDICINE CLINIC | Facility: CLINIC | Age: 67
End: 2022-02-16

## 2022-02-16 VITALS
SYSTOLIC BLOOD PRESSURE: 128 MMHG | RESPIRATION RATE: 12 BRPM | WEIGHT: 233.6 LBS | BODY MASS INDEX: 39.88 KG/M2 | HEART RATE: 89 BPM | OXYGEN SATURATION: 97 % | DIASTOLIC BLOOD PRESSURE: 88 MMHG | HEIGHT: 64 IN | TEMPERATURE: 97 F

## 2022-02-16 DIAGNOSIS — Z78.0 POSTMENOPAUSAL: ICD-10-CM

## 2022-02-16 DIAGNOSIS — Z13.6 ENCOUNTER FOR SCREENING FOR VASCULAR DISEASE: ICD-10-CM

## 2022-02-16 DIAGNOSIS — E78.2 HYPERLIPIDEMIA, MIXED: ICD-10-CM

## 2022-02-16 DIAGNOSIS — I10 ESSENTIAL HYPERTENSION: Primary | ICD-10-CM

## 2022-02-16 DIAGNOSIS — Z00.00 HEALTH MAINTENANCE EXAMINATION: ICD-10-CM

## 2022-02-16 PROCEDURE — 99213 OFFICE O/P EST LOW 20 MIN: CPT | Performed by: NURSE PRACTITIONER

## 2022-02-16 NOTE — PROGRESS NOTES
"Chief Complaint  Follow-up (6 week f/u)    Subjective          Emerita Matthews presents to [facility]  Very pleasant patient here today follow-up essential hypertension, recently started low-dose HCTZ she is doing well with this no problem checking her blood pressure at home is in the 120s systolic  Today 128/88, no chest pain no shortness of breath or other complaints.  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 Alex in several months he was unable to get in today        Objective   Vital Signs:   /88   Pulse 89   Temp 97 °F (36.1 °C) (Infrared)   Resp 12   Ht 162.6 cm (64\")   Wt 106 kg (233 lb 9.6 oz)   SpO2 97%   BMI 40.10 kg/m²     Physical Exam  Vitals reviewed.   Constitutional:       Appearance: Normal appearance. She is well-developed.   HENT:      Head: Normocephalic.      Nose: Nose normal.   Eyes:      General: No scleral icterus.     Conjunctiva/sclera: Conjunctivae normal.      Pupils: Pupils are equal, round, and reactive to light.   Neck:      Thyroid: No thyromegaly.      Vascular: No JVD.      Comments: Carotids clear  Cardiovascular:      Rate and Rhythm: Normal rate and regular rhythm.      Heart sounds: Normal heart sounds. No murmur heard.  No friction rub. No gallop.    Pulmonary:      Effort: Pulmonary effort is normal. No respiratory distress.      Breath sounds: Normal breath sounds. No stridor. No wheezing or rales.   Abdominal:      General: Bowel sounds are normal. There is no distension.      Palpations: Abdomen is soft.      Tenderness: There is no abdominal tenderness.      Comments: No hepatosplenomegaly, no ascites,   Musculoskeletal:         General: No tenderness.      Cervical " back: Neck supple.   Lymphadenopathy:      Cervical: No cervical adenopathy.   Skin:     General: Skin is warm and dry.      Findings: No erythema or rash.   Neurological:      General: No focal deficit present.      Mental Status: She is alert and oriented to person, place, and time.      Deep Tendon Reflexes: Reflexes are normal and symmetric.   Psychiatric:         Behavior: Behavior normal.         Thought Content: Thought content normal.         Judgment: Judgment normal.        Result Review :                 Assessment and Plan    Diagnoses and all orders for this visit:    1. Essential hypertension (Primary)  -     CBC & Differential  -     Comprehensive Metabolic Panel  -     Lipid Panel With LDL / HDL Ratio  -     TSH Rfx On Abnormal To Free T4  -     Urinalysis With Microscopic If Indicated (No Culture) - Urine, Clean Catch    2. Health maintenance examination  -     CBC & Differential  -     Comprehensive Metabolic Panel  -     Lipid Panel With LDL / HDL Ratio  -     TSH Rfx On Abnormal To Free T4  -     Urinalysis With Microscopic If Indicated (No Culture) - Urine, Clean Catch    3. Hyperlipidemia, mixed  -     CBC & Differential  -     Comprehensive Metabolic Panel  -     Lipid Panel With LDL / HDL Ratio  -     TSH Rfx On Abnormal To Free T4  -     Urinalysis With Microscopic If Indicated (No Culture) - Urine, Clean Catch    4. Postmenopausal  -     DEXA Bone Density Axial    5. Encounter for screening for vascular disease  -     CT Cardiac Calcium Score Without Dye        Follow Up   No follow-ups on file.  Patient was given instructions and counseling regarding her condition or for health maintenance advice. Please see specific information pulled into the AVS if appropriate.     Discharge instructions continue present plan  Lots of vegetables chicken fish 64 ounces of water  Daily greatly decrease breads Posta sweets fast foods processed foods  Portion control consider intermittent fasting  Some type  of physical activity daily something fine  Such as walking etc.    Keep your follow-up with Dr. Alex,    Consider CT calcium score of the coronary arteries  As a cardiovascular screening for your heart

## 2022-02-16 NOTE — PATIENT INSTRUCTIONS
Discharge instructions continue present plan  Lots of vegetables chicken fish 64 ounces of water  Daily greatly decrease breads Posta sweets fast foods processed foods  Portion control consider intermittent fasting  Some type of physical activity daily something fine  Such as walking etc.    Keep your follow-up with Dr. Alex,    Consider CT calcium score of the coronary arteries  As a cardiovascular screening for your heart

## 2022-02-17 LAB
ALBUMIN SERPL-MCNC: 4.2 G/DL (ref 3.8–4.8)
ALBUMIN/GLOB SERPL: 1.5 {RATIO} (ref 1.2–2.2)
ALP SERPL-CCNC: 59 IU/L (ref 44–121)
ALT SERPL-CCNC: 22 IU/L (ref 0–32)
APPEARANCE UR: CLEAR
AST SERPL-CCNC: 18 IU/L (ref 0–40)
BACTERIA #/AREA URNS HPF: ABNORMAL /[HPF]
BASOPHILS # BLD AUTO: 0 X10E3/UL (ref 0–0.2)
BASOPHILS NFR BLD AUTO: 0 %
BILIRUB SERPL-MCNC: 0.4 MG/DL (ref 0–1.2)
BILIRUB UR QL STRIP: NEGATIVE
BUN SERPL-MCNC: 14 MG/DL (ref 8–27)
BUN/CREAT SERPL: 16 (ref 12–28)
CALCIUM SERPL-MCNC: 9.1 MG/DL (ref 8.7–10.3)
CASTS URNS QL MICRO: ABNORMAL /LPF
CHLORIDE SERPL-SCNC: 104 MMOL/L (ref 96–106)
CHOLEST SERPL-MCNC: 203 MG/DL (ref 100–199)
CO2 SERPL-SCNC: 23 MMOL/L (ref 20–29)
COLOR UR: YELLOW
CREAT SERPL-MCNC: 0.88 MG/DL (ref 0.57–1)
EOSINOPHIL # BLD AUTO: 0.2 X10E3/UL (ref 0–0.4)
EOSINOPHIL NFR BLD AUTO: 3 %
EPI CELLS #/AREA URNS HPF: ABNORMAL /HPF (ref 0–10)
ERYTHROCYTE [DISTWIDTH] IN BLOOD BY AUTOMATED COUNT: 13.3 % (ref 11.7–15.4)
GLOBULIN SER CALC-MCNC: 2.8 G/DL (ref 1.5–4.5)
GLUCOSE SERPL-MCNC: 114 MG/DL (ref 65–99)
GLUCOSE UR QL STRIP: NEGATIVE
HCT VFR BLD AUTO: 45.4 % (ref 34–46.6)
HDLC SERPL-MCNC: 70 MG/DL
HGB BLD-MCNC: 15 G/DL (ref 11.1–15.9)
HGB UR QL STRIP: NEGATIVE
IMM GRANULOCYTES # BLD AUTO: 0 X10E3/UL (ref 0–0.1)
IMM GRANULOCYTES NFR BLD AUTO: 0 %
KETONES UR QL STRIP: NEGATIVE
LDLC SERPL CALC-MCNC: 118 MG/DL (ref 0–99)
LDLC/HDLC SERPL: 1.7 RATIO (ref 0–3.2)
LEUKOCYTE ESTERASE UR QL STRIP: NEGATIVE
LYMPHOCYTES # BLD AUTO: 2.5 X10E3/UL (ref 0.7–3.1)
LYMPHOCYTES NFR BLD AUTO: 36 %
MCH RBC QN AUTO: 28.7 PG (ref 26.6–33)
MCHC RBC AUTO-ENTMCNC: 33 G/DL (ref 31.5–35.7)
MCV RBC AUTO: 87 FL (ref 79–97)
MICRO URNS: ABNORMAL
MONOCYTES # BLD AUTO: 0.6 X10E3/UL (ref 0.1–0.9)
MONOCYTES NFR BLD AUTO: 8 %
NEUTROPHILS # BLD AUTO: 3.7 X10E3/UL (ref 1.4–7)
NEUTROPHILS NFR BLD AUTO: 53 %
NITRITE UR QL STRIP: POSITIVE
PH UR STRIP: 5.5 [PH] (ref 5–7.5)
PLATELET # BLD AUTO: 255 X10E3/UL (ref 150–450)
POTASSIUM SERPL-SCNC: 4.3 MMOL/L (ref 3.5–5.2)
PROT SERPL-MCNC: 7 G/DL (ref 6–8.5)
PROT UR QL STRIP: NEGATIVE
RBC # BLD AUTO: 5.23 X10E6/UL (ref 3.77–5.28)
RBC #/AREA URNS HPF: ABNORMAL /HPF (ref 0–2)
SODIUM SERPL-SCNC: 142 MMOL/L (ref 134–144)
SP GR UR STRIP: 1.02 (ref 1–1.03)
TRIGL SERPL-MCNC: 83 MG/DL (ref 0–149)
TSH SERPL DL<=0.005 MIU/L-ACNC: 1.51 UIU/ML (ref 0.45–4.5)
UROBILINOGEN UR STRIP-MCNC: 0.2 MG/DL (ref 0.2–1)
VLDLC SERPL CALC-MCNC: 15 MG/DL (ref 5–40)
WBC # BLD AUTO: 6.9 X10E3/UL (ref 3.4–10.8)
WBC #/AREA URNS HPF: ABNORMAL /HPF (ref 0–5)

## 2022-03-03 RX ORDER — HYDROCHLOROTHIAZIDE 12.5 MG/1
TABLET ORAL
Qty: 90 TABLET | Refills: 0 | Status: SHIPPED | OUTPATIENT
Start: 2022-03-03 | End: 2022-06-20

## 2022-03-22 ENCOUNTER — HOSPITAL ENCOUNTER (OUTPATIENT)
Dept: CT IMAGING | Facility: HOSPITAL | Age: 67
Discharge: HOME | End: 2022-03-22
Admitting: NURSE PRACTITIONER

## 2022-03-22 PROCEDURE — 75571 CT HRT W/O DYE W/CA TEST: CPT

## 2022-05-09 DIAGNOSIS — R73.09 ELEVATED GLUCOSE: ICD-10-CM

## 2022-05-09 DIAGNOSIS — E55.9 VITAMIN D DEFICIENCY: ICD-10-CM

## 2022-05-09 DIAGNOSIS — E78.2 HYPERLIPIDEMIA, MIXED: Primary | ICD-10-CM

## 2022-05-09 DIAGNOSIS — R94.6 THYROID FUNCTION TEST ABNORMAL: ICD-10-CM

## 2022-05-14 LAB
25(OH)D3+25(OH)D2 SERPL-MCNC: 29.1 NG/ML (ref 30–100)
ALBUMIN SERPL-MCNC: 4.4 G/DL (ref 3.8–4.8)
ALBUMIN/GLOB SERPL: 1.9 {RATIO} (ref 1.2–2.2)
ALP SERPL-CCNC: 52 IU/L (ref 44–121)
ALT SERPL-CCNC: 24 IU/L (ref 0–32)
AST SERPL-CCNC: 20 IU/L (ref 0–40)
BASOPHILS # BLD AUTO: 0 X10E3/UL (ref 0–0.2)
BASOPHILS NFR BLD AUTO: 0 %
BILIRUB SERPL-MCNC: 0.6 MG/DL (ref 0–1.2)
BUN SERPL-MCNC: 17 MG/DL (ref 8–27)
BUN/CREAT SERPL: 18 (ref 12–28)
CALCIUM SERPL-MCNC: 9.6 MG/DL (ref 8.7–10.3)
CHLORIDE SERPL-SCNC: 101 MMOL/L (ref 96–106)
CHOLEST SERPL-MCNC: 199 MG/DL (ref 100–199)
CO2 SERPL-SCNC: 25 MMOL/L (ref 20–29)
CREAT SERPL-MCNC: 0.93 MG/DL (ref 0.57–1)
EGFRCR SERPLBLD CKD-EPI 2021: 68 ML/MIN/1.73
EOSINOPHIL # BLD AUTO: 0.2 X10E3/UL (ref 0–0.4)
EOSINOPHIL NFR BLD AUTO: 3 %
ERYTHROCYTE [DISTWIDTH] IN BLOOD BY AUTOMATED COUNT: 13.8 % (ref 11.7–15.4)
GLOBULIN SER CALC-MCNC: 2.3 G/DL (ref 1.5–4.5)
GLUCOSE SERPL-MCNC: 113 MG/DL (ref 65–99)
HBA1C MFR BLD: 6 % (ref 4.8–5.6)
HCT VFR BLD AUTO: 47.3 % (ref 34–46.6)
HDLC SERPL-MCNC: 64 MG/DL
HGB BLD-MCNC: 15.4 G/DL (ref 11.1–15.9)
IMM GRANULOCYTES # BLD AUTO: 0 X10E3/UL (ref 0–0.1)
IMM GRANULOCYTES NFR BLD AUTO: 0 %
LDLC SERPL CALC-MCNC: 119 MG/DL (ref 0–99)
LDLC/HDLC SERPL: 1.9 RATIO (ref 0–3.2)
LYMPHOCYTES # BLD AUTO: 2.4 X10E3/UL (ref 0.7–3.1)
LYMPHOCYTES NFR BLD AUTO: 33 %
MCH RBC QN AUTO: 28.7 PG (ref 26.6–33)
MCHC RBC AUTO-ENTMCNC: 32.6 G/DL (ref 31.5–35.7)
MCV RBC AUTO: 88 FL (ref 79–97)
MONOCYTES # BLD AUTO: 0.6 X10E3/UL (ref 0.1–0.9)
MONOCYTES NFR BLD AUTO: 9 %
NEUTROPHILS # BLD AUTO: 4 X10E3/UL (ref 1.4–7)
NEUTROPHILS NFR BLD AUTO: 55 %
PLATELET # BLD AUTO: 233 X10E3/UL (ref 150–450)
POTASSIUM SERPL-SCNC: 4.3 MMOL/L (ref 3.5–5.2)
PROT SERPL-MCNC: 6.7 G/DL (ref 6–8.5)
RBC # BLD AUTO: 5.37 X10E6/UL (ref 3.77–5.28)
SODIUM SERPL-SCNC: 141 MMOL/L (ref 134–144)
T4 FREE SERPL-MCNC: 0.92 NG/DL (ref 0.82–1.77)
TRIGL SERPL-MCNC: 92 MG/DL (ref 0–149)
TSH SERPL DL<=0.005 MIU/L-ACNC: 2 UIU/ML (ref 0.45–4.5)
VLDLC SERPL CALC-MCNC: 16 MG/DL (ref 5–40)
WBC # BLD AUTO: 7.3 X10E3/UL (ref 3.4–10.8)

## 2022-06-02 ENCOUNTER — OFFICE VISIT (OUTPATIENT)
Dept: FAMILY MEDICINE CLINIC | Facility: CLINIC | Age: 67
End: 2022-06-02

## 2022-06-02 VITALS
WEIGHT: 226.3 LBS | TEMPERATURE: 97.7 F | HEART RATE: 94 BPM | RESPIRATION RATE: 12 BRPM | HEIGHT: 64 IN | BODY MASS INDEX: 38.64 KG/M2 | OXYGEN SATURATION: 96 % | SYSTOLIC BLOOD PRESSURE: 120 MMHG | DIASTOLIC BLOOD PRESSURE: 80 MMHG

## 2022-06-02 DIAGNOSIS — Z00.00 MEDICARE ANNUAL WELLNESS VISIT, SUBSEQUENT: Primary | ICD-10-CM

## 2022-06-02 DIAGNOSIS — E78.2 MIXED HYPERLIPIDEMIA: ICD-10-CM

## 2022-06-02 DIAGNOSIS — Z23 NEED FOR VACCINATION: ICD-10-CM

## 2022-06-02 DIAGNOSIS — E55.9 VITAMIN D DEFICIENCY: ICD-10-CM

## 2022-06-02 DIAGNOSIS — R43.0 LOSS OF SMELL: ICD-10-CM

## 2022-06-02 DIAGNOSIS — R73.03 PREDIABETES: ICD-10-CM

## 2022-06-02 PROCEDURE — G0438 PPPS, INITIAL VISIT: HCPCS | Performed by: NURSE PRACTITIONER

## 2022-06-02 PROCEDURE — 93000 ELECTROCARDIOGRAM COMPLETE: CPT | Performed by: NURSE PRACTITIONER

## 2022-06-02 PROCEDURE — 91305 COVID-19 (PFIZER) 12+ YRS: CPT | Performed by: NURSE PRACTITIONER

## 2022-06-02 PROCEDURE — 0051A COVID-19 (PFIZER) 12+ YRS: CPT | Performed by: NURSE PRACTITIONER

## 2022-06-02 RX ORDER — METFORMIN HYDROCHLORIDE 500 MG/1
500 TABLET, EXTENDED RELEASE ORAL
Qty: 90 TABLET | Refills: 1 | Status: SHIPPED | OUTPATIENT
Start: 2022-06-02 | End: 2022-08-30 | Stop reason: SDUPTHER

## 2022-06-02 RX ORDER — ATORVASTATIN CALCIUM 10 MG/1
10 TABLET, FILM COATED ORAL DAILY
Qty: 90 TABLET | Refills: 3 | Status: SHIPPED | OUTPATIENT
Start: 2022-06-02 | End: 2022-08-30 | Stop reason: SDUPTHER

## 2022-06-02 NOTE — PATIENT INSTRUCTIONS
Discharge instructions    Start Lipitor 10 mg daily to decrease risk of stroke and heart attack  Next month as long as doing well start metformin with breakfast hold if loose stools  And try again a few weeks later    Follow American diabetes Association website diabetes.org for cooking tips and dietary plan basically you should be following American diabetes Association diet for diabetes has prediabetes is essentially the same    Gradual changes from long-lasting results, as long as you are doing well follow-up with Dr. Alex in 6 months, continue present medication.  COVID-vaccine wait least 6 weeks for other major vaccines.  Should you get signs or symptoms of a virus you or your  check frequently

## 2022-06-02 NOTE — PROGRESS NOTES
"Chief Complaint  Medicare Wellness-subsequent    Subjective        Emerita Matthews presents to [facility]  Very pleasant patient here today for Medicare wellness as well as follow-up prediabetes, essential hypertension controlled mild hyperlipidemia, LDL goal less than 70.  Mild vitamin D deficiency  Patient is doing well, mammogram up-to-date colonoscopy up-to-date please get your therapy set up for COVID   3 previous COVID vaccines need last booster  Recent calcium score pretty good score 3 LAD otherwise everything clear.    Social history no tobacco drug or alcohol abuse  Past medical history as above        Objective   Vital Signs:  /80   Pulse 94   Temp 97.7 °F (36.5 °C) (Infrared)   Resp 12   Ht 162.6 cm (64\")   Wt 103 kg (226 lb 4.8 oz)   SpO2 96%   BMI 38.84 kg/m²           Physical Exam  Vitals reviewed.   Constitutional:       Appearance: Normal appearance. She is well-developed.      Comments: Milli appears well younger than stated age   HENT:      Head: Normocephalic.      Nose: Nose normal.   Eyes:      General: No scleral icterus.     Extraocular Movements: Extraocular movements intact.      Conjunctiva/sclera: Conjunctivae normal.      Pupils: Pupils are equal, round, and reactive to light.   Neck:      Thyroid: No thyromegaly.      Vascular: No JVD.      Comments: Carotids clear thyroid normal  Cardiovascular:      Rate and Rhythm: Normal rate and regular rhythm.      Heart sounds: Normal heart sounds. No murmur heard.    No friction rub. No gallop.   Pulmonary:      Effort: Pulmonary effort is normal. No respiratory distress.      Breath sounds: Normal breath sounds. No stridor. No wheezing or rales.   Abdominal:      General: Bowel sounds are normal. There is no distension.      Palpations: Abdomen is soft.      Tenderness: There is no abdominal tenderness.      Comments: No hepatosplenomegaly, no ascites,   Musculoskeletal:         General: No tenderness. "      Cervical back: Neck supple.   Lymphadenopathy:      Cervical: No cervical adenopathy.   Skin:     General: Skin is warm and dry.      Findings: No erythema or rash.   Neurological:      Mental Status: She is alert and oriented to person, place, and time.      Deep Tendon Reflexes: Reflexes are normal and symmetric.   Psychiatric:         Behavior: Behavior normal.         Thought Content: Thought content normal.         Judgment: Judgment normal.        Result Review :                Assessment and Plan   Diagnoses and all orders for this visit:    1. Medicare annual wellness visit, subsequent (Primary)  -     ECG 12 Lead    2. Loss of smell  Comments:  years seen ent    3. Vitamin D deficiency    4. Mixed hyperlipidemia  -     ECG 12 Lead    5. Prediabetes  -     ECG 12 Lead    Other orders  -     metFORMIN ER (GLUCOPHAGE-XR) 500 MG 24 hr tablet; Take 1 tablet by mouth Daily With Breakfast. For prediabetes  Dispense: 90 tablet; Refill: 1  -     atorvastatin (Lipitor) 10 MG tablet; Take 1 tablet by mouth Daily. To decrease risk of stroke and heart attack  Dispense: 90 tablet; Refill: 3             Follow Up   Return in about 6 months (around 12/2/2022), or lab dr spears.  Patient was given instructions and counseling regarding her condition or for health maintenance advice. Please see specific information pulled into the AVS if appropriate.     Resume vitamin D 1000 international units daily  Healthy diet regular exercise calcium vitamin D    Follow-up Dr. Spears    Healthy diet decreased breads Posta sweets more vegetables chicken fish gradual weight loss    Discharge instructions    Start Lipitor 10 mg daily to decrease risk of stroke and heart attack  Next month as long as doing well start metformin with breakfast hold if loose stools  And try again a few weeks later    Follow American diabetes Association website diabetes.org for cooking tips and dietary plan basically you should be following American  diabetes Association diet for diabetes has prediabetes is essentially the same    Gradual changes from long-lasting results, as long as you are doing well follow-up with Dr. Alex in 6 months, continue present medication.  COVID-vaccine wait least 6 weeks for other major vaccines.  Should you get signs or symptoms of a virus you or your  check frequently

## 2022-06-02 NOTE — PROGRESS NOTES
The ABCs of the Annual Wellness Visit  Subsequent Medicare Wellness Visit    Chief Complaint   Patient presents with   • Medicare Wellness-subsequent      Subjective    History of Present Illness:  Emerita Matthews is a 66 y.o. female who presents for a Subsequent Medicare Wellness Visit.    The following portions of the patient's history were reviewed and   updated as appropriate: allergies, current medications, past family history, past medical history, past social history, past surgical history and problem list.    Compared to one year ago, the patient feels her physical   health is the same.    Compared to one year ago, the patient feels her mental   health is the same.    Recent Hospitalizations:  She was not admitted to the hospital during the last year.       Current Medical Providers:  Patient Care Team:  Josselyn Alex MD as PCP - General (Internal Medicine)    Outpatient Medications Prior to Visit   Medication Sig Dispense Refill   • Ascorbic Acid (VITAMIN C PO) Take  by mouth Daily.     • cholecalciferol (VITAMIN D3) 1000 UNITS tablet Take 1,000 Units by mouth Daily.     • DULoxetine (CYMBALTA) 30 MG capsule TAKE 1 CAPSULE EVERY DAY 90 capsule 1   • hydroCHLOROthiazide (HYDRODIURIL) 12.5 MG tablet TAKE ONE TABLET BY MOUTH DAILY 90 tablet 0   • Loratadine (Claritin) 10 MG capsule Take 1 capsule by mouth Daily. 90 each 3   • acetaminophen (TYLENOL) 500 MG tablet Take 500 mg by mouth Every 6 (Six) Hours As Needed for Mild Pain .     • albuterol sulfate  (90 Base) MCG/ACT inhaler Inhale 2 puffs Every 6 (Six) Hours As Needed for Wheezing. 18 g 0   • methylPREDNISolone (MEDROL) 4 MG dose pack Take as directed on package instructions. 21 tablet 0   • azithromycin (ZITHROMAX) 250 MG tablet Take first 2 tablets together, then 1 every day until finished. 6 tablet 0   • azithromycin (ZITHROMAX) 250 MG tablet Take 2 tablets the first day, then 1 tablet daily for 4 days. 6 tablet 0   • fluticasone-salmeterol  "(Advair Diskus) 250-50 MCG/DOSE DISKUS Inhale 1 puff 2 (Two) Times a Day. 60 each 0   • Melatonin 10 MG tablet Take 20 mg by mouth Every Night.     • Probiotic Product (CULTURELLE PROBIOTICS PO) Take 1 Units by mouth.       No facility-administered medications prior to visit.       No opioid medication identified on active medication list. I have reviewed chart for other potential  high risk medication/s and harmful drug interactions in the elderly.          Aspirin is not on active medication list.  Aspirin use is not indicated based on review of current medical condition/s. Risk of harm outweighs potential benefits.  .    Patient Active Problem List   Diagnosis   • Anxiety   • Mixed anxiety depressive disorder   • Neck pain   • Loss of sense of smell   • Vitamin D deficiency   • Plantar fasciitis of right foot   • Reactive airway disease   • Pyelonephritis, acute   • Nephrocalcinosis   • History of bacteremia   • Seasonal allergies   • Acute lower GI bleeding   • Asthma   • COVID-19 virus infection     Advance Care Planning  Advance Directive is not on file.  ACP discussion was declined by the patient. Patient does not have an advance directive, information provided.          Objective    Vitals:    06/02/22 0939   BP: 120/80   Pulse: 94   Resp: 12   Temp: 97.7 °F (36.5 °C)   TempSrc: Infrared   SpO2: 96%   Weight: 103 kg (226 lb 4.8 oz)   Height: 162.6 cm (64\")   PainSc: 0-No pain     Body mass index is 38.84 kg/m².  Class 2 Severe Obesity (BMI >=35 and <=39.9). Obesity-related health conditions include the following: none. Obesity is .. BMI is is above average; BMI management plan is completed. We discussed portion control and increasing exercise.    Does the patient have evidence of cognitive impairment? No    Physical Exam  Lab Results   Component Value Date    CHLPL 199 05/13/2022    TRIG 92 05/13/2022    HDL 64 05/13/2022     (H) 05/13/2022    VLDL 16 05/13/2022    HGBA1C 6.0 (H) 05/13/2022      "       HEALTH RISK ASSESSMENT    Smoking Status:  Social History     Tobacco Use   Smoking Status Never Smoker   Smokeless Tobacco Never Used     Alcohol Consumption:  Social History     Substance and Sexual Activity   Alcohol Use Yes    Comment: 3x per year     Fall Risk Screen:    TITO Fall Risk Assessment was completed, and patient is at LOW risk for falls.Assessment completed on:6/2/2022    Depression Screening:  PHQ-2/PHQ-9 Depression Screening 6/2/2022   Retired PHQ-9 Total Score -   Retired Total Score -   Little Interest or Pleasure in Doing Things 0-->not at all   Feeling Down, Depressed or Hopeless 0-->not at all   PHQ-9: Brief Depression Severity Measure Score 0       Health Habits and Functional and Cognitive Screening:  Functional & Cognitive Status 6/2/2022   Do you have difficulty preparing food and eating? No   Do you have difficulty bathing yourself, getting dressed or grooming yourself? No   Do you have difficulty using the toilet? No   Do you have difficulty moving around from place to place? No   Do you have trouble with steps or getting out of a bed or a chair? No   Current Diet Well Balanced Diet   Dental Exam Up to date   Eye Exam Up to date   Exercise (times per week) 2 times per week   Current Exercises Include Walking   Do you need help using the phone?  No   Are you deaf or do you have serious difficulty hearing?  No   Do you need help with transportation? No   Do you need help shopping? No   Do you need help preparing meals?  No   Do you need help with housework?  No   Do you need help with laundry? No   Do you need help taking your medications? No   Do you need help managing money? No   Do you ever drive or ride in a car without wearing a seat belt? No   Have you felt unusual stress, anger or loneliness in the last month? No   Who do you live with? Spouse   If you need help, do you have trouble finding someone available to you? No   Have you been bothered in the last four weeks by  sexual problems? No   Do you have difficulty concentrating, remembering or making decisions? Yes       Age-appropriate Screening Schedule:  Refer to the list below for future screening recommendations based on patient's age, sex and/or medical conditions. Orders for these recommended tests are listed in the plan section. The patient has been provided with a written plan.    Health Maintenance   Topic Date Due   • ZOSTER VACCINE (2 of 3) 11/10/2016   • DXA SCAN  12/01/2019   • INFLUENZA VACCINE  08/01/2022   • PAP SMEAR  10/18/2022   • LIPID PANEL  05/13/2023   • MAMMOGRAM  08/12/2023   • TDAP/TD VACCINES (3 - Td or Tdap) 12/26/2026              Assessment & Plan   CMS Preventative Services Quick Reference  Risk Factors Identified During Encounter  Fall Risk-High or Moderate  Immunizations Discussed/Encouraged (specific Immunizations; COVID19  The above risks/problems have been discussed with the patient.  Follow up actions/plans if indicated are seen below in the Assessment/Plan Section.  Pertinent information has been shared with the patient in the After Visit Summary.    Diagnoses and all orders for this visit:    1. Medicare annual wellness visit, subsequent (Primary)    2. Loss of smell  Comments:  years seen ent    3. Vitamin D deficiency    4. Mixed hyperlipidemia    5. Prediabetes    Other orders  -     metFORMIN ER (GLUCOPHAGE-XR) 500 MG 24 hr tablet; Take 1 tablet by mouth Daily With Breakfast. For prediabetes  Dispense: 90 tablet; Refill: 1  -     atorvastatin (Lipitor) 10 MG tablet; Take 1 tablet by mouth Daily. To decrease risk of stroke and heart attack  Dispense: 90 tablet; Refill: 3        Follow Up:   No follow-ups on file.     An After Visit Summary and PPPS were made available to the patient.

## 2022-06-02 NOTE — PROGRESS NOTES
Procedure   Procedures     Adult ECG Report     Name: Emerita Matthews   Age: 66 y.o.   Gender: female       Rate: 81   Rhythm: normal sinus rhythm   QRS Axis: nml   CT Interval: 159   QRS Duration: 92   QTc: 415   Voltages: .   Conduction Disturbances: none   Other Abnormalities: none     Narrative Interpretation: Normal EKG indication hyperlipidemia no change previous EKG in 2019

## 2022-06-20 RX ORDER — HYDROCHLOROTHIAZIDE 12.5 MG/1
TABLET ORAL
Qty: 90 TABLET | Refills: 1 | Status: SHIPPED | OUTPATIENT
Start: 2022-06-20 | End: 2022-08-30 | Stop reason: SDUPTHER

## 2022-06-20 NOTE — TELEPHONE ENCOUNTER
PATIENT CALLED TO CHECK ON THIS REQUEST, SHE HAS BEEN COMPLETELY OUT SINCE Saturday.    PLEASE ADVISE

## 2022-06-20 NOTE — TELEPHONE ENCOUNTER
Rx Refill Note  Requested Prescriptions     Pending Prescriptions Disp Refills   • hydroCHLOROthiazide (HYDRODIURIL) 12.5 MG tablet [Pharmacy Med Name: hydroCHLOROthiazide 12.5 MG TABLET] 90 tablet 0     Sig: TAKE ONE TABLET BY MOUTH DAILY      Last office visit with prescribing clinician:    05/14/2021  Next office visit with prescribing clinician:  12/02/2022           Shanti Banuelos MA  06/20/22, 08:29 EDT

## 2022-06-29 ENCOUNTER — TELEPHONE (OUTPATIENT)
Dept: FAMILY MEDICINE CLINIC | Facility: CLINIC | Age: 67
End: 2022-06-29

## 2022-06-29 NOTE — TELEPHONE ENCOUNTER
Probably not related but she can stop it nonetheless for 3 weeks then resume    If statin induced myalgia it would be global likely  Muscles bilateral legs and arms achiness as opposed to a focal pain  However if she still having Pain urgent recheck to make sure she did not have a blood clot etc. or something else  For emergency room if severe    Thank you

## 2022-06-29 NOTE — TELEPHONE ENCOUNTER
Caller: Emerita Matthews    Relationship: Self    Best call back number: [phone] (H)    What medications are you currently taking:   Current Outpatient Medications on File Prior to Visit   Medication Sig Dispense Refill   • Ascorbic Acid (VITAMIN C PO) Take  by mouth Daily.     • atorvastatin (Lipitor) 10 MG tablet Take 1 tablet by mouth Daily. To decrease risk of stroke and heart attack 90 tablet 3   • cholecalciferol (VITAMIN D3) 1000 UNITS tablet Take 1,000 Units by mouth Daily.     • DULoxetine (CYMBALTA) 30 MG capsule TAKE 1 CAPSULE EVERY DAY 90 capsule 1   • hydroCHLOROthiazide (HYDRODIURIL) 12.5 MG tablet TAKE ONE TABLET BY MOUTH DAILY 90 tablet 1   • Loratadine (Claritin) 10 MG capsule Take 1 capsule by mouth Daily. 90 each 3   • metFORMIN ER (GLUCOPHAGE-XR) 500 MG 24 hr tablet Take 1 tablet by mouth Daily With Breakfast. For prediabetes 90 tablet 1   • methylPREDNISolone (MEDROL) 4 MG dose pack Take as directed on package instructions. 21 tablet 0     No current facility-administered medications on file prior to visit.          When did you start taking these medications: 06/02/2022     Which medication are you concerned about: atorvastatin (Lipitor) 10 MG tablet    Who prescribed you this medication: JAMES EPLEY    What are your concerns: PATIENT STATES HER RIGHT CALF IS HURTING AND SHE QUIT TAKING THE MEDICATION TWO DAYS AGO DUE TO THIS AND IS ASKING IF SHE CAN BE CHANGED TO SOMETHING DIFFERENT    How long have you had these concerns: SINCE ABOUT 1 WEEK AGO

## 2022-08-23 ENCOUNTER — HOSPITAL ENCOUNTER (OUTPATIENT)
Dept: BONE DENSITY | Facility: HOSPITAL | Age: 67
Discharge: HOME | End: 2022-08-23
Admitting: NURSE PRACTITIONER

## 2022-08-23 PROCEDURE — 77080 DXA BONE DENSITY AXIAL: CPT

## 2022-08-30 ENCOUNTER — TELEPHONE (OUTPATIENT)
Dept: FAMILY MEDICINE CLINIC | Facility: CLINIC | Age: 67
End: 2022-08-30

## 2022-08-30 RX ORDER — ATORVASTATIN CALCIUM 10 MG/1
10 TABLET, FILM COATED ORAL DAILY
Qty: 90 TABLET | Refills: 3 | Status: SHIPPED | OUTPATIENT
Start: 2022-08-30

## 2022-08-30 RX ORDER — HYDROCHLOROTHIAZIDE 12.5 MG/1
12.5 TABLET ORAL DAILY
Qty: 90 TABLET | Refills: 1 | Status: SHIPPED | OUTPATIENT
Start: 2022-08-30 | End: 2023-02-13

## 2022-08-30 RX ORDER — METFORMIN HYDROCHLORIDE 500 MG/1
500 TABLET, EXTENDED RELEASE ORAL
Qty: 90 TABLET | Refills: 1 | Status: SHIPPED | OUTPATIENT
Start: 2022-08-30 | End: 2023-02-13

## 2022-08-30 NOTE — TELEPHONE ENCOUNTER
Caller: MatthewsEmerita    Relationship: Self    Best call back number: [phone]     Requested Prescriptions:    hydroCHLOROthiazide (HYDRODIURIL) 12.5 MG tablet      atorvastatin (Lipitor) 10 MG tablet      metFORMIN ER (GLUCOPHAGE-XR) 500 MG 24 hr tablet     Pharmacy where request should be sent:    [facility] (Now [facility]) - [facility] [address] - [phone]  - [phone]   [phone]  Additional details provided by patient: PATIENT IS CALLING TO REQUEST NEW 90 DAY PRESCRIPTIONS WITH REFILLS FOR THE ABOVE MEDICATIONS.    Does the patient have less than a 3 day supply:  [] Yes  [x] No    Garcia Bach Rep   08/30/22 10:00 EDT     PLEASE ADVISE.

## 2022-08-30 NOTE — TELEPHONE ENCOUNTER
Rx Refill Note  Requested Prescriptions     Pending Prescriptions Disp Refills   • hydroCHLOROthiazide (HYDRODIURIL) 12.5 MG tablet 90 tablet 1     Sig: Take 1 tablet by mouth Daily.   • atorvastatin (Lipitor) 10 MG tablet 90 tablet 3     Sig: Take 1 tablet by mouth Daily. To decrease risk of stroke and heart attack   • metFORMIN ER (GLUCOPHAGE-XR) 500 MG 24 hr tablet 90 tablet 1     Sig: Take 1 tablet by mouth Daily With Breakfast. For prediabetes      Last office visit with prescribing clinician: 5/14/2021      Next office visit with prescribing clinician: 12/2/2022            Shonda Garland MA  08/30/22, 10:10 EDT

## 2022-09-12 ENCOUNTER — TRANSCRIBE ORDERS (OUTPATIENT)
Dept: SLEEP MEDICINE | Facility: HOSPITAL | Age: 67
End: 2022-09-12

## 2022-09-12 DIAGNOSIS — J45.909 ASTHMA, UNSPECIFIED ASTHMA SEVERITY, UNSPECIFIED WHETHER COMPLICATED, UNSPECIFIED WHETHER PERSISTENT: ICD-10-CM

## 2022-09-12 DIAGNOSIS — R06.83 SNORING: Primary | ICD-10-CM

## 2022-09-15 ENCOUNTER — TRANSCRIBE ORDERS (OUTPATIENT)
Dept: SLEEP MEDICINE | Facility: HOSPITAL | Age: 67
End: 2022-09-15

## 2022-09-15 DIAGNOSIS — R06.83 SNORING: Primary | ICD-10-CM

## 2022-09-19 RX ORDER — DULOXETIN HYDROCHLORIDE 30 MG/1
CAPSULE, DELAYED RELEASE ORAL
Qty: 90 CAPSULE | Refills: 1 | Status: SHIPPED | OUTPATIENT
Start: 2022-09-19

## 2022-09-19 RX ORDER — LORATADINE 10 MG/1
TABLET ORAL
Qty: 90 TABLET | Refills: 0 | Status: SHIPPED | OUTPATIENT
Start: 2022-09-19 | End: 2023-03-21

## 2022-09-19 NOTE — TELEPHONE ENCOUNTER
Rx Refill Note  Requested Prescriptions     Pending Prescriptions Disp Refills   • loratadine (CLARITIN) 10 MG tablet [Pharmacy Med Name: LORATADINE 10 MG Tablet] 90 tablet 0     Sig: TAKE 1 TABLET EVERY DAY   • DULoxetine (CYMBALTA) 30 MG capsule [Pharmacy Med Name: DULOXETINE HCL 30 MG Capsule Delayed Release Particles] 90 capsule 1     Sig: TAKE 1 CAPSULE EVERY DAY      Last office visit with prescribing clinician: 5/14/2021      Next office visit with prescribing clinician: 12/2/2022            Shonda Garland MA  09/19/22, 16:28 EDT

## 2022-09-21 ENCOUNTER — HOSPITAL ENCOUNTER (OUTPATIENT)
Dept: SLEEP MEDICINE | Facility: HOSPITAL | Age: 67
Discharge: HOME | End: 2022-09-21
Admitting: INTERNAL MEDICINE

## 2022-09-21 VITALS — WEIGHT: 226 LBS | HEIGHT: 64 IN | BODY MASS INDEX: 38.58 KG/M2

## 2022-09-21 DIAGNOSIS — R06.83 SNORING: ICD-10-CM

## 2022-09-21 PROCEDURE — 95810 POLYSOM 6/> YRS 4/> PARAM: CPT

## 2022-09-27 ENCOUNTER — TELEPHONE (OUTPATIENT)
Dept: SLEEP MEDICINE | Facility: HOSPITAL | Age: 67
End: 2022-09-27

## 2022-12-02 ENCOUNTER — OFFICE VISIT (OUTPATIENT)
Dept: FAMILY MEDICINE CLINIC | Facility: CLINIC | Age: 67
End: 2022-12-02

## 2022-12-02 VITALS
WEIGHT: 221.6 LBS | DIASTOLIC BLOOD PRESSURE: 82 MMHG | HEART RATE: 74 BPM | OXYGEN SATURATION: 99 % | TEMPERATURE: 96.7 F | SYSTOLIC BLOOD PRESSURE: 122 MMHG | HEIGHT: 64 IN | RESPIRATION RATE: 16 BRPM | BODY MASS INDEX: 37.83 KG/M2

## 2022-12-02 DIAGNOSIS — E55.9 VITAMIN D DEFICIENCY: Primary | ICD-10-CM

## 2022-12-02 DIAGNOSIS — R73.03 PREDIABETES: ICD-10-CM

## 2022-12-02 DIAGNOSIS — Z12.39 ENCOUNTER FOR BREAST CANCER SCREENING OTHER THAN MAMMOGRAM: ICD-10-CM

## 2022-12-02 DIAGNOSIS — E78.2 MIXED HYPERLIPIDEMIA: ICD-10-CM

## 2022-12-02 DIAGNOSIS — Z23 ENCOUNTER FOR IMMUNIZATION: ICD-10-CM

## 2022-12-02 DIAGNOSIS — Z12.31 ENCOUNTER FOR SCREENING MAMMOGRAM FOR BREAST CANCER: ICD-10-CM

## 2022-12-02 PROCEDURE — 91312 COVID-19 (PFIZER) BIVALENT BOOSTER 12+YRS: CPT | Performed by: INTERNAL MEDICINE

## 2022-12-02 PROCEDURE — 0124A COVID-19 (PFIZER) BIVALENT BOOSTER 12+YRS: CPT | Performed by: INTERNAL MEDICINE

## 2022-12-02 PROCEDURE — 99214 OFFICE O/P EST MOD 30 MIN: CPT | Performed by: INTERNAL MEDICINE

## 2022-12-02 NOTE — PROGRESS NOTES
"Chief Complaint  Vitamin D Deficiency    Subjective        Emerita Matthews presents to [facility]  History of Present Illness  Here for f/u on VDD on otc D3 1000 iu/day plus calcium that contains 1000 iu D3, HL on lipitor, HTN on HCTZ, NIDDM on metformin  mg qd.  She admits to eating a lot of sweets and eats large quantities.  She is asking about a way to get free from her food/sweet addiction    Had flu shot 2 months ago with Dr. Munoz pulmonary b/c she smokes marijuana nightly.   HL on lipitor 10 mg qd.  Depression on cymbalta 30 mg qd  Prediabetes on metformin  mg qd  Objective   Vital Signs:  /82 (BP Location: Right arm, Patient Position: Sitting, Cuff Size: Large Adult)   Pulse 74   Temp 96.7 °F (35.9 °C) (Temporal)   Resp 16   Ht 162.6 cm (64\")   Wt 101 kg (221 lb 9.6 oz)   SpO2 99%   BMI 38.04 kg/m²   Estimated body mass index is 38.04 kg/m² as calculated from the following:    Height as of this encounter: 162.6 cm (64\").    Weight as of this encounter: 101 kg (221 lb 9.6 oz).          Physical Exam  Vitals and nursing note reviewed.   Constitutional:       Appearance: Normal appearance. She is well-developed.   HENT:      Head: Normocephalic and atraumatic.      Right Ear: External ear normal.      Left Ear: External ear normal.   Eyes:      Extraocular Movements: Extraocular movements intact.      Conjunctiva/sclera: Conjunctivae normal.   Neck:      Vascular: No carotid bruit.   Cardiovascular:      Rate and Rhythm: Normal rate and regular rhythm.      Heart sounds: Normal heart sounds.      Comments: No bruits  Pulmonary:      Effort: Pulmonary effort is normal. No respiratory distress.      Breath sounds: Normal breath sounds. No wheezing or rales.   Abdominal:      General: Bowel sounds are normal. There is no distension.      Palpations: Abdomen is soft. There is no mass.      Tenderness: There is no abdominal tenderness.   Musculoskeletal:      " Cervical back: Neck supple.   Lymphadenopathy:      Cervical: No cervical adenopathy.   Skin:     General: Skin is warm.   Neurological:      General: No focal deficit present.      Mental Status: She is alert and oriented to person, place, and time.   Psychiatric:         Mood and Affect: Mood normal.         Behavior: Behavior normal.         Thought Content: Thought content normal.         Judgment: Judgment normal.        Result Review :                Assessment and Plan   Diagnoses and all orders for this visit:    1. Vitamin D deficiency (Primary)  -     Vitamin D,25-Hydroxy    2. Mixed hyperlipidemia  -     CBC & Differential  -     Comprehensive Metabolic Panel  -     Hemoglobin A1c  -     Lipid Panel With LDL / HDL Ratio  -     TSH  -     T4, Free    3. Prediabetes  -     CBC & Differential  -     Comprehensive Metabolic Panel  -     Hemoglobin A1c  -     Lipid Panel With LDL / HDL Ratio  -     TSH  -     T4, Free    4. Encounter for breast cancer screening other than mammogram    5. Encounter for screening mammogram for breast cancer  -     Mammo Screening Bilateral With CAD; Future    6. Encounter for immunization  -     COVID-19 Bivalent Booster (Pfizer) 12+yrs    she wanted the bivalent covid booster today  We discussed the risks of smoking today in the office  We also discussed strategies to losing weight, joining support group for overeating, we also discussed Wygovy as a possibility for weight loss.    Screening MMG ordered  Fasting labs ordered  Vaccinations reviewed           Follow Up   Return in about 6 months (around 6/2/2023).  Patient was given instructions and counseling regarding her condition or for health maintenance advice. Please see specific information pulled into the AVS if appropriate.

## 2022-12-05 ENCOUNTER — TELEPHONE (OUTPATIENT)
Dept: FAMILY MEDICINE CLINIC | Facility: CLINIC | Age: 67
End: 2022-12-05

## 2022-12-05 LAB
25(OH)D3+25(OH)D2 SERPL-MCNC: 45.8 NG/ML (ref 30–100)
ALBUMIN SERPL-MCNC: 4.7 G/DL (ref 3.5–5.2)
ALBUMIN/GLOB SERPL: 1.8 G/DL
ALP SERPL-CCNC: 60 U/L (ref 39–117)
ALT SERPL-CCNC: 19 U/L (ref 1–33)
AST SERPL-CCNC: 18 U/L (ref 1–32)
BASOPHILS # BLD AUTO: 0.03 10*3/MM3 (ref 0–0.2)
BASOPHILS NFR BLD AUTO: 0.5 % (ref 0–1.5)
BILIRUB SERPL-MCNC: 0.7 MG/DL (ref 0–1.2)
BUN SERPL-MCNC: 19 MG/DL (ref 8–23)
BUN/CREAT SERPL: 20.9 (ref 7–25)
CALCIUM SERPL-MCNC: 9.5 MG/DL (ref 8.6–10.5)
CHLORIDE SERPL-SCNC: 101 MMOL/L (ref 98–107)
CHOLEST SERPL-MCNC: 182 MG/DL (ref 0–200)
CO2 SERPL-SCNC: 29.8 MMOL/L (ref 22–29)
CREAT SERPL-MCNC: 0.91 MG/DL (ref 0.57–1)
EGFRCR SERPLBLD CKD-EPI 2021: 69.3 ML/MIN/1.73
EOSINOPHIL # BLD AUTO: 0.24 10*3/MM3 (ref 0–0.4)
EOSINOPHIL NFR BLD AUTO: 3.7 % (ref 0.3–6.2)
ERYTHROCYTE [DISTWIDTH] IN BLOOD BY AUTOMATED COUNT: 13.1 % (ref 12.3–15.4)
GLOBULIN SER CALC-MCNC: 2.6 GM/DL
GLUCOSE SERPL-MCNC: 92 MG/DL (ref 65–99)
HBA1C MFR BLD: 6 % (ref 4.8–5.6)
HCT VFR BLD AUTO: 46.2 % (ref 34–46.6)
HDLC SERPL-MCNC: 75 MG/DL (ref 40–60)
HGB BLD-MCNC: 15.3 G/DL (ref 12–15.9)
IMM GRANULOCYTES # BLD AUTO: 0.01 10*3/MM3 (ref 0–0.05)
IMM GRANULOCYTES NFR BLD AUTO: 0.2 % (ref 0–0.5)
LDLC SERPL CALC-MCNC: 96 MG/DL (ref 0–100)
LDLC/HDLC SERPL: 1.27 {RATIO}
LYMPHOCYTES # BLD AUTO: 2.31 10*3/MM3 (ref 0.7–3.1)
LYMPHOCYTES NFR BLD AUTO: 35.3 % (ref 19.6–45.3)
MCH RBC QN AUTO: 29.1 PG (ref 26.6–33)
MCHC RBC AUTO-ENTMCNC: 33.1 G/DL (ref 31.5–35.7)
MCV RBC AUTO: 87.8 FL (ref 79–97)
MONOCYTES # BLD AUTO: 0.59 10*3/MM3 (ref 0.1–0.9)
MONOCYTES NFR BLD AUTO: 9 % (ref 5–12)
NEUTROPHILS # BLD AUTO: 3.36 10*3/MM3 (ref 1.7–7)
NEUTROPHILS NFR BLD AUTO: 51.3 % (ref 42.7–76)
NRBC BLD AUTO-RTO: 0 /100 WBC (ref 0–0.2)
PLATELET # BLD AUTO: 216 10*3/MM3 (ref 140–450)
POTASSIUM SERPL-SCNC: 4.3 MMOL/L (ref 3.5–5.2)
PROT SERPL-MCNC: 7.3 G/DL (ref 6–8.5)
RBC # BLD AUTO: 5.26 10*6/MM3 (ref 3.77–5.28)
SODIUM SERPL-SCNC: 142 MMOL/L (ref 136–145)
T4 FREE SERPL-MCNC: 0.91 NG/DL (ref 0.93–1.7)
TRIGL SERPL-MCNC: 59 MG/DL (ref 0–150)
TSH SERPL DL<=0.005 MIU/L-ACNC: 1.22 UIU/ML (ref 0.27–4.2)
VLDLC SERPL CALC-MCNC: 11 MG/DL (ref 5–40)
WBC # BLD AUTO: 6.54 10*3/MM3 (ref 3.4–10.8)

## 2022-12-05 NOTE — TELEPHONE ENCOUNTER
Caller: Emerita Matthews    Relationship: Self    Best call back number: [phone]    What is the best time to reach you: ANYTIME    Who are you requesting to speak with (clinical staff, provider,  specific staff member): CLINICAL    What was the call regarding: PATIENT STATES SHE IS WANTING TO KNOW WHAT SHE NEEDS TO DO SINCE SHE HAS BEEN EXPOSED TO THE FLU AND IS NOW EXPERIENCING COUGH AND A TEMPERATURE .    PLEASE CALL AND ADVISE.

## 2022-12-06 ENCOUNTER — OFFICE VISIT (OUTPATIENT)
Dept: FAMILY MEDICINE CLINIC | Facility: CLINIC | Age: 67
End: 2022-12-06

## 2022-12-06 VITALS
DIASTOLIC BLOOD PRESSURE: 70 MMHG | RESPIRATION RATE: 18 BRPM | HEIGHT: 64 IN | BODY MASS INDEX: 37.73 KG/M2 | SYSTOLIC BLOOD PRESSURE: 108 MMHG | HEART RATE: 67 BPM | WEIGHT: 221 LBS | TEMPERATURE: 98 F | OXYGEN SATURATION: 95 %

## 2022-12-06 DIAGNOSIS — R09.81 NASAL CONGESTION: Primary | ICD-10-CM

## 2022-12-06 DIAGNOSIS — J11.1 INFLUENZA: ICD-10-CM

## 2022-12-06 LAB
EXPIRATION DATE: ABNORMAL
FLUAV AG UPPER RESP QL IA.RAPID: DETECTED
FLUBV AG UPPER RESP QL IA.RAPID: NOT DETECTED
INTERNAL CONTROL: ABNORMAL
Lab: ABNORMAL
SARS-COV-2 AG UPPER RESP QL IA.RAPID: NOT DETECTED

## 2022-12-06 PROCEDURE — 99213 OFFICE O/P EST LOW 20 MIN: CPT | Performed by: FAMILY MEDICINE

## 2022-12-06 PROCEDURE — 87428 SARSCOV & INF VIR A&B AG IA: CPT | Performed by: FAMILY MEDICINE

## 2022-12-06 RX ORDER — OSELTAMIVIR PHOSPHATE 75 MG/1
75 CAPSULE ORAL 2 TIMES DAILY
Qty: 10 CAPSULE | Refills: 0 | Status: SHIPPED | OUTPATIENT
Start: 2022-12-06 | End: 2023-03-22

## 2022-12-06 NOTE — PROGRESS NOTES
"Chief Complaint  Fever (Pt c/o fever, cough, congestions and fatigue x 3 days)    Subjective        Emerita Matthews presents to [facility]  History of Present Illness     2 days of illness.  Flu symptoms.  Exposed to influenza.  Cough.  No shortness of breath.  Some fever symptoms.    Objective   Vital Signs:  /70   Pulse 67   Temp 98 °F (36.7 °C)   Resp 18   Ht 162.6 cm (64\")   Wt 100 kg (221 lb)   SpO2 95%   BMI 37.93 kg/m²   Estimated body mass index is 37.93 kg/m² as calculated from the following:    Height as of this encounter: 162.6 cm (64\").    Weight as of this encounter: 100 kg (221 lb).          Physical Exam  Constitutional:       Comments: She looks tired but nontoxic   Cardiovascular:      Rate and Rhythm: Normal rate.   Pulmonary:      Effort: Pulmonary effort is normal. No respiratory distress.      Breath sounds: Normal breath sounds. No wheezing or rales.        Result Review :                Assessment and Plan   Diagnoses and all orders for this visit:    1. Nasal congestion (Primary)  -     POCT SARS-CoV-2 Antigen KAIA    2. Influenza    Other orders  -     oseltamivir (Tamiflu) 75 MG capsule; Take 1 capsule by mouth 2 (Two) Times a Day.  Dispense: 10 capsule; Refill: 0    Influenza A.  Test is positive today.  Negative COVID test.  She is mildly to moderately ill.  She has moderate to high risk factors.  I am recommending Tamiflu twice a day for 5 days.  With severe symptoms such as shortness of breath she will seek medical attention.         Follow Up   No follow-ups on file.  Patient was given instructions and counseling regarding her condition or for health maintenance advice. Please see specific information pulled into the AVS if appropriate.       "

## 2023-02-13 RX ORDER — METFORMIN HYDROCHLORIDE 500 MG/1
TABLET, EXTENDED RELEASE ORAL
Qty: 90 TABLET | Refills: 1 | Status: SHIPPED | OUTPATIENT
Start: 2023-02-13

## 2023-02-13 RX ORDER — HYDROCHLOROTHIAZIDE 12.5 MG/1
TABLET ORAL
Qty: 90 TABLET | Refills: 1 | Status: SHIPPED | OUTPATIENT
Start: 2023-02-13 | End: 2023-03-20 | Stop reason: SDUPTHER

## 2023-02-13 NOTE — TELEPHONE ENCOUNTER
Rx Refill Note  Requested Prescriptions     Pending Prescriptions Disp Refills   • metFORMIN ER (GLUCOPHAGE-XR) 500 MG 24 hr tablet [Pharmacy Med Name: METFORMIN HYDROCHLORIDE  MG Tablet Extended Release 24 Hour] 90 tablet 1     Sig: TAKE 1 TABLET EVERY DAY WITH BREAKFAST FOR PREDIABETES   • hydroCHLOROthiazide (HYDRODIURIL) 12.5 MG tablet [Pharmacy Med Name: HYDROCHLOROTHIAZIDE 12.5 MG Tablet] 90 tablet 1     Sig: TAKE 1 TABLET EVERY DAY      Last office visit with prescribing clinician: 12/2/2022   Last telemedicine visit with prescribing clinician: 6/7/2023   Next office visit with prescribing clinician: 6/7/2023       {TIP  Please add Last Relevant Lab Date if appropriate: 12/2/22                 Would you like a call back once the refill request has been completed: [] Yes [] No    If the office needs to give you a call back, can they leave a voicemail: [] Yes [] No    Shonda Garland MA  02/13/23, 11:09 EST

## 2023-03-20 RX ORDER — HYDROCHLOROTHIAZIDE 12.5 MG/1
12.5 TABLET ORAL DAILY
Qty: 14 TABLET | Refills: 0 | Status: SHIPPED | OUTPATIENT
Start: 2023-03-20

## 2023-03-20 NOTE — TELEPHONE ENCOUNTER
Caller: MatthewsEmerita    Relationship: Self    Best call back number: [phone]    Requested Prescriptions:   Requested Prescriptions     Pending Prescriptions Disp Refills   • hydroCHLOROthiazide (HYDRODIURIL) 12.5 MG tablet 90 tablet 1     Sig: Take 1 tablet by mouth Daily.        Pharmacy where request should be sent: [facility] [id number] [facility] [address] [place] [address] RD AT [facility] [phone]  - [phone]      Additional details provided by patient: PATIENT IS OUT OF THIS MEDICATION     Does the patient have less than a 3 day supply:  [x] Yes  [] No    Would you like a call back once the refill request has been completed: [x] Yes [] No    If the office needs to give you a call back, can they leave a voicemail: [x] Yes [] No    Garcia Caceres Rep   03/20/23 12:43 EDT

## 2023-03-20 NOTE — TELEPHONE ENCOUNTER
Rx Refill Note  Requested Prescriptions     Pending Prescriptions Disp Refills   • hydroCHLOROthiazide (HYDRODIURIL) 12.5 MG tablet 90 tablet 1     Sig: Take 1 tablet by mouth Daily.      Last office visit with prescribing clinician: 12/2/2022   Last telemedicine visit with prescribing clinician: 6/7/2023   Next office visit with prescribing clinician: 6/7/2023                         Would you like a call back once the refill request has been completed: [] Yes [] No    If the office needs to give you a call back, can they leave a voicemail: [] Yes [] No    Shonda Garland MA  03/20/23, 15:01 EDT

## 2023-03-20 NOTE — TELEPHONE ENCOUNTER
Rx Refill Note  Requested Prescriptions     Pending Prescriptions Disp Refills   • loratadine (CLARITIN) 10 MG tablet [Pharmacy Med Name: LORATADINE 10 MG Tablet] 90 tablet 0     Sig: TAKE 1 TABLET EVERY DAY      Last office visit with prescribing clinician: 12/2/2022   Last telemedicine visit with prescribing clinician: 3/20/2023   Next office visit with prescribing clinician: 3/20/2023                         Would you like a call back once the refill request has been completed: [] Yes [] No    If the office needs to give you a call back, can they leave a voicemail: [] Yes [] No    Shonda Garland MA  03/20/23, 15:37 EDT

## 2023-03-21 RX ORDER — LORATADINE 10 MG/1
TABLET ORAL
Qty: 90 TABLET | Refills: 0 | Status: SHIPPED | OUTPATIENT
Start: 2023-03-21

## 2023-03-22 ENCOUNTER — OFFICE VISIT (OUTPATIENT)
Dept: FAMILY MEDICINE CLINIC | Facility: CLINIC | Age: 68
End: 2023-03-22
Payer: MEDICARE

## 2023-03-22 VITALS
TEMPERATURE: 96.2 F | DIASTOLIC BLOOD PRESSURE: 89 MMHG | SYSTOLIC BLOOD PRESSURE: 117 MMHG | OXYGEN SATURATION: 97 % | HEART RATE: 95 BPM

## 2023-03-22 DIAGNOSIS — J40 BRONCHITIS: Primary | ICD-10-CM

## 2023-03-22 LAB
EXPIRATION DATE: NORMAL
FLUAV AG UPPER RESP QL IA.RAPID: NOT DETECTED
FLUBV AG UPPER RESP QL IA.RAPID: NOT DETECTED
INTERNAL CONTROL: NORMAL
Lab: NORMAL
SARS-COV-2 AG UPPER RESP QL IA.RAPID: NOT DETECTED

## 2023-03-22 RX ORDER — HYDROCODONE POLISTIREX AND CHLORPHENIRAMINE POLISTIREX 10; 8 MG/5ML; MG/5ML
5 SUSPENSION, EXTENDED RELEASE ORAL EVERY 12 HOURS PRN
Qty: 75 ML | Refills: 0 | Status: SHIPPED | OUTPATIENT
Start: 2023-03-22 | End: 2023-03-30 | Stop reason: SDUPTHER

## 2023-03-22 RX ORDER — TRIAMCINOLONE ACETONIDE 40 MG/ML
40 INJECTION, SUSPENSION INTRA-ARTICULAR; INTRAMUSCULAR ONCE
Status: COMPLETED | OUTPATIENT
Start: 2023-03-22 | End: 2023-03-22

## 2023-03-22 RX ORDER — METHYLPREDNISOLONE 4 MG/1
TABLET ORAL
Qty: 1 EACH | Refills: 0 | Status: SHIPPED | OUTPATIENT
Start: 2023-03-22 | End: 2023-03-30

## 2023-03-22 RX ADMIN — TRIAMCINOLONE ACETONIDE 40 MG: 40 INJECTION, SUSPENSION INTRA-ARTICULAR; INTRAMUSCULAR at 16:00

## 2023-03-22 NOTE — PROGRESS NOTES
"Chief Complaint  Cough (Since Monday , body aches bc of cough), Sore Throat (Since Saturday ), Nasal Congestion (Started Sunday evening ), and Fever (100.5 highest )    Subjective        Emerita Matthews presents to [facility]  History of Present Illness  Emerita Matthews is a 67-year-old female who presents in clinic today for a cough. She is a patient of Dr. Alex.    The patient states that she has had a continuous cough since Monday, 03/20/2023. She reports that her cough has progressively gotten worse. Mrs. Matthews states that she has self medicated with promethazine-DM that she was prescribed in 04/2022. has had no improvement.   Tmax 100.5 yesterday and day prior.    She had a little bit of achiness in her neck and shoulders last night but that resolved.    Symptoms started with a sore throat on 318 which has resolved.    Smokes marijuana intermittently at nighttime, no tobacco, no COPD or current asthma.  She was diagnosed with asthma when younger and has never taken anything for it.  Usually Claritin controls allergy type symptoms.    She has no abdominal symptoms.     Mrs. Matthews reports that she does well with steroids and would like to get a steroid injection today and would like to feel better by 03/25/2023.     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 She states that ever since she has been on Claritin daily, when she gets a cough she is usually sick. She reports that she has not used an albuterol inhaler in a long time. She has Symbicort, but she is not sure how old it is.      The patient tested negative for the Covid-19 and influenza.    Objective   Vital Signs:  /89   Pulse 95   Temp 96.2 °F (35.7 °C) (Temporal)   SpO2 97%   Estimated body mass index is 37.93 kg/m² as calculated from the following:    Height as of 12/6/22: 162.6 cm (64\").    Weight as of 12/6/22: 100 kg (221 lb).   "           Physical Exam  Vitals and nursing note reviewed.   Constitutional:       General: She is not in acute distress.     Appearance: She is well-developed. She is not ill-appearing or diaphoretic.   HENT:      Head: Normocephalic and atraumatic.      Right Ear: Ear canal and external ear normal.      Left Ear: Ear canal and external ear normal.      Ears:      Comments: Bilateral TMs dull     Mouth/Throat:      Mouth: Mucous membranes are moist.      Pharynx: Posterior oropharyngeal erythema (Positive PND) present.   Eyes:      General: No scleral icterus.        Right eye: No discharge.         Left eye: No discharge.      Conjunctiva/sclera: Conjunctivae normal.   Cardiovascular:      Rate and Rhythm: Normal rate and regular rhythm.   Pulmonary:      Effort: Pulmonary effort is normal.      Breath sounds: Normal breath sounds. No wheezing, rhonchi or rales.      Comments: Frequent bronchospastic cough  Abdominal:      General: Bowel sounds are normal.      Palpations: Abdomen is soft.      Tenderness: There is no abdominal tenderness.   Musculoskeletal:         General: No deformity.      Cervical back: Neck supple.      Comments: Gait smooth and steady   Lymphadenopathy:      Cervical: No cervical adenopathy.   Skin:     General: Skin is warm and dry.   Neurological:      Mental Status: She is alert and oriented to person, place, and time.   Psychiatric:         Mood and Affect: Mood normal.         Behavior: Behavior normal.        Result Review :                   Assessment and Plan   Diagnoses and all orders for this visit:    1. Bronchitis (Primary)  -     Hydrocod Vlad-Chlorphe Vlad ER (Tussionex Pennkinetic ER) 10-8 MG/5ML ER suspension; Take 5 mL by mouth Every 12 (Twelve) Hours As Needed for Cough.  Dispense: 75 mL; Refill: 0  -     methylPREDNISolone (MEDROL) 4 MG dose pack; Take as directed on package instructions.  Dispense: 1 each; Refill: 0  -     triamcinolone acetonide (KENALOG-40) injection  40 mg  -     POCT SARS-CoV-2 Antigen KAIA        Bronchitis (Primary)  triamcinolone acetonide (KENALOG-40) injection 40 mg  methylprednisolone (MEDROL DOSEPACK) 4 mg dose pack; Take as directed on package instructions. Dispense: 21 tablet; Refill: 0  Hydrocod Vlad- ER (Tussionex Pennkinetic ER) 10-8MG/5ML; Take 5mL by mouth every 12(Twelve) Hours as needed for cough.         Follow Up   No follow-ups on file.  Patient was given instructions and counseling regarding her condition or for health maintenance advice. Please see specific information pulled into the AVS if appropriate.        Transcribed from ambient dictation for EDILBERTO Medina by Lizbeth Callahan.  03/22/23   18:04 EDT    Patient or patient representative verbalized consent to the visit recording.  I have personally performed the services described in this document as transcribed by the above individual, and it is both accurate and complete.

## 2023-03-30 ENCOUNTER — OFFICE VISIT (OUTPATIENT)
Dept: FAMILY MEDICINE CLINIC | Facility: CLINIC | Age: 68
End: 2023-03-30
Payer: MEDICARE

## 2023-03-30 VITALS
DIASTOLIC BLOOD PRESSURE: 90 MMHG | HEART RATE: 80 BPM | TEMPERATURE: 96.6 F | WEIGHT: 219 LBS | SYSTOLIC BLOOD PRESSURE: 121 MMHG | OXYGEN SATURATION: 97 % | HEIGHT: 64 IN | BODY MASS INDEX: 37.39 KG/M2

## 2023-03-30 DIAGNOSIS — J40 BRONCHITIS: ICD-10-CM

## 2023-03-30 PROCEDURE — 1160F RVW MEDS BY RX/DR IN RCRD: CPT | Performed by: NURSE PRACTITIONER

## 2023-03-30 PROCEDURE — 1159F MED LIST DOCD IN RCRD: CPT | Performed by: NURSE PRACTITIONER

## 2023-03-30 PROCEDURE — 99213 OFFICE O/P EST LOW 20 MIN: CPT | Performed by: NURSE PRACTITIONER

## 2023-03-30 RX ORDER — LANOLIN ALCOHOL/MO/W.PET/CERES
1 CREAM (GRAM) TOPICAL DAILY
COMMUNITY

## 2023-03-30 RX ORDER — AZITHROMYCIN 250 MG/1
TABLET, FILM COATED ORAL
Qty: 6 TABLET | Refills: 0 | Status: SHIPPED | OUTPATIENT
Start: 2023-03-30

## 2023-03-30 RX ORDER — MOMETASONE FUROATE 100 UG/1
1-2 AEROSOL RESPIRATORY (INHALATION) 2 TIMES DAILY PRN
Qty: 1 EACH | Refills: 0
Start: 2023-03-30

## 2023-03-30 RX ORDER — HYDROCODONE POLISTIREX AND CHLORPHENIRAMINE POLISTIREX 10; 8 MG/5ML; MG/5ML
5 SUSPENSION, EXTENDED RELEASE ORAL EVERY 12 HOURS PRN
Qty: 75 ML | Refills: 0 | Status: SHIPPED | OUTPATIENT
Start: 2023-03-30

## 2023-03-30 NOTE — PROGRESS NOTES
"Chief Complaint  Cough (Continued cough and chest congestion )    Subjective        Emerita Matthews presents to [facility]  History of Present Illness patient returns today for continued cough and congestion.  Felt a little better on the steroids which were completed this past Tuesday.  Cough had decreased both in frequency and sputum production.  Mostly cloudy sputum now.  Felt better yesterday but then woke up in the middle of the night with continued cough and not feeling as well today.  No fever or chills.  No sore throat or ear pain.  The cough medicine was helpful but she is almost out of it.  She has continued Claritin for allergies.    Objective   Vital Signs:  /90   Pulse 80   Temp 96.6 °F (35.9 °C) (Temporal)   Ht 162.6 cm (64\")   Wt 99.3 kg (219 lb)   SpO2 97%   BMI 37.59 kg/m²   Estimated body mass index is 37.59 kg/m² as calculated from the following:    Height as of this encounter: 162.6 cm (64\").    Weight as of this encounter: 99.3 kg (219 lb).             Physical Exam  Vitals and nursing note reviewed.   Constitutional:       General: She is not in acute distress.     Appearance: She is well-developed. She is not ill-appearing or diaphoretic.   HENT:      Head: Normocephalic and atraumatic.      Right Ear: Tympanic membrane, ear canal and external ear normal.      Left Ear: Ear canal and external ear normal.      Ears:      Comments: Left TM remains cloudy     Mouth/Throat:      Mouth: Mucous membranes are moist.      Pharynx: No posterior oropharyngeal erythema.   Eyes:      General: No scleral icterus.        Right eye: No discharge.         Left eye: No discharge.      Conjunctiva/sclera: Conjunctivae normal.   Cardiovascular:      Rate and Rhythm: Normal rate and regular rhythm.      Heart sounds: Normal heart sounds.   Pulmonary:      Effort: Pulmonary effort is normal.      Breath sounds: Normal breath sounds. No wheezing, rhonchi or rales.   Abdominal: "      General: Bowel sounds are normal.      Palpations: Abdomen is soft.      Tenderness: There is no abdominal tenderness.   Musculoskeletal:         General: No deformity.      Cervical back: Neck supple.      Comments: Gait smooth and steady   Lymphadenopathy:      Cervical: No cervical adenopathy.   Skin:     General: Skin is warm and dry.   Neurological:      Mental Status: She is alert and oriented to person, place, and time.   Psychiatric:         Mood and Affect: Mood normal.         Behavior: Behavior normal.        Result Review :                   Assessment and Plan   Diagnoses and all orders for this visit:    1. Bronchitis  -     Hydrocod Vlad-Chlorphe Vlad ER (Tussionex Pennkinetic ER) 10-8 MG/5ML ER suspension; Take 5 mL by mouth Every 12 (Twelve) Hours As Needed for Cough.  Dispense: 75 mL; Refill: 0  -     azithromycin (Zithromax Z-Tavares) 250 MG tablet; Take 2 tablets the first day, then 1 tablet daily for 4 days.  Dispense: 6 tablet; Refill: 0  -     Mometasone Furoate (Asmanex HFA) 100 MCG/ACT aerosol; Inhale 1-2 puffs 2 (Two) Times a Day As Needed (cough).  Dispense: 1 each; Refill: 0    Discussed with patient that it is unusual for bronchitis to last for 4 to 6 weeks.  She does seem to be improving from visit on 3/22/2023.  Will refill cough medicine.  I do not think she needs additional systemic steroids.  We will give an Asmanex inhaler sample to use twice per day as needed to see if this is helpful with cough.  Cautions to make sure she rinses her mouth really well after to avoid thrush.  I have refilled the hydrocodone cough medicine since that has been helpful especially at night.  I will give her a Z-Tavares to have on hand if worsening over the weekend but I do not think she should use it and I have cautioned her against antibiotic overuse.  But I do not want her to require urgent care over the weekend either.  Return to clinic if not improving as expected.         Follow Up   No follow-ups on  file.  Patient was given instructions and counseling regarding her condition or for health maintenance advice. Please see specific information pulled into the AVS if appropriate.

## 2023-05-02 RX ORDER — DULOXETIN HYDROCHLORIDE 30 MG/1
CAPSULE, DELAYED RELEASE ORAL
Qty: 90 CAPSULE | Refills: 1 | Status: SHIPPED | OUTPATIENT
Start: 2023-05-02

## 2023-06-07 ENCOUNTER — OFFICE VISIT (OUTPATIENT)
Dept: FAMILY MEDICINE CLINIC | Facility: CLINIC | Age: 68
End: 2023-06-07
Payer: COMMERCIAL

## 2023-06-07 VITALS
TEMPERATURE: 95.5 F | WEIGHT: 214.2 LBS | HEART RATE: 87 BPM | HEIGHT: 64 IN | RESPIRATION RATE: 18 BRPM | BODY MASS INDEX: 36.57 KG/M2 | OXYGEN SATURATION: 97 % | DIASTOLIC BLOOD PRESSURE: 78 MMHG | SYSTOLIC BLOOD PRESSURE: 128 MMHG

## 2023-06-07 DIAGNOSIS — I10 ESSENTIAL HYPERTENSION: ICD-10-CM

## 2023-06-07 DIAGNOSIS — R73.03 PREDIABETES: ICD-10-CM

## 2023-06-07 DIAGNOSIS — E55.9 VITAMIN D DEFICIENCY: Primary | ICD-10-CM

## 2023-06-07 DIAGNOSIS — E78.2 MIXED HYPERLIPIDEMIA: ICD-10-CM

## 2023-06-07 PROCEDURE — 99214 OFFICE O/P EST MOD 30 MIN: CPT | Performed by: INTERNAL MEDICINE

## 2023-06-07 RX ORDER — HYDROCHLOROTHIAZIDE 12.5 MG/1
12.5 TABLET ORAL DAILY
Qty: 90 TABLET | Refills: 1 | Status: SHIPPED | OUTPATIENT
Start: 2023-06-07

## 2023-06-07 RX ORDER — ATORVASTATIN CALCIUM 10 MG/1
10 TABLET, FILM COATED ORAL DAILY
Qty: 90 TABLET | Refills: 3 | Status: SHIPPED | OUTPATIENT
Start: 2023-06-07

## 2023-06-07 NOTE — PROGRESS NOTES
"Chief Complaint  Vitamin D Deficiency    Subjective        Emerita Matthews presents to [facility]  History of Present Illness  Here for f/u on VDD, HL, HTN and prediabetes.  She takes 1000 IUs D3 over-the-counter daily.  Tolerating atorvastatin 10 mg daily for hyperlipidemia.  She is fasting for blood work today.  Blood pressure controlled on HCTZ 12.5 mg daily.  Prediabetes did with metformin extended release 500 mg daily.  Anxiety and depression controlled with 30 mg duloxetine.  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  Works through [employer].  Doing very well.  Enjoying her business.  MMG is up-to-date.  Objective   Vital Signs:  /78 (BP Location: Left arm, Patient Position: Sitting, Cuff Size: Adult)   Pulse 87   Temp 95.5 °F (35.3 °C) (Temporal)   Resp 18   Ht 162.6 cm (64.02\")   Wt 97.2 kg (214 lb 3.2 oz)   SpO2 97%   BMI 36.75 kg/m²   Estimated body mass index is 36.75 kg/m² as calculated from the following:    Height as of this encounter: 162.6 cm (64.02\").    Weight as of this encounter: 97.2 kg (214 lb 3.2 oz).       [unfilled]    Physical Exam  Vitals and nursing note reviewed.   Constitutional:       Appearance: Normal appearance. She is well-developed.   HENT:      Head: Normocephalic and atraumatic.      Right Ear: External ear normal.      Left Ear: External ear normal.   Eyes:      Extraocular Movements: Extraocular movements intact.      Conjunctiva/sclera: Conjunctivae normal.   Neck:      Vascular: No carotid bruit.   Cardiovascular:      Rate and Rhythm: Normal rate and regular rhythm.      Heart sounds: Normal heart sounds.      Comments: No bruits  Pulmonary:      Effort: Pulmonary effort is normal. No respiratory distress.      Breath sounds: Normal breath sounds. No stridor. No wheezing, rhonchi or rales.   Chest:      Chest wall: No tenderness.   Abdominal:      General: Bowel sounds " are normal. There is no distension.      Palpations: Abdomen is soft. There is no mass.      Tenderness: There is no abdominal tenderness. There is no guarding or rebound.      Hernia: No hernia is present.   Musculoskeletal:      Cervical back: Neck supple.   Lymphadenopathy:      Cervical: No cervical adenopathy.   Skin:     General: Skin is warm.   Neurological:      Mental Status: She is alert and oriented to person, place, and time. Mental status is at baseline.   Psychiatric:         Mood and Affect: Mood normal.         Behavior: Behavior normal.         Thought Content: Thought content normal.         Judgment: Judgment normal.      Result Review :                   Assessment and Plan   Diagnoses and all orders for this visit:    1. Vitamin D deficiency (Primary)  -     Vitamin D,25-Hydroxy    2. Mixed hyperlipidemia  -     Comprehensive Metabolic Panel  -     Lipid Panel With LDL / HDL Ratio    3. Prediabetes  -     Hemoglobin A1c    4. Essential hypertension  -     Hemoglobin A1c  -     Comprehensive Metabolic Panel  -     Lipid Panel With LDL / HDL Ratio  -     TSH  -     T4, Free    Other orders  -     hydroCHLOROthiazide (HYDRODIURIL) 12.5 MG tablet; Take 1 tablet by mouth Daily.  Dispense: 90 tablet; Refill: 1  -     atorvastatin (Lipitor) 10 MG tablet; Take 1 tablet by mouth Daily. To decrease risk of stroke and heart attack  Dispense: 90 tablet; Refill: 3    Fasting labs ordered.  We will check A1c.  Continue metformin for prediabetes  Continue Lipitor for a hyperlipidemia.  Check CMP and fasting lipid profile.  Refill provided.  Hypertension controlled with HCTZ.  Refill provided.  Check potassium and kidney function  Anxiety depression controlled with Cymbalta.         Follow Up   No follow-ups on file.  Patient was given instructions and counseling regarding her condition or for health maintenance advice. Please see specific information pulled into the AVS if appropriate.       Answers submitted  by the patient for this visit:  Other (Submitted on 5/31/2023)  Please describe your symptoms.: None  Have you had these symptoms before?: No  How long have you been having these symptoms?: 1-4 days  Primary Reason for Visit (Submitted on 5/31/2023)  What is the primary reason for your visit?: Other

## 2023-06-08 LAB
25(OH)D3+25(OH)D2 SERPL-MCNC: 51.6 NG/ML (ref 30–100)
ALBUMIN SERPL-MCNC: 4.5 G/DL (ref 3.8–4.8)
ALBUMIN/GLOB SERPL: 1.7 {RATIO} (ref 1.2–2.2)
ALP SERPL-CCNC: 48 IU/L (ref 44–121)
ALT SERPL-CCNC: 19 IU/L (ref 0–32)
AST SERPL-CCNC: 15 IU/L (ref 0–40)
BILIRUB SERPL-MCNC: 0.8 MG/DL (ref 0–1.2)
BUN SERPL-MCNC: 16 MG/DL (ref 8–27)
BUN/CREAT SERPL: 17 (ref 12–28)
CALCIUM SERPL-MCNC: 9.6 MG/DL (ref 8.7–10.3)
CHLORIDE SERPL-SCNC: 101 MMOL/L (ref 96–106)
CHOLEST SERPL-MCNC: 140 MG/DL (ref 100–199)
CO2 SERPL-SCNC: 25 MMOL/L (ref 20–29)
CREAT SERPL-MCNC: 0.94 MG/DL (ref 0.57–1)
EGFRCR SERPLBLD CKD-EPI 2021: 67 ML/MIN/1.73
GLOBULIN SER CALC-MCNC: 2.6 G/DL (ref 1.5–4.5)
GLUCOSE SERPL-MCNC: 98 MG/DL (ref 70–99)
HBA1C MFR BLD: 5.9 % (ref 4.8–5.6)
HDLC SERPL-MCNC: 60 MG/DL
LDLC SERPL CALC-MCNC: 68 MG/DL (ref 0–99)
LDLC/HDLC SERPL: 1.1 RATIO (ref 0–3.2)
POTASSIUM SERPL-SCNC: 3.9 MMOL/L (ref 3.5–5.2)
PROT SERPL-MCNC: 7.1 G/DL (ref 6–8.5)
SODIUM SERPL-SCNC: 141 MMOL/L (ref 134–144)
T4 FREE SERPL-MCNC: 1.08 NG/DL (ref 0.82–1.77)
TRIGL SERPL-MCNC: 56 MG/DL (ref 0–149)
TSH SERPL DL<=0.005 MIU/L-ACNC: 1.6 UIU/ML (ref 0.45–4.5)
VLDLC SERPL CALC-MCNC: 12 MG/DL (ref 5–40)

## 2023-07-26 ENCOUNTER — OFFICE VISIT (OUTPATIENT)
Dept: FAMILY MEDICINE CLINIC | Facility: CLINIC | Age: 68
End: 2023-07-26
Payer: MEDICARE

## 2023-07-26 VITALS
TEMPERATURE: 96.9 F | HEART RATE: 84 BPM | DIASTOLIC BLOOD PRESSURE: 86 MMHG | SYSTOLIC BLOOD PRESSURE: 129 MMHG | BODY MASS INDEX: 37.66 KG/M2 | HEIGHT: 64 IN | OXYGEN SATURATION: 97 % | WEIGHT: 220.6 LBS

## 2023-07-26 DIAGNOSIS — R10.13 EPIGASTRIC PAIN: Primary | ICD-10-CM

## 2023-07-26 RX ORDER — OMEPRAZOLE 20 MG/1
20 CAPSULE, DELAYED RELEASE ORAL DAILY
Qty: 20 CAPSULE | Refills: 0 | Status: SHIPPED | OUTPATIENT
Start: 2023-07-26 | End: 2023-08-15

## 2023-07-26 NOTE — PROGRESS NOTES
"Chief Complaint  Back Pain (UPPER BACK PAIN X 7/23/23), Heartburn ( X 7/23/23), and Abdominal Pain (UPPER ABDOMINAL PAIN  X 7/23/23)    Subjective        Emerita Matthews presents to [facility]  History of Present Illness    Sunday night ate a large meal.  Had epigastric achiness with radiation to the back.  8 out of 10 pain or so.  Causing vomiting x1.  No blood in the vomitus.  Normal-looking vomit she states.  No change in stool.  No blood in the stool.  No dark black stools.  No exertional chest pain or chest pressure.  No shortness of breath.  No associated diaphoresis.  It bothers her a lot Sunday night and got better.  No trouble then for 2 days.  And then last night woke her up.  At 4 AM this morning.  She has had the pain since.  4 out of 10 pain.  States symptoms as above.  But no vomiting.  No change in stool.    No alcohol use.  No NSAID use.  Does not smoke tobacco.  Has one half of a marijuana joint at bedtime consistently.  Postmenopausal not pregnant.  No previous gallbladder surgery.  Still 4 out of 10 pain as we speak.  No exertional chest pain no exertional shortness of breath.  And symptoms are not altered by exertion.  She states she ate a normal meal last night.    Objective   Vital Signs:  /86   Pulse 84   Temp 96.9 °F (36.1 °C) (Temporal)   Ht 162.6 cm (64.02\")   Wt 100 kg (220 lb 9.6 oz)   SpO2 97%   BMI 37.84 kg/m²   Estimated body mass index is 37.84 kg/m² as calculated from the following:    Height as of this encounter: 162.6 cm (64.02\").    Weight as of this encounter: 100 kg (220 lb 9.6 oz).             Physical Exam  Vitals and nursing note reviewed.   Constitutional:       General: She is not in acute distress.     Appearance: She is well-developed.   Cardiovascular:      Rate and Rhythm: Normal rate and regular rhythm.      Heart sounds: Normal heart sounds.   Pulmonary:      Effort: Pulmonary effort is normal.      Breath sounds: Normal breath " sounds.   Abdominal:      General: Bowel sounds are normal. There is no distension.      Palpations: Abdomen is soft. There is no mass.      Tenderness: There is no abdominal tenderness. There is no guarding or rebound.      Hernia: No hernia is present.      Comments: The abdominal exam is unremarkable.  Nontender.  The anterior costochondral margins are unremarkable to exam.  No pain to palpation.  Negative Henderson sign.   Skin:     General: Skin is warm and dry.   Psychiatric:         Mood and Affect: Mood normal.      Result Review :  The following data was reviewed by: Dong Eisenberg MD on 07/26/2023:  Common labs          12/2/2022    12:10 6/7/2023    08:47   Common Labs   Glucose 92  98    BUN 19  16    Creatinine 0.91  0.94    Sodium 142  141    Potassium 4.3  3.9    Chloride 101  101    Calcium 9.5  9.6    Total Protein 7.3  7.1    Albumin 4.70  4.5    Total Bilirubin 0.7  0.8    Alkaline Phosphatase 60  48    AST (SGOT) 18  15    ALT (SGPT) 19  19    WBC 6.54     Hemoglobin 15.3     Hematocrit 46.2     Platelets 216     Total Cholesterol 182  140    Triglycerides 59  56    HDL Cholesterol 75  60    LDL Cholesterol  96  68    Hemoglobin A1C 6.00  5.9              ECG 12 Lead    Date/Time: 7/26/2023 9:04 AM  Performed by: Dong Eisenberg MD  Authorized by: Dong Eisenberg MD   Comparison: compared with previous ECG   Similar to previous ECG  Rhythm: sinus rhythm  Rate: normal  Conduction: conduction normal  ST Segments: ST segments normal  T Waves: T waves normal  QRS axis: normal  Other findings: non-specific ST-T wave changes    Clinical impression: normal ECG  Comments: Essentially normal EKG.  Unchanged compared to last year.  No signs of inferior ischemia.          Assessment and Plan   Diagnoses and all orders for this visit:    1. Epigastric pain (Primary)  -     ECG 12 Lead  -     CBC & Differential  -     Comprehensive Metabolic Panel  -     Lipase    Other orders  -     omeprazole (priLOSEC) 20  MG capsule; Take 1 capsule by mouth Daily for 20 days.  Dispense: 20 capsule; Refill: 0      Epigastric pain.  Cause unknown.  EKG today is unremarkable and unchanged compared to last year.    Differential diagnosis includes atypical presentation of coronary artery disease, gastritis, gastric or duodenal ulcer, biliary colic, pancreatitis.  Symptoms are currently mild and the patient is stable.  And looks comfortable.    I think at this time most likely diagnosis is GI source.  I am recommending omeprazole 20 mg daily.  And above lab work.  And follow-up with primary care provider in the next week or 2.  If not improving I would recommend consideration of gallbladder ultrasound.  If lab work abnormal further work-up may be needed sooner.    Very severe to passing symptoms she is well directly to the emergency room.               Follow Up   No follow-ups on file.  Patient was given instructions and counseling regarding her condition or for health maintenance advice. Please see specific information pulled into the AVS if appropriate.

## 2023-07-26 NOTE — PATIENT INSTRUCTIONS
I think the most likely cause of your abdominal discomfort is gastritis or other GI issue.  Less likely heart disease.  At this time I do not think there is an absolute indication for cardiac work-up.  I am recommending lab work, starting an acid blocker omeprazole 20 mg daily, and I want you to follow-up with your primary care provider within 1 to 2 weeks.  If you have very severe incapacitating symptoms such as chest pain chest pressure trouble breathing blood in the stool blood in the vomit or severe abdominal pain or other severe concerns seek medical attention immediately in the emergency room.

## 2023-07-27 LAB
ALBUMIN SERPL-MCNC: 4.3 G/DL (ref 3.5–5.2)
ALBUMIN/GLOB SERPL: 1.7 G/DL
ALP SERPL-CCNC: 62 U/L (ref 39–117)
ALT SERPL-CCNC: 15 U/L (ref 1–33)
AST SERPL-CCNC: 17 U/L (ref 1–32)
BASOPHILS # BLD AUTO: 0.03 10*3/MM3 (ref 0–0.2)
BASOPHILS NFR BLD AUTO: 0.3 % (ref 0–1.5)
BILIRUB SERPL-MCNC: 0.5 MG/DL (ref 0–1.2)
BUN SERPL-MCNC: 18 MG/DL (ref 8–23)
BUN/CREAT SERPL: 21.2 (ref 7–25)
CALCIUM SERPL-MCNC: 9.8 MG/DL (ref 8.6–10.5)
CHLORIDE SERPL-SCNC: 102 MMOL/L (ref 98–107)
CO2 SERPL-SCNC: 26.6 MMOL/L (ref 22–29)
CREAT SERPL-MCNC: 0.85 MG/DL (ref 0.57–1)
EGFRCR SERPLBLD CKD-EPI 2021: 75.2 ML/MIN/1.73
EOSINOPHIL # BLD AUTO: 0.14 10*3/MM3 (ref 0–0.4)
EOSINOPHIL NFR BLD AUTO: 1.4 % (ref 0.3–6.2)
ERYTHROCYTE [DISTWIDTH] IN BLOOD BY AUTOMATED COUNT: 13.4 % (ref 12.3–15.4)
GLOBULIN SER CALC-MCNC: 2.6 GM/DL
GLUCOSE SERPL-MCNC: 119 MG/DL (ref 65–99)
HCT VFR BLD AUTO: 46.5 % (ref 34–46.6)
HGB BLD-MCNC: 15.6 G/DL (ref 12–15.9)
IMM GRANULOCYTES # BLD AUTO: 0.04 10*3/MM3 (ref 0–0.05)
IMM GRANULOCYTES NFR BLD AUTO: 0.4 % (ref 0–0.5)
LIPASE SERPL-CCNC: 23 U/L (ref 13–60)
LYMPHOCYTES # BLD AUTO: 1.79 10*3/MM3 (ref 0.7–3.1)
LYMPHOCYTES NFR BLD AUTO: 17.9 % (ref 19.6–45.3)
MCH RBC QN AUTO: 29.5 PG (ref 26.6–33)
MCHC RBC AUTO-ENTMCNC: 33.5 G/DL (ref 31.5–35.7)
MCV RBC AUTO: 88.1 FL (ref 79–97)
MONOCYTES # BLD AUTO: 0.61 10*3/MM3 (ref 0.1–0.9)
MONOCYTES NFR BLD AUTO: 6.1 % (ref 5–12)
NEUTROPHILS # BLD AUTO: 7.37 10*3/MM3 (ref 1.7–7)
NEUTROPHILS NFR BLD AUTO: 73.9 % (ref 42.7–76)
NRBC BLD AUTO-RTO: 0 /100 WBC (ref 0–0.2)
PLATELET # BLD AUTO: 244 10*3/MM3 (ref 140–450)
POTASSIUM SERPL-SCNC: 4.3 MMOL/L (ref 3.5–5.2)
PROT SERPL-MCNC: 6.9 G/DL (ref 6–8.5)
RBC # BLD AUTO: 5.28 10*6/MM3 (ref 3.77–5.28)
SODIUM SERPL-SCNC: 142 MMOL/L (ref 136–145)
WBC # BLD AUTO: 9.98 10*3/MM3 (ref 3.4–10.8)

## 2023-07-28 ENCOUNTER — ANESTHESIA EVENT (OUTPATIENT)
Dept: PERIOP | Facility: HOSPITAL | Age: 68
End: 2023-07-28
Payer: MEDICARE

## 2023-07-28 ENCOUNTER — HOSPITAL ENCOUNTER (OUTPATIENT)
Facility: HOSPITAL | Age: 68
Setting detail: OBSERVATION
Discharge: HOME | End: 2023-07-29
Attending: EMERGENCY MEDICINE | Admitting: INTERNAL MEDICINE
Payer: MEDICARE

## 2023-07-28 ENCOUNTER — APPOINTMENT (OUTPATIENT)
Dept: GENERAL RADIOLOGY | Facility: HOSPITAL | Age: 68
End: 2023-07-28
Payer: MEDICARE

## 2023-07-28 ENCOUNTER — ANESTHESIA (OUTPATIENT)
Dept: PERIOP | Facility: HOSPITAL | Age: 68
End: 2023-07-28
Payer: MEDICARE

## 2023-07-28 ENCOUNTER — APPOINTMENT (OUTPATIENT)
Dept: CT IMAGING | Facility: HOSPITAL | Age: 68
End: 2023-07-28
Payer: MEDICARE

## 2023-07-28 DIAGNOSIS — R73.9 HYPERGLYCEMIA: ICD-10-CM

## 2023-07-28 DIAGNOSIS — J40 BRONCHITIS: ICD-10-CM

## 2023-07-28 DIAGNOSIS — K80.00 ACUTE CALCULOUS CHOLECYSTITIS: Primary | ICD-10-CM

## 2023-07-28 PROBLEM — K81.0 ACUTE CHOLECYSTITIS: Status: ACTIVE | Noted: 2023-07-28

## 2023-07-28 LAB
ALBUMIN SERPL-MCNC: 4.8 G/DL (ref 3.5–5.2)
ALBUMIN/GLOB SERPL: 1.7 G/DL
ALP SERPL-CCNC: 66 U/L (ref 39–117)
ALT SERPL W P-5'-P-CCNC: 16 U/L (ref 1–33)
ANION GAP SERPL CALCULATED.3IONS-SCNC: 12 MMOL/L (ref 5–15)
AST SERPL-CCNC: 15 U/L (ref 1–32)
BACTERIA UR QL AUTO: ABNORMAL /HPF
BASOPHILS # BLD AUTO: 0.02 10*3/MM3 (ref 0–0.2)
BASOPHILS NFR BLD AUTO: 0.3 % (ref 0–1.5)
BILIRUB SERPL-MCNC: 0.6 MG/DL (ref 0–1.2)
BILIRUB UR QL STRIP: NEGATIVE
BUN SERPL-MCNC: 18 MG/DL (ref 8–23)
BUN/CREAT SERPL: 19.8 (ref 7–25)
CALCIUM SPEC-SCNC: 9.9 MG/DL (ref 8.6–10.5)
CHLORIDE SERPL-SCNC: 103 MMOL/L (ref 98–107)
CLARITY UR: CLEAR
CO2 SERPL-SCNC: 28 MMOL/L (ref 22–29)
COLOR UR: YELLOW
CREAT SERPL-MCNC: 0.91 MG/DL (ref 0.57–1)
DEPRECATED RDW RBC AUTO: 41.2 FL (ref 37–54)
EGFRCR SERPLBLD CKD-EPI 2021: 69.3 ML/MIN/1.73
EOSINOPHIL # BLD AUTO: 0.23 10*3/MM3 (ref 0–0.4)
EOSINOPHIL NFR BLD AUTO: 3 % (ref 0.3–6.2)
ERYTHROCYTE [DISTWIDTH] IN BLOOD BY AUTOMATED COUNT: 13 % (ref 12.3–15.4)
GEN 5 2HR TROPONIN T REFLEX: 7 NG/L
GLOBULIN UR ELPH-MCNC: 2.8 GM/DL
GLUCOSE SERPL-MCNC: 168 MG/DL (ref 65–99)
GLUCOSE UR STRIP-MCNC: NEGATIVE MG/DL
HCT VFR BLD AUTO: 46.1 % (ref 34–46.6)
HGB BLD-MCNC: 15.4 G/DL (ref 12–15.9)
HGB UR QL STRIP.AUTO: NEGATIVE
HYALINE CASTS UR QL AUTO: ABNORMAL /LPF
IMM GRANULOCYTES # BLD AUTO: 0.01 10*3/MM3 (ref 0–0.05)
IMM GRANULOCYTES NFR BLD AUTO: 0.1 % (ref 0–0.5)
KETONES UR QL STRIP: NEGATIVE
LEUKOCYTE ESTERASE UR QL STRIP.AUTO: NEGATIVE
LIPASE SERPL-CCNC: 28 U/L (ref 13–60)
LYMPHOCYTES # BLD AUTO: 1.68 10*3/MM3 (ref 0.7–3.1)
LYMPHOCYTES NFR BLD AUTO: 21.9 % (ref 19.6–45.3)
MCH RBC QN AUTO: 29.1 PG (ref 26.6–33)
MCHC RBC AUTO-ENTMCNC: 33.4 G/DL (ref 31.5–35.7)
MCV RBC AUTO: 87 FL (ref 79–97)
MONOCYTES # BLD AUTO: 0.54 10*3/MM3 (ref 0.1–0.9)
MONOCYTES NFR BLD AUTO: 7 % (ref 5–12)
NEUTROPHILS NFR BLD AUTO: 5.18 10*3/MM3 (ref 1.7–7)
NEUTROPHILS NFR BLD AUTO: 67.7 % (ref 42.7–76)
NITRITE UR QL STRIP: POSITIVE
NRBC BLD AUTO-RTO: 0 /100 WBC (ref 0–0.2)
PH UR STRIP.AUTO: 7.5 [PH] (ref 5–8)
PLATELET # BLD AUTO: 235 10*3/MM3 (ref 140–450)
PMV BLD AUTO: 9.4 FL (ref 6–12)
POTASSIUM SERPL-SCNC: 3.6 MMOL/L (ref 3.5–5.2)
PROT SERPL-MCNC: 7.6 G/DL (ref 6–8.5)
PROT UR QL STRIP: NEGATIVE
QT INTERVAL: 458 MS
RBC # BLD AUTO: 5.3 10*6/MM3 (ref 3.77–5.28)
RBC # UR STRIP: ABNORMAL /HPF
REF LAB TEST METHOD: ABNORMAL
SODIUM SERPL-SCNC: 143 MMOL/L (ref 136–145)
SP GR UR STRIP: >=1.03 (ref 1–1.03)
SQUAMOUS #/AREA URNS HPF: ABNORMAL /HPF
TROPONIN T DELTA: NORMAL
TROPONIN T SERPL HS-MCNC: <6 NG/L
UROBILINOGEN UR QL STRIP: ABNORMAL
WBC # UR STRIP: ABNORMAL /HPF
WBC NRBC COR # BLD: 7.66 10*3/MM3 (ref 3.4–10.8)

## 2023-07-28 PROCEDURE — 74177 CT ABD & PELVIS W/CONTRAST: CPT

## 2023-07-28 PROCEDURE — 25010000002 PROPOFOL 10 MG/ML EMULSION: Performed by: ANESTHESIOLOGY

## 2023-07-28 PROCEDURE — 80053 COMPREHEN METABOLIC PANEL: CPT | Performed by: EMERGENCY MEDICINE

## 2023-07-28 PROCEDURE — 93005 ELECTROCARDIOGRAM TRACING: CPT | Performed by: EMERGENCY MEDICINE

## 2023-07-28 PROCEDURE — 99285 EMERGENCY DEPT VISIT HI MDM: CPT

## 2023-07-28 PROCEDURE — 84484 ASSAY OF TROPONIN QUANT: CPT | Performed by: EMERGENCY MEDICINE

## 2023-07-28 PROCEDURE — 88304 TISSUE EXAM BY PATHOLOGIST: CPT | Performed by: SURGERY

## 2023-07-28 PROCEDURE — 25010000002 SUGAMMADEX 200 MG/2ML SOLUTION: Performed by: NURSE ANESTHETIST, CERTIFIED REGISTERED

## 2023-07-28 PROCEDURE — 25010000002 FENTANYL CITRATE (PF) 50 MCG/ML SOLUTION: Performed by: ANESTHESIOLOGY

## 2023-07-28 PROCEDURE — G0378 HOSPITAL OBSERVATION PER HR: HCPCS

## 2023-07-28 PROCEDURE — 96375 TX/PRO/DX INJ NEW DRUG ADDON: CPT

## 2023-07-28 PROCEDURE — 96361 HYDRATE IV INFUSION ADD-ON: CPT

## 2023-07-28 PROCEDURE — 25510000001 IOPAMIDOL 61 % SOLUTION: Performed by: EMERGENCY MEDICINE

## 2023-07-28 PROCEDURE — 0 IOTHALAMATE 60 % SOLUTION: Performed by: SURGERY

## 2023-07-28 PROCEDURE — 25010000002 MORPHINE PER 10 MG: Performed by: EMERGENCY MEDICINE

## 2023-07-28 PROCEDURE — 36415 COLL VENOUS BLD VENIPUNCTURE: CPT

## 2023-07-28 PROCEDURE — 81001 URINALYSIS AUTO W/SCOPE: CPT | Performed by: EMERGENCY MEDICINE

## 2023-07-28 PROCEDURE — 74300 X-RAY BILE DUCTS/PANCREAS: CPT

## 2023-07-28 PROCEDURE — 25010000002 PIPERACILLIN SOD-TAZOBACTAM PER 1 G: Performed by: EMERGENCY MEDICINE

## 2023-07-28 PROCEDURE — 25010000002 ONDANSETRON PER 1 MG: Performed by: EMERGENCY MEDICINE

## 2023-07-28 PROCEDURE — 25010000002 HYDROMORPHONE PER 4 MG: Performed by: SURGERY

## 2023-07-28 PROCEDURE — 25010000002 DEXAMETHASONE SODIUM PHOSPHATE 20 MG/5ML SOLUTION: Performed by: ANESTHESIOLOGY

## 2023-07-28 PROCEDURE — 71045 X-RAY EXAM CHEST 1 VIEW: CPT

## 2023-07-28 PROCEDURE — 25010000002 PIPERACILLIN SOD-TAZOBACTAM PER 1 G: Performed by: SURGERY

## 2023-07-28 PROCEDURE — 47563 LAPARO CHOLECYSTECTOMY/GRAPH: CPT | Performed by: SURGERY

## 2023-07-28 PROCEDURE — 25010000002 MIDAZOLAM PER 1 MG: Performed by: ANESTHESIOLOGY

## 2023-07-28 PROCEDURE — 96365 THER/PROPH/DIAG IV INF INIT: CPT

## 2023-07-28 PROCEDURE — 99214 OFFICE O/P EST MOD 30 MIN: CPT | Performed by: SURGERY

## 2023-07-28 PROCEDURE — 25010000002 ONDANSETRON PER 1 MG: Performed by: NURSE ANESTHETIST, CERTIFIED REGISTERED

## 2023-07-28 PROCEDURE — 85025 COMPLETE CBC W/AUTO DIFF WBC: CPT | Performed by: EMERGENCY MEDICINE

## 2023-07-28 PROCEDURE — 93010 ELECTROCARDIOGRAM REPORT: CPT | Performed by: INTERNAL MEDICINE

## 2023-07-28 PROCEDURE — 83690 ASSAY OF LIPASE: CPT | Performed by: EMERGENCY MEDICINE

## 2023-07-28 DEVICE — LIGAMAX 5 MM ENDOSCOPIC MULTIPLE CLIP APPLIER
Type: IMPLANTABLE DEVICE | Site: ABDOMEN | Status: FUNCTIONAL
Brand: LIGAMAX

## 2023-07-28 RX ORDER — NALOXONE HCL 0.4 MG/ML
0.2 VIAL (ML) INJECTION AS NEEDED
Status: DISCONTINUED | OUTPATIENT
Start: 2023-07-28 | End: 2023-07-28 | Stop reason: HOSPADM

## 2023-07-28 RX ORDER — PROMETHAZINE HYDROCHLORIDE 25 MG/1
25 TABLET ORAL ONCE AS NEEDED
Status: DISCONTINUED | OUTPATIENT
Start: 2023-07-28 | End: 2023-07-28 | Stop reason: HOSPADM

## 2023-07-28 RX ORDER — OXYCODONE AND ACETAMINOPHEN 7.5; 325 MG/1; MG/1
1 TABLET ORAL EVERY 4 HOURS PRN
Status: DISCONTINUED | OUTPATIENT
Start: 2023-07-28 | End: 2023-07-28 | Stop reason: HOSPADM

## 2023-07-28 RX ORDER — DEXAMETHASONE SODIUM PHOSPHATE 4 MG/ML
INJECTION, SOLUTION INTRA-ARTICULAR; INTRALESIONAL; INTRAMUSCULAR; INTRAVENOUS; SOFT TISSUE AS NEEDED
Status: DISCONTINUED | OUTPATIENT
Start: 2023-07-28 | End: 2023-07-28 | Stop reason: SURG

## 2023-07-28 RX ORDER — SODIUM CHLORIDE 9 MG/ML
INJECTION, SOLUTION INTRAVENOUS AS NEEDED
Status: DISCONTINUED | OUTPATIENT
Start: 2023-07-28 | End: 2023-07-28 | Stop reason: HOSPADM

## 2023-07-28 RX ORDER — DIPHENHYDRAMINE HYDROCHLORIDE 50 MG/ML
12.5 INJECTION INTRAMUSCULAR; INTRAVENOUS
Status: DISCONTINUED | OUTPATIENT
Start: 2023-07-28 | End: 2023-07-28 | Stop reason: HOSPADM

## 2023-07-28 RX ORDER — MIDAZOLAM HYDROCHLORIDE 1 MG/ML
0.5 INJECTION INTRAMUSCULAR; INTRAVENOUS
Status: COMPLETED | OUTPATIENT
Start: 2023-07-28 | End: 2023-07-28

## 2023-07-28 RX ORDER — MORPHINE SULFATE 2 MG/ML
2 INJECTION, SOLUTION INTRAMUSCULAR; INTRAVENOUS ONCE
Status: COMPLETED | OUTPATIENT
Start: 2023-07-28 | End: 2023-07-28

## 2023-07-28 RX ORDER — DEXTROSE MONOHYDRATE, SODIUM CHLORIDE, AND POTASSIUM CHLORIDE 50; 1.49; 4.5 G/1000ML; G/1000ML; G/1000ML
75 INJECTION, SOLUTION INTRAVENOUS CONTINUOUS
Status: DISCONTINUED | OUTPATIENT
Start: 2023-07-28 | End: 2023-07-29 | Stop reason: HOSPADM

## 2023-07-28 RX ORDER — PROMETHAZINE HYDROCHLORIDE 25 MG/1
25 SUPPOSITORY RECTAL ONCE AS NEEDED
Status: DISCONTINUED | OUTPATIENT
Start: 2023-07-28 | End: 2023-07-28 | Stop reason: HOSPADM

## 2023-07-28 RX ORDER — FAMOTIDINE 10 MG/ML
20 INJECTION, SOLUTION INTRAVENOUS ONCE
Status: COMPLETED | OUTPATIENT
Start: 2023-07-28 | End: 2023-07-28

## 2023-07-28 RX ORDER — SODIUM CHLORIDE 0.9 % (FLUSH) 0.9 %
3 SYRINGE (ML) INJECTION EVERY 12 HOURS SCHEDULED
Status: DISCONTINUED | OUTPATIENT
Start: 2023-07-28 | End: 2023-07-28 | Stop reason: HOSPADM

## 2023-07-28 RX ORDER — OXYCODONE HYDROCHLORIDE AND ACETAMINOPHEN 5; 325 MG/1; MG/1
1 TABLET ORAL EVERY 4 HOURS PRN
Status: DISCONTINUED | OUTPATIENT
Start: 2023-07-28 | End: 2023-07-29 | Stop reason: HOSPADM

## 2023-07-28 RX ORDER — FENTANYL CITRATE 50 UG/ML
50 INJECTION, SOLUTION INTRAMUSCULAR; INTRAVENOUS
Status: DISCONTINUED | OUTPATIENT
Start: 2023-07-28 | End: 2023-07-28 | Stop reason: HOSPADM

## 2023-07-28 RX ORDER — FENTANYL CITRATE 50 UG/ML
50 INJECTION, SOLUTION INTRAMUSCULAR; INTRAVENOUS ONCE AS NEEDED
Status: COMPLETED | OUTPATIENT
Start: 2023-07-28 | End: 2023-07-28

## 2023-07-28 RX ORDER — HYDROMORPHONE HYDROCHLORIDE 1 MG/ML
0.5 INJECTION, SOLUTION INTRAMUSCULAR; INTRAVENOUS; SUBCUTANEOUS
Status: DISCONTINUED | OUTPATIENT
Start: 2023-07-28 | End: 2023-07-28 | Stop reason: HOSPADM

## 2023-07-28 RX ORDER — HYDROMORPHONE HYDROCHLORIDE 1 MG/ML
0.5 INJECTION, SOLUTION INTRAMUSCULAR; INTRAVENOUS; SUBCUTANEOUS
Status: DISCONTINUED | OUTPATIENT
Start: 2023-07-28 | End: 2023-07-29 | Stop reason: HOSPADM

## 2023-07-28 RX ORDER — LABETALOL HYDROCHLORIDE 5 MG/ML
5 INJECTION, SOLUTION INTRAVENOUS
Status: DISCONTINUED | OUTPATIENT
Start: 2023-07-28 | End: 2023-07-28 | Stop reason: HOSPADM

## 2023-07-28 RX ORDER — BUPIVACAINE HYDROCHLORIDE AND EPINEPHRINE 5; 5 MG/ML; UG/ML
INJECTION, SOLUTION EPIDURAL; INTRACAUDAL; PERINEURAL AS NEEDED
Status: DISCONTINUED | OUTPATIENT
Start: 2023-07-28 | End: 2023-07-28 | Stop reason: HOSPADM

## 2023-07-28 RX ORDER — BISACODYL 10 MG
10 SUPPOSITORY, RECTAL RECTAL DAILY PRN
Status: DISCONTINUED | OUTPATIENT
Start: 2023-07-28 | End: 2023-07-29 | Stop reason: HOSPADM

## 2023-07-28 RX ORDER — EPHEDRINE SULFATE 50 MG/ML
5 INJECTION, SOLUTION INTRAVENOUS ONCE AS NEEDED
Status: DISCONTINUED | OUTPATIENT
Start: 2023-07-28 | End: 2023-07-28 | Stop reason: HOSPADM

## 2023-07-28 RX ORDER — HYDRALAZINE HYDROCHLORIDE 20 MG/ML
5 INJECTION INTRAMUSCULAR; INTRAVENOUS
Status: DISCONTINUED | OUTPATIENT
Start: 2023-07-28 | End: 2023-07-28 | Stop reason: HOSPADM

## 2023-07-28 RX ORDER — POLYETHYLENE GLYCOL 3350 17 G/17G
17 POWDER, FOR SOLUTION ORAL DAILY PRN
Status: DISCONTINUED | OUTPATIENT
Start: 2023-07-28 | End: 2023-07-29 | Stop reason: HOSPADM

## 2023-07-28 RX ORDER — LIDOCAINE HYDROCHLORIDE 20 MG/ML
INJECTION, SOLUTION INFILTRATION; PERINEURAL AS NEEDED
Status: DISCONTINUED | OUTPATIENT
Start: 2023-07-28 | End: 2023-07-28 | Stop reason: SURG

## 2023-07-28 RX ORDER — DROPERIDOL 2.5 MG/ML
0.62 INJECTION, SOLUTION INTRAMUSCULAR; INTRAVENOUS
Status: DISCONTINUED | OUTPATIENT
Start: 2023-07-28 | End: 2023-07-28 | Stop reason: HOSPADM

## 2023-07-28 RX ORDER — ROCURONIUM BROMIDE 10 MG/ML
INJECTION, SOLUTION INTRAVENOUS AS NEEDED
Status: DISCONTINUED | OUTPATIENT
Start: 2023-07-28 | End: 2023-07-28 | Stop reason: SURG

## 2023-07-28 RX ORDER — ONDANSETRON 2 MG/ML
4 INJECTION INTRAMUSCULAR; INTRAVENOUS EVERY 6 HOURS PRN
Status: DISCONTINUED | OUTPATIENT
Start: 2023-07-28 | End: 2023-07-29 | Stop reason: HOSPADM

## 2023-07-28 RX ORDER — SODIUM CHLORIDE 0.9 % (FLUSH) 0.9 %
3-10 SYRINGE (ML) INJECTION AS NEEDED
Status: DISCONTINUED | OUTPATIENT
Start: 2023-07-28 | End: 2023-07-28 | Stop reason: HOSPADM

## 2023-07-28 RX ORDER — AMOXICILLIN 250 MG
2 CAPSULE ORAL 2 TIMES DAILY
Status: DISCONTINUED | OUTPATIENT
Start: 2023-07-28 | End: 2023-07-29 | Stop reason: HOSPADM

## 2023-07-28 RX ORDER — ACETAMINOPHEN 325 MG/1
650 TABLET ORAL EVERY 4 HOURS PRN
Status: DISCONTINUED | OUTPATIENT
Start: 2023-07-28 | End: 2023-07-29 | Stop reason: HOSPADM

## 2023-07-28 RX ORDER — HYDROCODONE BITARTRATE AND ACETAMINOPHEN 7.5; 325 MG/1; MG/1
1 TABLET ORAL ONCE AS NEEDED
Status: DISCONTINUED | OUTPATIENT
Start: 2023-07-28 | End: 2023-07-28 | Stop reason: HOSPADM

## 2023-07-28 RX ORDER — ONDANSETRON 2 MG/ML
8 INJECTION INTRAMUSCULAR; INTRAVENOUS ONCE
Status: COMPLETED | OUTPATIENT
Start: 2023-07-28 | End: 2023-07-28

## 2023-07-28 RX ORDER — BISACODYL 5 MG/1
5 TABLET, DELAYED RELEASE ORAL DAILY PRN
Status: DISCONTINUED | OUTPATIENT
Start: 2023-07-28 | End: 2023-07-29 | Stop reason: HOSPADM

## 2023-07-28 RX ORDER — ACETAMINOPHEN 500 MG
1000 TABLET ORAL ONCE
Status: COMPLETED | OUTPATIENT
Start: 2023-07-28 | End: 2023-07-28

## 2023-07-28 RX ORDER — PROPOFOL 10 MG/ML
VIAL (ML) INTRAVENOUS AS NEEDED
Status: DISCONTINUED | OUTPATIENT
Start: 2023-07-28 | End: 2023-07-28 | Stop reason: SURG

## 2023-07-28 RX ORDER — ONDANSETRON 2 MG/ML
4 INJECTION INTRAMUSCULAR; INTRAVENOUS ONCE AS NEEDED
Status: COMPLETED | OUTPATIENT
Start: 2023-07-28 | End: 2023-07-28

## 2023-07-28 RX ORDER — FLUMAZENIL 0.1 MG/ML
0.2 INJECTION INTRAVENOUS AS NEEDED
Status: DISCONTINUED | OUTPATIENT
Start: 2023-07-28 | End: 2023-07-28 | Stop reason: HOSPADM

## 2023-07-28 RX ORDER — SODIUM CHLORIDE, SODIUM LACTATE, POTASSIUM CHLORIDE, CALCIUM CHLORIDE 600; 310; 30; 20 MG/100ML; MG/100ML; MG/100ML; MG/100ML
9 INJECTION, SOLUTION INTRAVENOUS CONTINUOUS
Status: DISCONTINUED | OUTPATIENT
Start: 2023-07-28 | End: 2023-07-28

## 2023-07-28 RX ORDER — ACETAMINOPHEN 160 MG/5ML
650 SOLUTION ORAL EVERY 4 HOURS PRN
Status: DISCONTINUED | OUTPATIENT
Start: 2023-07-28 | End: 2023-07-29 | Stop reason: HOSPADM

## 2023-07-28 RX ORDER — ACETAMINOPHEN 650 MG/1
650 SUPPOSITORY RECTAL EVERY 4 HOURS PRN
Status: DISCONTINUED | OUTPATIENT
Start: 2023-07-28 | End: 2023-07-29 | Stop reason: HOSPADM

## 2023-07-28 RX ORDER — MAGNESIUM HYDROXIDE 1200 MG/15ML
LIQUID ORAL AS NEEDED
Status: DISCONTINUED | OUTPATIENT
Start: 2023-07-28 | End: 2023-07-28 | Stop reason: HOSPADM

## 2023-07-28 RX ORDER — IPRATROPIUM BROMIDE AND ALBUTEROL SULFATE 2.5; .5 MG/3ML; MG/3ML
3 SOLUTION RESPIRATORY (INHALATION) ONCE AS NEEDED
Status: DISCONTINUED | OUTPATIENT
Start: 2023-07-28 | End: 2023-07-28 | Stop reason: HOSPADM

## 2023-07-28 RX ADMIN — SODIUM CHLORIDE, POTASSIUM CHLORIDE, SODIUM LACTATE AND CALCIUM CHLORIDE 9 ML/HR: 600; 310; 30; 20 INJECTION, SOLUTION INTRAVENOUS at 19:16

## 2023-07-28 RX ADMIN — PIPERACILLIN SODIUM AND TAZOBACTAM SODIUM 3.38 G: 3; .375 INJECTION, SOLUTION INTRAVENOUS at 23:08

## 2023-07-28 RX ADMIN — FAMOTIDINE 20 MG: 10 INJECTION INTRAVENOUS at 09:53

## 2023-07-28 RX ADMIN — MIDAZOLAM 0.5 MG: 1 INJECTION INTRAMUSCULAR; INTRAVENOUS at 19:22

## 2023-07-28 RX ADMIN — PROPOFOL 200 MG: 10 INJECTION, EMULSION INTRAVENOUS at 20:24

## 2023-07-28 RX ADMIN — IOPAMIDOL 85 ML: 612 INJECTION, SOLUTION INTRAVENOUS at 09:58

## 2023-07-28 RX ADMIN — LIDOCAINE HYDROCHLORIDE 100 MG: 20 INJECTION, SOLUTION INFILTRATION; PERINEURAL at 20:24

## 2023-07-28 RX ADMIN — DEXAMETHASONE SODIUM PHOSPHATE 8 MG: 4 INJECTION, SOLUTION INTRAMUSCULAR; INTRAVENOUS at 20:32

## 2023-07-28 RX ADMIN — ACETAMINOPHEN 1000 MG: 500 TABLET ORAL at 19:10

## 2023-07-28 RX ADMIN — MORPHINE SULFATE 2 MG: 2 INJECTION, SOLUTION INTRAMUSCULAR; INTRAVENOUS at 09:55

## 2023-07-28 RX ADMIN — SUGAMMADEX 200 MG: 100 INJECTION, SOLUTION INTRAVENOUS at 21:18

## 2023-07-28 RX ADMIN — FENTANYL CITRATE 50 MCG: 50 INJECTION, SOLUTION INTRAMUSCULAR; INTRAVENOUS at 20:43

## 2023-07-28 RX ADMIN — ROCURONIUM BROMIDE 40 MG: 10 INJECTION, SOLUTION INTRAVENOUS at 20:24

## 2023-07-28 RX ADMIN — TAZOBACTAM SODIUM AND PIPERACILLIN SODIUM 3.38 G: 375; 3 INJECTION, SOLUTION INTRAVENOUS at 11:29

## 2023-07-28 RX ADMIN — SODIUM CHLORIDE 1000 ML: 9 INJECTION, SOLUTION INTRAVENOUS at 09:52

## 2023-07-28 RX ADMIN — SODIUM CHLORIDE, POTASSIUM CHLORIDE, SODIUM LACTATE AND CALCIUM CHLORIDE: 600; 310; 30; 20 INJECTION, SOLUTION INTRAVENOUS at 21:05

## 2023-07-28 RX ADMIN — ONDANSETRON 4 MG: 2 INJECTION INTRAMUSCULAR; INTRAVENOUS at 21:09

## 2023-07-28 RX ADMIN — ONDANSETRON 8 MG: 2 INJECTION INTRAMUSCULAR; INTRAVENOUS at 09:54

## 2023-07-28 RX ADMIN — POTASSIUM CHLORIDE, DEXTROSE MONOHYDRATE AND SODIUM CHLORIDE 75 ML/HR: 150; 5; 450 INJECTION, SOLUTION INTRAVENOUS at 17:04

## 2023-07-28 RX ADMIN — HYDROMORPHONE HYDROCHLORIDE 0.5 MG: 1 INJECTION, SOLUTION INTRAMUSCULAR; INTRAVENOUS; SUBCUTANEOUS at 22:50

## 2023-07-28 RX ADMIN — FAMOTIDINE 20 MG: 10 INJECTION INTRAVENOUS at 19:12

## 2023-07-28 RX ADMIN — MIDAZOLAM 0.5 MG: 1 INJECTION INTRAMUSCULAR; INTRAVENOUS at 19:13

## 2023-07-28 NOTE — CONSULTS
General Surgery Consultation    Consulting Physician: Dorina Rehman MD  Referring Physician: Raúl Granados MD    Reason for consultation: Acute cholecystitis    CC: Abdominal pain    HPI:   The patient is a very pleasant 67 y.o. female that presented to the hospital with acute onset epigastric abdominal pain with radiation to the back and right shoulder that started on Sunday of this week, 5 days ago.  She had associated nausea, vomiting, and objective chills that lasted through the night Sunday night and into Monday morning.  Then Monday afternoon her symptoms sporadically resolved until Wednesday when she again had severe indigestion, abdominal pain, and back pain.  She then saw her primary care provider who thought she may have severe gastritis and started her on an antacid.  Then this morning she awoke at 5 AM with the exact same symptoms that were markedly worse so she came to the emergency room where a CT abdomen/pelvis demonstrated evidence of acute calculus cholecystitis.     Past Medical History:  Asthma  Kidney stones  Hypertension  Type 2 diabetes    Past Surgical History:  Colonoscopy (2016, Dr. Hull)  Dilation and curettage  Cayce tooth extraction  Tubal ligation  Lithotripsy    Medications:  Medications Prior to Admission   Medication Sig Dispense Refill Last Dose    atorvastatin (Lipitor) 10 MG tablet Take 1 tablet by mouth Daily. To decrease risk of stroke and heart attack 90 tablet 3     Calcium 500-2.5 MG-MCG chewable tablet Chew 1 tablet Daily.       CALCIUM CITRATE PO Take  by mouth.       cholecalciferol (VITAMIN D3) 1000 UNITS tablet Take 1 tablet by mouth Daily.       DULoxetine (CYMBALTA) 30 MG capsule TAKE 1 CAPSULE EVERY DAY 90 capsule 1     hydroCHLOROthiazide (HYDRODIURIL) 12.5 MG tablet Take 1 tablet by mouth Daily. 90 tablet 1     Hydrocod Vlad-Chlorphe Vlad ER (Tussionex Pennkinetic ER) 10-8 MG/5ML ER suspension Take 5 mL by mouth Every 12 (Twelve) Hours As Needed for Cough.  (Patient not taking: Reported on 7/26/2023) 75 mL 0     loratadine (CLARITIN) 10 MG tablet TAKE 1 TABLET EVERY DAY 90 tablet 0     metFORMIN ER (GLUCOPHAGE-XR) 500 MG 24 hr tablet TAKE 1 TABLET EVERY DAY WITH BREAKFAST FOR PREDIABETES 90 tablet 1     Mometasone Furoate (Asmanex HFA) 100 MCG/ACT aerosol Inhale 1-2 puffs 2 (Two) Times a Day As Needed (cough). (Patient not taking: Reported on 7/26/2023) 1 each 0     omeprazole (priLOSEC) 20 MG capsule Take 1 capsule by mouth Daily for 20 days. 20 capsule 0     VITAMIN D PO Take  by mouth.          Allergies: No known drug allergies    Social History: , non-smoker, rare alcohol use, consistently uses marijuana products nightly    Family History: Mother with history of COPD and bladder cancer, father with progressive supranuclear palsy, maternal grandmother with history of breast cancer, daughter with history of gallbladder disease    Review of Systems:  Constitutional: Positive for subjective chills; denies any weight changes, fatigue, or weakness  Eyes: denies blurred/double vision or scleral icterus  Cardiovascular: denies chest pain, palpitations, or edemas  Respiratory: denies cough, sputum, or dyspnea  Gastrointestinal: Positive for abdominal pain, nausea, and vomiting; denies melena, hematochezia, diarrhea, or constipation  Genitourinary: denies dysuria or hematuria  Endocrine: denies cold intolerance, lethargy, or flushing  Hematologic: denies excessive bruising or bleeding  Musculoskeletal: denies weakness, joint swelling, joint pain, or stiffness  Neurologic: denies seizures, CVA, paresthesia, or peripheral neuropathy  Skin: denies change in nevi, rashes, masses, or jaundice     All other systems reviewed and were negative.    Physical Exam:   Vitals:    07/28/23 1031   BP: 133/81   Pulse: 68   Resp:    Temp:    SpO2: 96%     Height: 165 cm  Weight: 98 kg  BMI: 36  GENERAL: awake and alert, no acute distress, oriented to person, place, and time  HEENT:  normocephalic, atraumatic, no scleral icterus, moist mucous membranes  NECK: Supple, there is no thyromegaly or lymphadenopathy  RESPIRATORY: clear to auscultation, no wheezes, rales or rhonchi, symmetric air entry  CARDIOVASCULAR: regular rate and rhythm    GASTROINTESTINAL: Soft, nondistended, obese, no obvious peritonitis but she does have mild tenderness in the right upper quadrant  MUSCULOSKELETAL: no cyanosis, clubbing, or edema   NEUROLOGIC: alert and oriented, normal speech, cranial nerves 2-12 grossly intact, no focal deficits   SKIN: Moist, warm, no rashes, no jaundice      Diagnostic workup:     Pertinent labs:   Results from last 7 days   Lab Units 07/28/23  0859 07/26/23  1005   WBC 10*3/mm3 7.66 9.98   HEMOGLOBIN g/dL 15.4 15.6   HEMATOCRIT % 46.1 46.5   PLATELETS 10*3/mm3 235 244     Results from last 7 days   Lab Units 07/28/23  0859 07/26/23  1005   SODIUM mmol/L 143 142   POTASSIUM mmol/L 3.6 4.3   CHLORIDE mmol/L 103 102   CO2 mmol/L 28.0 26.6   BUN mg/dL 18 18   CREATININE mg/dL 0.91 0.85   CALCIUM mg/dL 9.9 9.8   BILIRUBIN mg/dL 0.6 0.5   ALK PHOS U/L 66 62   ALT (SGPT) U/L 16 15   AST (SGOT) U/L 15 17   GLUCOSE mg/dL 168* 119*       IMAGING:  CT ABD/PELVIS:  IMPRESSION:  1. There is likely acute cholecystitis with a 1.5 cm gallstone within the neck of the gallbladder. There is no biliary dilatation.  2. Single 3 mm nonobstructing stone within the right kidney.    Assessment and plan:     The patient is a 67 y.o. female with acute calculus cholecystitis    I reviewed her CT scan and blood work done in the emergency room.  I would recommend scheduling her for laparoscopic cholecystectomy with cholangiogram tonight.  She understands the risks of the procedure include bleeding, common bile duct injury, postoperative bile leak, and possible wound infection.  Despite these risks, she has consented to proceed and should remain n.p.o. with empiric antibiotics already started by the primary team.  All  questions were answered and she is in agreement with this plan.    Dorina Rehman MD  General, Robotic, and Endoscopic Surgery  [facility]    [address], [address]  [place] [address]  P: [phone]  F: [phone]

## 2023-07-28 NOTE — PLAN OF CARE
Goal Outcome Evaluation:  Plan of Care Reviewed With: patient        Progress: no change  Outcome Evaluation: Patient alert and oriented x4, reports abdominal pain/discomfort, denies need for pain medication at this time, vss, afebrile, IVF infusing, NPO since midnight, will have laparoscopic cholecystectomy by Dr. Rehman this evening, consent signed.

## 2023-07-28 NOTE — ED NOTES
Patient to ER via car from home for upper abd pain going to back x Sunday    Patient was seen by PCP on tuesday

## 2023-07-28 NOTE — H&P
HISTORY AND PHYSICAL   [facility]        Date of Admission: 2023  Patient Identification:  Name: Emerita Matthews  Age: 67 y.o.  Sex: female  :  1955  MRN: [id number]                     Primary Care Physician: Josselyn Alex MD    Chief Complaint:  67 year old female who presented to the emergency room with abdominal pain which has been intermittent for several days; she has also had some nausea and vomiting; she denies fever or chills; she felt worse today so she cam eto the ED    History of Present Illness:   As above    Past Medical History:  Past Medical History:   Diagnosis Date    Allergic rhinitis     Anemia     Anxiety     Asthma     Fatty liver     Headache, tension-type     Kidney stones 2019    Memory loss     Obesity 1975     Past Surgical History:  Past Surgical History:   Procedure Laterality Date    COLONOSCOPY N/A     NBIH otherwise normal-Dr. Hull    DENTAL PROCEDURE      implants    DILATATION AND CURETTAGE      EYE SURGERY      KIDNEY STONE SURGERY      TUBAL ABDOMINAL LIGATION      WISDOM TOOTH EXTRACTION        Home Meds:  Medications Prior to Admission   Medication Sig Dispense Refill Last Dose    atorvastatin (Lipitor) 10 MG tablet Take 1 tablet by mouth Daily. To decrease risk of stroke and heart attack 90 tablet 3     Calcium 500-2.5 MG-MCG chewable tablet Chew 1 tablet Daily.       CALCIUM CITRATE PO Take  by mouth.       cholecalciferol (VITAMIN D3) 1000 UNITS tablet Take 1 tablet by mouth Daily.       DULoxetine (CYMBALTA) 30 MG capsule TAKE 1 CAPSULE EVERY DAY 90 capsule 1     hydroCHLOROthiazide (HYDRODIURIL) 12.5 MG tablet Take 1 tablet by mouth Daily. 90 tablet 1     Hydrocod Vlad-Chlorphe Vlad ER (Tussionex Pennkinetic ER) 10-8 MG/5ML ER suspension Take 5 mL by mouth Every 12 (Twelve) Hours As Needed for Cough. (Patient not taking: Reported on 2023) 75 mL 0     loratadine (CLARITIN) 10 MG tablet TAKE 1 TABLET EVERY DAY 90 tablet 0      metFORMIN ER (GLUCOPHAGE-XR) 500 MG 24 hr tablet TAKE 1 TABLET EVERY DAY WITH BREAKFAST FOR PREDIABETES 90 tablet 1     Mometasone Furoate (Asmanex HFA) 100 MCG/ACT aerosol Inhale 1-2 puffs 2 (Two) Times a Day As Needed (cough). (Patient not taking: Reported on 7/26/2023) 1 each 0     omeprazole (priLOSEC) 20 MG capsule Take 1 capsule by mouth Daily for 20 days. 20 capsule 0     VITAMIN D PO Take  by mouth.          Allergies:  No Known Allergies  Immunizations:  Immunization History   Administered Date(s) Administered    COVID-19 (PFIZER) BIVALENT 12+YRS 12/02/2022    COVID-19 (PFIZER) Purple Cap Monovalent 02/02/2021, 02/24/2021, 08/20/2021    Covid-19 (Pfizer) Gray Cap Monovalent 06/02/2022    Flu Vaccine Intradermal Quad 18-64YR 10/05/2018, 10/18/2019    Fluzone High-Dose 65+yrs 10/25/2021    Fluzone Quad >6mos (Multi-dose) 01/07/2017, 10/20/2017    Influenza Quad Vaccine (Inpatient) 10/20/2017    Influenza TIV (IM) 10/02/2014, 10/30/2015    Influenza, Unspecified 10/01/2020    Pneumococcal Conjugate 13-Valent (PCV13) 09/15/2016    Pneumococcal Polysaccharide (PPSV23) 10/20/2017    Tdap 10/30/2015, 12/26/2016    Zostavax 09/15/2016    flucelvax quad pfs =>4 YRS 10/05/2018, 10/18/2019     Social History:   Social History     Social History Narrative         Social History     Socioeconomic History    Marital status:    Tobacco Use    Smoking status: Never    Smokeless tobacco: Never   Vaping Use    Vaping Use: Never used   Substance and Sexual Activity    Alcohol use: Yes     Comment: Seldom    Drug use: Yes     Frequency: 7.0 times per week     Types: Marijuana     Comment: CBD gummies     Sexual activity: Not Currently     Partners: Male       Family History:  Family History   Problem Relation Age of Onset    COPD Mother     Macular degeneration Mother     Arthritis Mother     Cancer Mother         bladder    Other Father         Progressive supranuclear palsy    Parkinsonism Brother      "Breast cancer Maternal Grandmother     Cancer Maternal Grandmother         breast    Arthritis Paternal Grandmother     Gallbladder disease Daughter     Other Brother         Parkinson’s        Review of Systems  See history of present illness and past medical history.  Patient denies headache, dizziness, syncope, falls, trauma, change in vision, change in hearing, change in taste, changes in weight, changes in appetite, focal weakness, numbness, or paresthesia.  Patient denies chest pain, palpitations, dyspnea, orthopnea, PND, cough, sinus pressure, rhinorrhea, epistaxis, hemoptysis, nausea, vomiting,hematemesis, diarrhea, constipation or hematochezia.  Denies cold or heat intolerance, polydipsia, polyuria, polyphagia. Denies hematuria, pyuria, dysuria, hesitancy, frequency or urgency. Denies consumption of raw and under cooked meats foods or change in water source.  Denies fever, chills, sweats, night sweats.  Denies missing any routine medications. Remainder of ROS is negative.    Objective:  T Max 24 hrs: Temp (24hrs), Av.2 °F (36.8 °C), Min:97.5 °F (36.4 °C), Max:98.6 °F (37 °C)    Vitals Ranges:   Temp:  [97.5 °F (36.4 °C)-98.6 °F (37 °C)] 98.6 °F (37 °C)  Heart Rate:  [61-91] 86  Resp:  [18-20] 20  BP: (116-144)/(77-92) 131/92      Exam:  /92 (BP Location: Left arm, Patient Position: Lying)   Pulse 86   Temp 98.6 °F (37 °C) (Oral)   Resp 20   Ht 165.1 cm (65\")   Wt 98.4 kg (216 lb 14.9 oz)   LMP  (LMP Unknown)   SpO2 98%   BMI 36.10 kg/m²     General Appearance:    Alert, cooperative, no distress, appears stated age   Head:    Normocephalic, without obvious abnormality, atraumatic   Eyes:    PERRL, conjunctivae/corneas clear, EOM's intact, both eyes   Ears:    Normal external ear canals, both ears   Nose:   Nares normal, septum midline, mucosa normal, no drainage    or sinus tenderness   Throat:   Lips, mucosa, and tongue normal   Neck:   Supple, symmetrical, trachea midline, no adenopathy; "     thyroid:  no enlargement/tenderness/nodules; no carotid    bruit or JVD   Back:     Symmetric, no curvature, ROM normal, no CVA tenderness   Lungs:     Clear to auscultation bilaterally, respirations unlabored   Chest Wall:    No tenderness or deformity    Heart:    Regular rate and rhythm, S1 and S2 normal, no murmur, rub   or gallop   Abdomen:     Soft, nontender, bowel sounds active all four quadrants,     no masses, no hepatomegaly, no splenomegaly   Extremities:   Extremities normal, atraumatic, no cyanosis or edema   Pulses:   2+ and symmetric all extremities   Skin:   Skin color, texture, turgor normal, no rashes or lesions   Lymph nodes:   Cervical, supraclavicular, and axillary nodes normal   Neurologic:   CNII-XII intact, normal strength, sensation intact throughout      .    Data Review:  Labs in chart were reviewed.  WBC   Date Value Ref Range Status   07/28/2023 7.66 3.40 - 10.80 10*3/mm3 Final   07/26/2023 9.98 3.40 - 10.80 10*3/mm3 Final     Hemoglobin   Date Value Ref Range Status   07/28/2023 15.4 12.0 - 15.9 g/dL Final     Hematocrit   Date Value Ref Range Status   07/28/2023 46.1 34.0 - 46.6 % Final     Platelets   Date Value Ref Range Status   07/28/2023 235 140 - 450 10*3/mm3 Final     Sodium   Date Value Ref Range Status   07/28/2023 143 136 - 145 mmol/L Final     Potassium   Date Value Ref Range Status   07/28/2023 3.6 3.5 - 5.2 mmol/L Final     Chloride   Date Value Ref Range Status   07/28/2023 103 98 - 107 mmol/L Final     CO2   Date Value Ref Range Status   07/28/2023 28.0 22.0 - 29.0 mmol/L Final     BUN   Date Value Ref Range Status   07/28/2023 18 8 - 23 mg/dL Final     Creatinine   Date Value Ref Range Status   07/28/2023 0.91 0.57 - 1.00 mg/dL Final     Glucose   Date Value Ref Range Status   07/28/2023 168 (H) 65 - 99 mg/dL Final     Calcium   Date Value Ref Range Status   07/28/2023 9.9 8.6 - 10.5 mg/dL Final                Imaging Results (All)       Procedure Component Value  Units Date/Time    CT Abdomen Pelvis With Contrast [[id number]] Collected: 07/28/23 1041     Updated: 07/28/23 1633    Narrative:      CT ABDOMEN AND PELVIS WITH IV CONTRAST     HISTORY: 67-year-old female with upper abdominal pain radiating to the  back.     TECHNIQUE: Radiation dose reduction techniques were utilized, including  automated exposure control and exposure modulation based on body size.   3 mm images were obtained through the abdomen and pelvis after the  administration of IV contrast. There are no priors for comparison.     FINDINGS: The liver appears unremarkable. The gallbladder is distended  and there is a 1.5 cm gallstone within the neck of the gallbladder and 2  other similar sized stones within the body of the gallbladder. There is  no intrahepatic or extrahepatic biliary dilatation. Gallbladder wall is  mildly thickened and there is a trace amount of pericholecystic fluid.  The spleen, pancreas, adrenals, and left kidney appear unremarkable.  Right kidney appears unremarkable other than a single 3 mm  nonobstructing stone. There are no ureteral stones or hydronephrosis  bilaterally. There is a very small hiatal hernia. There is a paucity of  formed stool within the colon and there are long segments of colon which  are collapsed. No definitive colonic thickening is seen. The appendix  appears within normal limits. Uterus and adnexa appear unremarkable.  There is no free fluid or lymphadenopathy. There are mild abdominal  aortic atherosclerotic changes without aneurysmal dilatation. There is a  partially peripherally calcified 1.1 cm splenic artery aneurysm.       Impression:      1. There is likely acute cholecystitis with a 1.5 cm gallstone within  the neck of the gallbladder. There is no biliary dilatation.  2. Single 3 mm nonobstructing stone within the right kidney.     This report was finalized on 7/28/2023 4:30 PM by Dr. Karla Glover M.D.       XR Chest 1 View [[id number]] Collected: 07/28/23  1129     Updated: 07/28/23 1227    Narrative:      Portable chest radiograph     HISTORY:Chest pain     TECHNIQUE: Single AP portable radiograph of the chest     COMPARISON:None       Impression:      FINDINGS AND IMPRESSION:  No pneumothorax or pulmonary consolidation is seen. Cardiac silhouette  is at the upper lungs normal to borderline enlarged in size.     This report was finalized on 7/28/2023 12:24 PM by Dr. Buck Wright M.D.                 Assessment:  Active Hospital Problems    Diagnosis  POA    Acute calculous cholecystitis [K80.00]  Yes      Resolved Hospital Problems   No resolved problems to display.   Abdominal pain  Nausea and vomiting  Obesity  hyperglycemia    Plan:  OR is planned  Pain is better  Monitor blood sugar and blood pressure  Trend labs  Dw patient and ed provider as well as family   Linda Stephens MD  7/28/2023  19:30 EDT

## 2023-07-28 NOTE — ANESTHESIA PREPROCEDURE EVALUATION
Anesthesia Evaluation     NPO Solid Status: > 8 hours             Airway   Mallampati: I  Dental      Pulmonary    (+) a smoker,  (-) sleep apnea    ROS comment: Negative patient screen for WILBUR    Cardiovascular     (+) hypertension      Neuro/Psych  (+) headaches, psychiatric history Anxiety  GI/Hepatic/Renal/Endo    (+) obesity, liver disease fatty liver disease    Musculoskeletal     Abdominal    Substance History       Comment: +THC   OB/GYN          Other                      Anesthesia Plan    ASA 2     general       Anesthetic plan, risks, benefits, and alternatives have been provided, discussed and informed consent has been obtained with: patient.    CODE STATUS:    Code Status (Patient has no pulse and is not breathing): CPR (Attempt to Resuscitate)  Medical Interventions (Patient has pulse or is breathing): Full Support  Release to patient: Routine Release

## 2023-07-28 NOTE — ED PROVIDER NOTES
EMERGENCY DEPARTMENT ENCOUNTER  I wore full protective equipment throughout this patient encounter including a N95 mask, eye shield, gown and gloves. Hand hygiene was performed before donning protective equipment and after removal when leaving the room.    Room Number:  39/39  Date of encounter:  7/28/2023  PCP: Josselyn Alex MD  Patient Care Team:  Josselyn Alex MD as PCP - General (Internal Medicine)     HPI:  Context: Emerita Matthews is a 67 y.o. female who presents to the ED c/o chief complaint of abdominal pain.  Patient complains of intermittent abdominal pain since Sunday.  Patient planes of upper abdominal pain, radiates across the upper abdomen, no further radiation, patient denies any inciting events, reports pain last for hours at a time.  Patient denies any aggravating or ameliorating factors, pain is not improved or worsened with eating or drinking.  Patient reports that she has been having occasional nausea vomiting, reports 2 episodes today, emesis nonbloody nonbilious.  Patient reports that she had 2 small episodes of diarrhea this morning, no blood or mucus.  Patient denies any urinary symptoms, no fever or systemic symptoms.  No chest pain or shortness of breath.  Patient reports prior tubal ligation, no other abdominal surgeries, denies any history of pancreatitis or gallstones, drinks alcohol extremely rarely, denies any history of peptic ulcer disease, reports that she was seen by primary care provider earlier this week, started on Pepcid, had a single dose last night as well is a dose this morning but she vomited up the medication this morning.    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 Hull    DENTAL PROCEDURE      implants    DILATATION AND CURETTAGE  1988    EYE SURGERY  2021    KIDNEY STONE SURGERY      TUBAL ABDOMINAL LIGATION  1997    WISDOM TOOTH EXTRACTION         FAMILY HISTORY  Family History   Problem Relation Age of Onset    COPD Mother     Macular degeneration Mother     Arthritis Mother     Cancer Mother         bladder    Other Father         Progressive supranuclear palsy    Parkinsonism Brother     Breast cancer Maternal Grandmother     Cancer Maternal Grandmother         breast    Arthritis Paternal Grandmother     Gallbladder disease Daughter     Other Brother         Parkinson’s       SOCIAL HISTORY  Social History     Socioeconomic History    Marital status:    Tobacco Use    Smoking status: Never    Smokeless tobacco: Never   Vaping Use    Vaping Use: Never used   Substance and Sexual Activity    Alcohol use: Yes     Comment: Seldom    Drug use: Yes     Frequency: 7.0 times per week     Types: Marijuana     Comment: CBD gummies     Sexual activity: Not Currently     Partners: Male       ALLERGIES  Patient has no known allergies.    The patient's allergies have been reviewed    REVIEW OF SYSTEMS  All systems reviewed and negative except for those discussed in HPI.     PHYSICAL EXAM  I have reviewed the triage vital signs and nursing notes.  ED Triage Vitals  [07/28/23 0834]   Temp Heart Rate Resp BP SpO2   97.5 °F (36.4 °C) 83 18 -- 95 %      Temp src Heart Rate Source Patient Position BP Location FiO2 (%)   -- -- -- -- --       General: No acute distress.  HENT: NCAT, PERRL, Nares patent.  Eyes: no scleral icterus.  Neck: trachea midline, no ROM limitations.  CV: regular rhythm, regular rate.  Respiratory: normal effort, CTAB.  Abdomen: soft, nondistended, upper abdominal tenderness, greatest in the epigastrium, negative Henderson's, negative McBurney's, no rebound tenderness, no guarding or rigidity.  Musculoskeletal: no deformity.  Neuro: alert, moves all extremities, follows commands.  Skin: warm, dry.    LAB RESULTS  Recent Results (from the past 24 hour(s))   ECG 12 Lead Other; upper abd    Collection Time: 07/28/23  8:40 AM   Result Value Ref Range    QT Interval 458 ms   Comprehensive Metabolic Panel    Collection Time: 07/28/23  8:59 AM    Specimen: Blood   Result Value Ref Range    Glucose 168 (H) 65 - 99 mg/dL    BUN 18 8 - 23 mg/dL    Creatinine 0.91 0.57 - 1.00 mg/dL    Sodium 143 136 - 145 mmol/L    Potassium 3.6 3.5 - 5.2 mmol/L    Chloride 103 98 - 107 mmol/L    CO2 28.0 22.0 - 29.0 mmol/L    Calcium 9.9 8.6 - 10.5 mg/dL    Total Protein 7.6 6.0 - 8.5 g/dL    Albumin 4.8 3.5 - 5.2 g/dL    ALT (SGPT) 16 1 - 33 U/L    AST (SGOT) 15 1 - 32 U/L    Alkaline Phosphatase 66 39 - 117 U/L    Total Bilirubin 0.6 0.0 - 1.2 mg/dL    Globulin 2.8 gm/dL    A/G Ratio 1.7 g/dL    BUN/Creatinine Ratio 19.8 7.0 - 25.0    Anion Gap 12.0 5.0 - 15.0 mmol/L    eGFR 69.3 >60.0 mL/min/1.73   Lipase    Collection Time: 07/28/23  8:59 AM    Specimen: Blood   Result Value Ref Range    Lipase 28 13 - 60 U/L   High Sensitivity Troponin T    Collection Time: 07/28/23  8:59 AM    Specimen: Blood   Result Value Ref Range    HS Troponin T <6 <10 ng/L   CBC Auto Differential    Collection Time: 07/28/23  8:59 AM    Specimen: Blood   Result Value Ref Range    WBC 7.66 3.40 - 10.80 10*3/mm3     RBC 5.30 (H) 3.77 - 5.28 10*6/mm3    Hemoglobin 15.4 12.0 - 15.9 g/dL    Hematocrit 46.1 34.0 - 46.6 %    MCV 87.0 79.0 - 97.0 fL    MCH 29.1 26.6 - 33.0 pg    MCHC 33.4 31.5 - 35.7 g/dL    RDW 13.0 12.3 - 15.4 %    RDW-SD 41.2 37.0 - 54.0 fl    MPV 9.4 6.0 - 12.0 fL    Platelets 235 140 - 450 10*3/mm3    Neutrophil % 67.7 42.7 - 76.0 %    Lymphocyte % 21.9 19.6 - 45.3 %    Monocyte % 7.0 5.0 - 12.0 %    Eosinophil % 3.0 0.3 - 6.2 %    Basophil % 0.3 0.0 - 1.5 %    Immature Grans % 0.1 0.0 - 0.5 %    Neutrophils, Absolute 5.18 1.70 - 7.00 10*3/mm3    Lymphocytes, Absolute 1.68 0.70 - 3.10 10*3/mm3    Monocytes, Absolute 0.54 0.10 - 0.90 10*3/mm3    Eosinophils, Absolute 0.23 0.00 - 0.40 10*3/mm3    Basophils, Absolute 0.02 0.00 - 0.20 10*3/mm3    Immature Grans, Absolute 0.01 0.00 - 0.05 10*3/mm3    nRBC 0.0 0.0 - 0.2 /100 WBC   High Sensitivity Troponin T 2Hr    Collection Time: 07/28/23 11:04 AM    Specimen: Blood   Result Value Ref Range    HS Troponin T 7 <10 ng/L    Troponin T Delta     Urinalysis With Microscopic If Indicated (No Culture) - Urine, Clean Catch    Collection Time: 07/28/23 11:45 AM    Specimen: Urine, Clean Catch   Result Value Ref Range    Color, UA Yellow Yellow, Straw    Appearance, UA Clear Clear    pH, UA 7.5 5.0 - 8.0    Specific Gravity, UA >=1.030 1.005 - 1.030    Glucose, UA Negative Negative    Ketones, UA Negative Negative    Bilirubin, UA Negative Negative    Blood, UA Negative Negative    Protein, UA Negative Negative    Leuk Esterase, UA Negative Negative    Nitrite, UA Positive (A) Negative    Urobilinogen, UA 0.2 E.U./dL 0.2 - 1.0 E.U./dL   Urinalysis, Microscopic Only - Urine, Clean Catch    Collection Time: 07/28/23 11:45 AM    Specimen: Urine, Clean Catch   Result Value Ref Range    RBC, UA 0-2 None Seen, 0-2 /HPF    WBC, UA 0-2 None Seen, 0-2 /HPF    Bacteria, UA 4+ (A) None Seen /HPF    Squamous Epithelial Cells, UA 0-2 None Seen, 0-2 /HPF    Hyaline Casts, UA 0-2 None  Seen /LPF    Methodology Automated Microscopy        I ordered the above labs and reviewed the results.    RADIOLOGY  CT Abdomen Pelvis With Contrast    Result Date: 7/28/2023  CT ABDOMEN AND PELVIS WITH IV CONTRAST  HISTORY: 67-year-old female with upper abdominal pain radiating to the back.  TECHNIQUE: Radiation dose reduction techniques were utilized, including automated exposure control and exposure modulation based on body size. 3 mm images were obtained through the abdomen and pelvis after the administration of IV contrast. There are no priors for comparison.  FINDINGS: The liver appears unremarkable. The gallbladder is distended and there is a 1.5 cm gallstone within the neck of the gallbladder and 2 other similar sized stones within the body of the gallbladder. There is no intrahepatic or extrahepatic biliary dilatation. Gallbladder wall is mildly thickened and there is a trace amount of pericholecystic fluid. The spleen, pancreas, adrenals, and left kidney appear unremarkable. Right kidney appears unremarkable other than a single 3 mm nonobstructing stone. There are no ureteral stones or hydronephrosis bilaterally. There is a very small hiatal hernia. There is a paucity of formed stool within the colon and there are long segments of colon which are collapsed. No definitive colonic thickening is seen. The appendix appears within normal limits. Uterus and adnexa appear unremarkable. There is no free fluid or lymphadenopathy. There are mild abdominal aortic atherosclerotic changes without aneurysmal dilatation. There is a partially peripherally calcified 1.1 cm splenic artery aneurysm.      1. There is likely acute cholecystitis with a 1.5 cm gallstone within the neck of the gallbladder. There is no biliary dilatation. 2. Single 3 mm nonobstructing stone within the right kidney.      XR Chest 1 View    Result Date: 7/28/2023  Portable chest radiograph  HISTORY:Chest pain  TECHNIQUE: Single AP portable  radiograph of the chest  COMPARISON:None      FINDINGS AND IMPRESSION: No pneumothorax or pulmonary consolidation is seen. Cardiac silhouette is at the upper lungs normal to borderline enlarged in size.  This report was finalized on 7/28/2023 12:24 PM by Dr. Buck Wright M.D.       I ordered the above noted radiological studies. I reviewed the images and results. I agree with the radiologist interpretation.    PROCEDURES  Procedures    MEDICATIONS GIVEN IN ER  Medications   morphine injection 2 mg (2 mg Intravenous Given 7/28/23 0955)   ondansetron (ZOFRAN) injection 8 mg (8 mg Intravenous Given 7/28/23 0954)   sodium chloride 0.9 % bolus 1,000 mL (0 mL Intravenous Stopped 7/28/23 1129)   famotidine (PEPCID) injection 20 mg (20 mg Intravenous Given 7/28/23 0953)   iopamidol (ISOVUE-300) 61 % injection 100 mL (85 mL Intravenous Given by Other 7/28/23 0958)   piperacillin-tazobactam (ZOSYN) 3.375 g in iso-osmotic dextrose 50 ml (premix) (3.375 g Intravenous New Bag 7/28/23 1129)       PROGRESS, DATA ANALYSIS, CONSULTS, AND MEDICAL DECISION MAKING  A complete history and physical exam have been performed.  All available laboratory and imaging results have been reviewed by myself prior to disposition.    MDM    After the initial H&P, I discussed pertinent information from history and physical exam with patient/family.  Discussed differential diagnosis.  Discussed plan for ED evaluation/workup/treatment.  All questions answered.  Patient/family is agreeable with plan.  ED Course as of 07/28/23 1319   Fri Jul 28, 2023   0840 My differential diagnosis for abdominal pain includes but is not limited to:  Gastritis, gastroenteritis, peptic ulcer disease, GERD, esophageal perforation, acute appendicitis, mesenteric adenitis, Meckel's diverticulum, epiploic appendagitis, diverticulitis, colon cancer, ulcerative colitis, Crohn's disease, intussusception, small bowel obstruction, adhesions, ischemic bowel, perforated viscus,  ileus, obstipation, biliary colic, cholecystitis, cholelithiasis, Brayan-Manuel Jose, hepatitis, pancreatitis, common bile duct obstruction, cholangitis, bile leak, splenic trauma, splenic rupture, splenic infarction, splenic abscess, abdominal abscess, ascites, spontaneous bacterial peritonitis, hernia, UTI, cystitis, prostatitis, ureterolithiasis, urinary obstruction, ovarian cyst, torsion, pregnancy, ectopic pregnancy, PID, pelvic abscess, mittelschmerz, endometriosis, AAA, myocardial infarction, pneumonia, cancer, porphyria, DKA, medications, sickle cell, viral syndrome, zoster   [JG]   0849 EKG independently viewed and contemporaneously interpreted by ED physician. Time: 8:40 AM.  Rate 61.  Interpretation: Normal sinus rhythm, normal axis, left atrial enlargement, normal QRS, no ST elevation or depression, T wave inversion V1 through V3. [JG]   0850 When compared with prior EKG on 8/21/2019, no acute changes are present, anterior T wave inversions on current EKG appears secondary to Lead placement. [JG]   1038 I reviewed CT imaging in PACS, no free air per my read. [JG]   1119 I viewed chest x-ray in PACS, no pulmonary infiltrates per my read. [JG]   1120 CT imaging concerning for acute cholecystitis, given Dionna, consulting general surgery. [JG]   1125 Patient reassessed.  Discussed ED findings, differential diagnosis, and the need for admission for evaluation/treatment.  They are agreeable to admission and all questions were answered.     [JG]   1237 Phone call with Dr. Erwin, general surgery.  Discussed the patient, relevant history, exam, diagnostics, ED findings/progress, and concerns.  She agrees to take patient to the OR today, request admission to hospitalist.  They agree to consult.    [JG]   1318 Phone call with Dr. Stephens, [facility].  Discussed the patient, relevant history, exam, diagnostics, ED findings/progress, and concerns. They agree to admit the patient to [facility] observation. Care assumed by the  admitting physician at this time.     [JG]      ED Course User Index  [JG] Raúl Granados MD       AS OF 13:19 EDT VITALS:    BP - 133/81  HR - 68  TEMP - 97.5 °F (36.4 °C)  O2 SATS - 96%    DIAGNOSIS  Final diagnoses:   Acute cholecystitis   Hyperglycemia         DISPOSITION  ADMISSION    Discussed treatment plan and reason for admission with pt/family and admitting physician.  Pt/family voiced understanding of the plan for admission for further testing/treatment as needed.        Raúl Granados MD  07/28/23 1591

## 2023-07-28 NOTE — ED NOTES
Nursing report ED to floor  Emerita Matthews  67 y.o.  female    HPI :   Chief Complaint   Patient presents with    Abdominal Pain    Back Pain       Admitting doctor:   Linda Stephens MD    Admitting diagnosis:   The primary encounter diagnosis was Acute cholecystitis. A diagnosis of Hyperglycemia was also pertinent to this visit.    Code status:   Current Code Status       Date Active Code Status Order ID Comments User Context       Prior            Allergies:   Patient has no known allergies.    Isolation:   No active isolations    Intake and Output    Intake/Output Summary (Last 24 hours) at 7/28/2023 1415  Last data filed at 7/28/2023 1201  Gross per 24 hour   Intake 1050 ml   Output --   Net 1050 ml       Weight:       07/28/23  0849   Weight: 99.8 kg (220 lb)       Most recent vitals:   Vitals:    07/28/23 0926 07/28/23 0927 07/28/23 0931 07/28/23 1031   BP: 133/85  122/78 133/81   Pulse:  64 65 68   Resp:       Temp:       SpO2:  97% 98% 96%   Weight:       Height:           Active LDAs/IV Access:   Lines, Drains & Airways       Active LDAs       Name Placement date Placement time Site Days    Peripheral IV 07/28/23 0902 Right Antecubital 07/28/23  0902  Antecubital  less than 1                    Labs (abnormal labs have a star):   Labs Reviewed   COMPREHENSIVE METABOLIC PANEL - Abnormal; Notable for the following components:       Result Value    Glucose 168 (*)     All other components within normal limits    Narrative:     GFR Normal >60  Chronic Kidney Disease <60  Kidney Failure <15     URINALYSIS W/ MICROSCOPIC IF INDICATED (NO CULTURE) - Abnormal; Notable for the following components:    Nitrite, UA Positive (*)     All other components within normal limits   CBC WITH AUTO DIFFERENTIAL - Abnormal; Notable for the following components:    RBC 5.30 (*)     All other components within normal limits   URINALYSIS, MICROSCOPIC ONLY - Abnormal; Notable for the following components:    Bacteria, UA 4+ (*)      All other components within normal limits   LIPASE - Normal   TROPONIN - Normal    Narrative:     High Sensitive Troponin T Reference Range:  <10.0 ng/L- Negative Female for AMI  <15.0 ng/L- Negative Male for AMI  >=10 - Abnormal Female indicating possible myocardial injury.  >=15 - Abnormal Male indicating possible myocardial injury.   Clinicians would have to utilize clinical acumen, EKG, Troponin, and serial changes to determine if it is an Acute Myocardial Infarction or myocardial injury due to an underlying chronic condition.        HIGH SENSITIVITIY TROPONIN T 2HR    Narrative:     High Sensitive Troponin T Reference Range:  <10.0 ng/L- Negative Female for AMI  <15.0 ng/L- Negative Male for AMI  >=10 - Abnormal Female indicating possible myocardial injury.  >=15 - Abnormal Male indicating possible myocardial injury.   Clinicians would have to utilize clinical acumen, EKG, Troponin, and serial changes to determine if it is an Acute Myocardial Infarction or myocardial injury due to an underlying chronic condition.        CBC AND DIFFERENTIAL    Narrative:     The following orders were created for panel order CBC & Differential.  Procedure                               Abnormality         Status                     ---------                               -----------         ------                     CBC Auto Differential[[id number]]        Abnormal            Final result                 Please view results for these tests on the individual orders.       EKG:   ECG 12 Lead Other; upper abd   Preliminary Result   HEART RATE= 61  bpm   RR Interval= 984  ms   ND Interval= 171  ms   P Horizontal Axis= -32  deg   P Front Axis= 49  deg   QRSD Interval= 97  ms   QT Interval= 458  ms   QRS Axis= 18  deg   T Wave Axis= 27  deg   - BORDERLINE ECG -   Sinus rhythm   Probable left atrial enlargement   Borderline repolarization abnormality   Electronically Signed By:    Date and Time of Study: 2023-07-28 08:40:46      ECG  12 Lead Chest Pain    (Results Pending)       Meds given in ED:   Medications   morphine injection 2 mg (2 mg Intravenous Given 7/28/23 0955)   ondansetron (ZOFRAN) injection 8 mg (8 mg Intravenous Given 7/28/23 0954)   sodium chloride 0.9 % bolus 1,000 mL (0 mL Intravenous Stopped 7/28/23 1129)   famotidine (PEPCID) injection 20 mg (20 mg Intravenous Given 7/28/23 0953)   iopamidol (ISOVUE-300) 61 % injection 100 mL (85 mL Intravenous Given by Other 7/28/23 0958)   piperacillin-tazobactam (ZOSYN) 3.375 g in iso-osmotic dextrose 50 ml (premix) (0 g Intravenous Stopped 7/28/23 1201)       Imaging results:  CT Abdomen Pelvis With Contrast    Result Date: 7/28/2023  1. There is likely acute cholecystitis with a 1.5 cm gallstone within the neck of the gallbladder. There is no biliary dilatation. 2. Single 3 mm nonobstructing stone within the right kidney.      XR Chest 1 View    Result Date: 7/28/2023  FINDINGS AND IMPRESSION: No pneumothorax or pulmonary consolidation is seen. Cardiac silhouette is at the upper lungs normal to borderline enlarged in size.  This report was finalized on 7/28/2023 12:24 PM by Dr. Buck Wright M.D.       Ambulatory status:   - assist     Social issues:   Social History     Socioeconomic History    Marital status:    Tobacco Use    Smoking status: Never    Smokeless tobacco: Never   Vaping Use    Vaping Use: Never used   Substance and Sexual Activity    Alcohol use: Yes     Comment: Seldom    Drug use: Yes     Frequency: 7.0 times per week     Types: Marijuana     Comment: CBD gummies     Sexual activity: Not Currently     Partners: Male       NIH Stroke Scale:       Angelika La RN  07/28/23 14:15 EDT

## 2023-07-29 ENCOUNTER — READMISSION MANAGEMENT (OUTPATIENT)
Dept: CALL CENTER | Facility: HOSPITAL | Age: 68
End: 2023-07-29
Payer: MEDICARE

## 2023-07-29 VITALS
RESPIRATION RATE: 16 BRPM | SYSTOLIC BLOOD PRESSURE: 120 MMHG | WEIGHT: 216.93 LBS | HEIGHT: 65 IN | TEMPERATURE: 98.1 F | OXYGEN SATURATION: 96 % | HEART RATE: 84 BPM | BODY MASS INDEX: 36.14 KG/M2 | DIASTOLIC BLOOD PRESSURE: 80 MMHG

## 2023-07-29 LAB
ALBUMIN SERPL-MCNC: 3.9 G/DL (ref 3.5–5.2)
ALBUMIN/GLOB SERPL: 1.5 G/DL
ALP SERPL-CCNC: 56 U/L (ref 39–117)
ALT SERPL W P-5'-P-CCNC: 37 U/L (ref 1–33)
ANION GAP SERPL CALCULATED.3IONS-SCNC: 9.8 MMOL/L (ref 5–15)
AST SERPL-CCNC: 36 U/L (ref 1–32)
BASOPHILS # BLD AUTO: 0.01 10*3/MM3 (ref 0–0.2)
BASOPHILS NFR BLD AUTO: 0.1 % (ref 0–1.5)
BILIRUB SERPL-MCNC: 1 MG/DL (ref 0–1.2)
BUN SERPL-MCNC: 11 MG/DL (ref 8–23)
BUN/CREAT SERPL: 12.1 (ref 7–25)
CALCIUM SPEC-SCNC: 8.9 MG/DL (ref 8.6–10.5)
CHLORIDE SERPL-SCNC: 106 MMOL/L (ref 98–107)
CO2 SERPL-SCNC: 25.2 MMOL/L (ref 22–29)
CREAT SERPL-MCNC: 0.91 MG/DL (ref 0.57–1)
DEPRECATED RDW RBC AUTO: 42.1 FL (ref 37–54)
EGFRCR SERPLBLD CKD-EPI 2021: 69.3 ML/MIN/1.73
EOSINOPHIL # BLD AUTO: 0.01 10*3/MM3 (ref 0–0.4)
EOSINOPHIL NFR BLD AUTO: 0.1 % (ref 0.3–6.2)
ERYTHROCYTE [DISTWIDTH] IN BLOOD BY AUTOMATED COUNT: 13.1 % (ref 12.3–15.4)
GLOBULIN UR ELPH-MCNC: 2.6 GM/DL
GLUCOSE SERPL-MCNC: 167 MG/DL (ref 65–99)
HCT VFR BLD AUTO: 43.4 % (ref 34–46.6)
HGB BLD-MCNC: 14.5 G/DL (ref 12–15.9)
IMM GRANULOCYTES # BLD AUTO: 0.03 10*3/MM3 (ref 0–0.05)
IMM GRANULOCYTES NFR BLD AUTO: 0.4 % (ref 0–0.5)
LYMPHOCYTES # BLD AUTO: 0.68 10*3/MM3 (ref 0.7–3.1)
LYMPHOCYTES NFR BLD AUTO: 8.1 % (ref 19.6–45.3)
MCH RBC QN AUTO: 29.3 PG (ref 26.6–33)
MCHC RBC AUTO-ENTMCNC: 33.4 G/DL (ref 31.5–35.7)
MCV RBC AUTO: 87.7 FL (ref 79–97)
MONOCYTES # BLD AUTO: 0.08 10*3/MM3 (ref 0.1–0.9)
MONOCYTES NFR BLD AUTO: 1 % (ref 5–12)
NEUTROPHILS NFR BLD AUTO: 7.57 10*3/MM3 (ref 1.7–7)
NEUTROPHILS NFR BLD AUTO: 90.3 % (ref 42.7–76)
NRBC BLD AUTO-RTO: 0 /100 WBC (ref 0–0.2)
PLATELET # BLD AUTO: 203 10*3/MM3 (ref 140–450)
PMV BLD AUTO: 9.8 FL (ref 6–12)
POTASSIUM SERPL-SCNC: 3.9 MMOL/L (ref 3.5–5.2)
PROT SERPL-MCNC: 6.5 G/DL (ref 6–8.5)
RBC # BLD AUTO: 4.95 10*6/MM3 (ref 3.77–5.28)
SODIUM SERPL-SCNC: 141 MMOL/L (ref 136–145)
WBC NRBC COR # BLD: 8.38 10*3/MM3 (ref 3.4–10.8)

## 2023-07-29 PROCEDURE — 25010000002 PIPERACILLIN SOD-TAZOBACTAM PER 1 G: Performed by: SURGERY

## 2023-07-29 PROCEDURE — 99024 POSTOP FOLLOW-UP VISIT: CPT | Performed by: SURGERY

## 2023-07-29 PROCEDURE — G0378 HOSPITAL OBSERVATION PER HR: HCPCS

## 2023-07-29 PROCEDURE — 80053 COMPREHEN METABOLIC PANEL: CPT | Performed by: SURGERY

## 2023-07-29 PROCEDURE — 85025 COMPLETE CBC W/AUTO DIFF WBC: CPT | Performed by: SURGERY

## 2023-07-29 RX ORDER — OXYCODONE HYDROCHLORIDE AND ACETAMINOPHEN 5; 325 MG/1; MG/1
1 TABLET ORAL EVERY 4 HOURS PRN
Qty: 12 TABLET | Refills: 0 | Status: SHIPPED | OUTPATIENT
Start: 2023-07-29

## 2023-07-29 RX ADMIN — OXYCODONE HYDROCHLORIDE AND ACETAMINOPHEN 1 TABLET: 5; 325 TABLET ORAL at 13:43

## 2023-07-29 RX ADMIN — SENNOSIDES AND DOCUSATE SODIUM 2 TABLET: 50; 8.6 TABLET ORAL at 08:49

## 2023-07-29 RX ADMIN — PIPERACILLIN SODIUM AND TAZOBACTAM SODIUM 3.38 G: 3; .375 INJECTION, SOLUTION INTRAVENOUS at 04:26

## 2023-07-29 RX ADMIN — POTASSIUM CHLORIDE, DEXTROSE MONOHYDRATE AND SODIUM CHLORIDE 75 ML/HR: 150; 5; 450 INJECTION, SOLUTION INTRAVENOUS at 08:50

## 2023-07-29 RX ADMIN — OXYCODONE HYDROCHLORIDE AND ACETAMINOPHEN 1 TABLET: 5; 325 TABLET ORAL at 02:14

## 2023-07-29 NOTE — ANESTHESIA PROCEDURE NOTES
Airway  Urgency: elective    Date/Time: 7/28/2023 8:24 PM    General Information and Staff    Patient location during procedure: OR  Anesthesiologist: Yocasta Scott MD    Indications and Patient Condition  Indications for airway management: airway protection    Preoxygenated: yes  Mask difficulty assessment: 1 - vent by mask    Final Airway Details  Final airway type: endotracheal airway      Successful airway: ETT  Cuffed: yes   Successful intubation technique: direct laryngoscopy  Blade: Ed  Blade size: 3  ETT size (mm): 7.0  Cormack-Lehane Classification: grade I - full view of glottis  Measured from: teeth  Number of attempts at approach: 1  Assessment: lips, teeth, and gum same as pre-op and atraumatic intubation

## 2023-07-29 NOTE — OUTREACH NOTE
Prep Survey      Flowsheet Row Responses   [facility] patient discharged from? [place]   Is LACE score < 7 ? Yes   Eligibility [facility]   Date of Admission 07/28/23   Date of Discharge 07/29/23   Discharge diagnosis Laparoscopic cholecystectomy with cholangiogram   Does the patient have one of the following disease processes/diagnoses(primary or secondary)? General Surgery   Does the patient have Home health ordered? No   Is there a DME ordered? No   Prep survey completed? Yes            Consuelo VÁZQUEZ - Registered Nurse

## 2023-07-29 NOTE — PLAN OF CARE
Goal Outcome Evaluation:  Plan of Care Reviewed With: patient        Progress: improving  Outcome Evaluation: VSS. came back from surgery at 2230.  lap sites x 3 with dermabond CDI.  IV dilaudid given last night but has started with po narcotics this am.  IV antibiotics given.  voiding freely.  Ambulated in the mata

## 2023-07-29 NOTE — DISCHARGE SUMMARY
Discharge Summary    Patient name: Emerita Matthews    Medical record number: [id number]    Admission date: 7/28/2023  Discharge date:  7/29/2023    Attending physician: Ulysses Ingram    Primary care physician: Josselyn Alex MD    Consulting physician(s): Dorina Rehman    Admitting diagnosis: Acute calculus cholecystitis    Final diagnosis: Acute calculus cholecystitis    Procedures: Laparoscopic cholecystectomy with cholangiogram    History of present illness: The patient is a  67 y.o. female that was admitted to the hospital with acute onset abdominal pain and CT evidence suggesting acute calculus cholecystitis while in the emergency room.    Hospital course: She was evaluated in the emergency room and diagnosed with acute calculus cholecystitis.  She was admitted to the hospitalist team and begun on empiric antibiotics.  I then recommended proceeding to the operating room for laparoscopic cholecystectomy with cholangiogram which was achieved without difficulty.  Postoperatively she was kept overnight for observation and her diet was advanced.  She is stable for discharge home the following morning.    Discharge medications:      Discharge Medications        New Medications        Instructions Start Date   oxyCODONE-acetaminophen 5-325 MG per tablet  Commonly known as: PERCOCET   1 tablet, Oral, Every 4 Hours PRN             Continue These Medications        Instructions Start Date   atorvastatin 10 MG tablet  Commonly known as: Lipitor   10 mg, Oral, Daily, To decrease risk of stroke and heart attack      Calcium 500-2.5 MG-MCG chewable tablet   1 tablet, Oral, Daily      CALCIUM CITRATE PO   Oral      cholecalciferol 25 MCG (1000 UT) tablet  Commonly known as: VITAMIN D3   1,000 Units, Oral, Daily      DULoxetine 30 MG capsule  Commonly known as: CYMBALTA   TAKE 1 CAPSULE EVERY DAY      hydroCHLOROthiazide 12.5 MG tablet  Commonly known as: HYDRODIURIL   12.5 mg, Oral, Daily      Hydrocod Vlad-Chlorphe Vlad  ER 10-8 MG/5ML ER suspension  Commonly known as: Tussionex Pennkinetic ER   5 mL, Oral, Every 12 Hours PRN      loratadine 10 MG tablet  Commonly known as: CLARITIN   TAKE 1 TABLET EVERY DAY      metFORMIN  MG 24 hr tablet  Commonly known as: GLUCOPHAGE-XR   TAKE 1 TABLET EVERY DAY WITH BREAKFAST FOR PREDIABETES      omeprazole 20 MG capsule  Commonly known as: priLOSEC   20 mg, Oral, Daily      VITAMIN D PO   Oral             Stop These Medications      Asmanex  MCG/ACT aerosol  Generic drug: Mometasone Furoate              Discharge instructions:    - Resume regular diet as tolerated.  - No specific activity restrictions post-op. You may resume all activities as tolerated once your pain level allows.  - No driving while taking narcotic pain medications.  - You may resume showering, no tub bathing for two weeks while your incisions heal.  - Wash your incisions with soap and water daily in the shower, pat dry, and leave open to air.    Follow-up appointment: Follow up with Dorina Rehman MD in the office in 2 weeks. Call for appointment at [phone].    CODE STATUS: Full code

## 2023-07-29 NOTE — PROGRESS NOTES
"General Surgery  Progress Note    CC: Follow-up acute calculus cholecystitis    POD#1 laparoscopic cholecystectomy with cholangiogram    S: She feels great this morning with mild abdominal soreness but no nausea or vomiting.  She is tolerating a regular diet.    O:/80 (BP Location: Left arm, Patient Position: Sitting)   Pulse 84   Temp 98.1 °F (36.7 °C) (Oral)   Resp 16   Ht 165.1 cm (65\")   Wt 98.4 kg (216 lb 14.9 oz)   LMP  (LMP Unknown)   SpO2 96%   BMI 36.10 kg/m²     GENERAL: alert, well appearing, and in no distress  HEENT: normocephalic, atraumatic, moist mucous membranes, clear sclerae   CHEST: clear to auscultation, no wheezes, rales or rhonchi, symmetric air entry  CARDIAC: regular rate and rhythm    ABDOMEN: Soft, nondistended, incisions clean/dry/intact with glue, mild incisional tenderness  EXTREMITIES: no cyanosis, clubbing, or edema   SKIN: Warm and moist, no rashes    LABS  Results from last 7 days   Lab Units 07/29/23  0426 07/28/23  0859 07/26/23  1005   WBC 10*3/mm3 8.38 7.66 9.98   HEMOGLOBIN g/dL 14.5 15.4 15.6   HEMATOCRIT % 43.4 46.1 46.5   PLATELETS 10*3/mm3 203 235 244     Results from last 7 days   Lab Units 07/29/23  0426 07/28/23  0859 07/26/23  1005   SODIUM mmol/L 141 143 142   POTASSIUM mmol/L 3.9 3.6 4.3   CHLORIDE mmol/L 106 103 102   CO2 mmol/L 25.2 28.0 26.6   BUN mg/dL 11 18 18   CREATININE mg/dL 0.91 0.91 0.85   CALCIUM mg/dL 8.9 9.9 9.8   BILIRUBIN mg/dL 1.0 0.6 0.5   ALK PHOS U/L 56 66 62   ALT (SGPT) U/L 37* 16 15   AST (SGOT) U/L 36* 15 17   GLUCOSE mg/dL 167* 168* 119*             A/P: 67 y.o. female POD#1 laparoscopic cholecystectomy with cholangiogram    She may discharged home today.  I have sent a prescription for Percocet to her pharmacy and went over discharge instructions regarding diet, activity, bathing, and driving.  She knows to call my office to schedule a postoperative follow-up with me in 2 weeks.  The mild elevation of her LFTs today is due to " the gallbladder dissection off of the liver yesterday and is of no clinical concern.    Dorina Rehman MD  General, Robotic, and Endoscopic Surgery  [facility]    [address], [address]  [place] [address]  P: [phone]  F: [phone]

## 2023-07-29 NOTE — ANESTHESIA POSTPROCEDURE EVALUATION
"Patient: Emerita Matthews    Procedure Summary       Date: 07/28/23 Room / Location: [facility] OR 09 / [facility] MAIN OR    Anesthesia Start: 2010 Anesthesia Stop: 2139    Procedure: Laparoscopic cholecystectomy with cholangiogram (Abdomen) Diagnosis:       Acute calculous cholecystitis      (Acute calculous cholecystitis [K80.00])    Surgeons: Dorina Rehman MD Provider: Yocasta Scott MD    Anesthesia Type: general ASA Status: 2            Anesthesia Type: general    Vitals  Vitals Value Taken Time   /82 07/28/23 2215   Temp 36.8 °C (98.3 °F) 07/28/23 2135   Pulse 82 07/28/23 2215   Resp 16 07/28/23 2215   SpO2 97 % 07/28/23 2215           Post Anesthesia Care and Evaluation    Patient location during evaluation: bedside  Patient participation: complete - patient participated  Level of consciousness: awake and alert  Pain score: 0  Pain management: adequate    Airway patency: patent  Anesthetic complications: No anesthetic complications    Cardiovascular status: acceptable  Respiratory status: acceptable  Hydration status: acceptable    Comments: /82 (BP Location: Left arm, Patient Position: Lying)   Pulse 82   Temp 36.8 °C (98.3 °F) (Oral)   Resp 16   Ht 165.1 cm (65\")   Wt 98.4 kg (216 lb 14.9 oz)   LMP  (LMP Unknown)   SpO2 97%   BMI 36.10 kg/m²     "

## 2023-07-29 NOTE — OP NOTE
Operative Note :  Dorina Rehman MD    Emerita Matthews  1955    Procedure Date: 07/28/23    Pre-op Diagnosis:  Acute calculous cholecystitis [K80.00]    Post-op Diagnosis:  Acute calculous cholecystitis [K80.00]    Procedure:   Laparoscopic cholecystectomy with cholangiogram    Surgeon: Dorina Rehman MD    Assistant: SHANITA Guerrero (Breanna was responsible for laparoscopic manipulation of the gallbladder during critical portions of the dissection, handling of the laparoscopic camera, closure of all surgical incisions, and application of topical sterile dressings)    Anesthesia:  General (general endotracheal tube)    Estimated Blood Loss: 100ml    Specimens:  Gallbladder    Complications: None    Indications: The patient is a 67-year-old lady who came to the hospital today with acute onset abdominal pain and CT evidence of acute calculus cholecystitis.  I have recommended proceeding to the operating today for laparoscopic cholecystectomy with cholangiogram.  She understands the risks of the procedure to include bleeding, wound infection, common bile duct injury, and postoperative bile leak.  Despite these risks, she has consented to proceed.    Findings: Normal cholangiogram, tense and distended gallbladder consistent with acute cholecystitis    Description of procedure:  The patient was brought to the operating room and placed on the OR table in supine position.  An endotracheal tube was inserted, and general anesthesia was induced.  The anterior abdominal wall was shaved, prepped, and draped in a sterile fashion.  A surgical timeout was then completed.  A curvilinear infraumbilical skin incision was made after instillation of 0.5% Marcaine with epinephrine.  The umbilical stalk was grasped and elevated, and a longitudinal fasciotomy made.  While incising the fascia, there was brisk bleeding from the rectus muscles beneath and it appeared as though I had pierced the right inferior epigastric artery.  This  was cauterized to obtain hemostasis.  I then made a separate incision closer to midline and a Lester trocar was inserted and secured with 2 separate 0 Vicryl stay sutures.  The abdomen was then insufflated.  Under direct visualization, 3 additional 5 mm trocars were then placed within the subxiphoid and subcostal space on the right.  The gallbladder was markedly distended, tense, and acutely inflamed.  The fundus of the gallbladder was retracted cephalad and infundibulum retracted inferiorly. The cystic duct and cystic artery were slowly dissected out circumferentially until a firm critical view was obtained.  A single Hemoclip was placed across the cystic duct at its junction with the gallbladder infundibulum and 3 hemoclips were placed across the cystic artery.  A small hole was created on the anterior wall of the cystic duct, and a cholangiogram catheter inserted through a right upper quadrant angiocatheter.  The catheter was secured within the duct with an additional Hemoclip and a cholangiogram was then obtained.  The cholangiogram showed filling of the cystic duct and common bile duct, retrograde filling of the intrahepatic ducts, and easy spillage into the duodenum with no filling defects appreciable.  The catheter was then withdrawn and 2 additional hemoclips placed across the cystic duct.  The cystic duct and artery were then transected, leaving 2 clips behind on both of the stumps.  The gallbladder was then slowly dissected off of the undersurface of the liver using electrocautery and it was removed from the peritoneal cavity within an Endo Catch bag at the umbilical trocar site.  The gallbladder was passed off to pathology.  The abdomen was then reinsufflated and right upper quadrant inspected. Warm normal saline was irrigated and suctioned dry.  The gallbladder fossa appeared hemostatic.  All trocars were then removed under direct visualization and the abdomen desufflated.  The umbilical fascial incision  was closed using a new 0 Vicryl figure-of-eight suture, all skin incisions closed with 4-0 Vicryl subcuticular stitches, and then each was dressed with Exofin glue.  The patient was then extubated and transferred to PACU in stable condition.  All counts were correct per nursing.    Recommendations:  She can be discharged to home tomorrow as long as she tolerates a regular diet.  I will keep her on empiric antibiotics overnight, but her antibiotics can be discontinued prior to discharge to home.    Dorina Rehman MD  General, Robotic, and Endoscopic Surgery  [facility]    [address], [address]  [place] [address]  P: [phone]  F: [phone]

## 2023-07-31 ENCOUNTER — TELEPHONE (OUTPATIENT)
Dept: FAMILY MEDICINE CLINIC | Facility: CLINIC | Age: 68
End: 2023-07-31

## 2023-07-31 ENCOUNTER — TRANSITIONAL CARE MANAGEMENT TELEPHONE ENCOUNTER (OUTPATIENT)
Dept: CALL CENTER | Facility: HOSPITAL | Age: 68
End: 2023-07-31
Payer: MEDICARE

## 2023-07-31 ENCOUNTER — TELEPHONE (OUTPATIENT)
Dept: SURGERY | Facility: CLINIC | Age: 68
End: 2023-07-31
Payer: MEDICARE

## 2023-07-31 NOTE — CASE MANAGEMENT/SOCIAL WORK
Case Management Discharge Note      Final Note: dc'd home         Selected Continued Care - Discharged on 7/29/2023 Admission date: 7/28/2023 - Discharge disposition: Home or Self Care      Destination    No services have been selected for the patient.                Durable Medical Equipment    No services have been selected for the patient.                Dialysis/Infusion    No services have been selected for the patient.                Home Medical Care    No services have been selected for the patient.                Therapy    No services have been selected for the patient.                Community Resources    No services have been selected for the patient.                Community & DME    No services have been selected for the patient.                    Transportation Services  Private: Car    Final Discharge Disposition Code: 01 - home or self-care

## 2023-07-31 NOTE — TELEPHONE ENCOUNTER
Caller: Emerita Matthews    Relationship: Self    Best call back number: [phone]    What was the call regarding: PATIENT WANTED TO LET DR CARLISLE KNOW SHE CANCELED HER APPOINTMENT FOR TOMORROW 08/01/23.    SHE STATED SHE JUST HAD HER GALLBLADDER REMOVED.

## 2023-08-01 LAB
LAB AP CASE REPORT: NORMAL
PATH REPORT.FINAL DX SPEC: NORMAL
PATH REPORT.GROSS SPEC: NORMAL

## 2023-08-11 ENCOUNTER — OFFICE VISIT (OUTPATIENT)
Dept: SURGERY | Facility: CLINIC | Age: 68
End: 2023-08-11
Payer: MEDICARE

## 2023-08-11 VITALS — HEART RATE: 81 BPM | OXYGEN SATURATION: 97 % | DIASTOLIC BLOOD PRESSURE: 80 MMHG | SYSTOLIC BLOOD PRESSURE: 118 MMHG

## 2023-08-11 DIAGNOSIS — Z09 SURGICAL FOLLOWUP: Primary | ICD-10-CM

## 2023-08-11 NOTE — PROGRESS NOTES
CHIEF COMPLAINT:   Chief Complaint   Patient presents with    Post-op     Laparoscopic cholecystectomy with cholangiogram        HISTORY OF PRESENT ILLNESS:  This is a 68 y.o. female who presents for a post-operative visit after undergoing laparoscopic cholecystectomy with cholangiogram on 7/28/2023.  She has been doing very well since surgery with no fever, chills, jaundice, or scleral icterus.  She denies any postprandial diarrhea, other than once when she ate cottage cheese the other day.  She has had other fried foods and fatty foods with no resulting diarrhea.    Pathology:   Gallbladder, Cholecystectomy:               A. Acute cholecystitis and cholelithiasis    PHYSICAL EXAM:  Lungs: Clear  Heart: RRR  ABD: Incisions are healing well without any erythema or signs of infection.  Ext: no significant edema, calves nontender    A/P:  This is a 68 y.o. female patient who is S/P laparoscopic cholecystectomy with cholangiogram on 7/28/2023    She is healing beautifully.  I discussed the benign pathology findings with her.  She can follow-up with me on an as-needed basis and return to all activities as tolerated.    Dorina Rehman MD  General, Robotic, and Endoscopic Surgery  [facility]    [address], [address]  [place] [address]  P: [phone]  F: [phone]

## 2023-08-23 NOTE — TELEPHONE ENCOUNTER
Rx Refill Note  Requested Prescriptions     Pending Prescriptions Disp Refills    loratadine (CLARITIN) 10 MG tablet [Pharmacy Med Name: LORATADINE 10 MG Tablet] 90 tablet 0     Sig: TAKE 1 TABLET EVERY DAY      Last office visit with prescribing clinician: 6/7/2023   Last telemedicine visit with prescribing clinician: Visit date not found   Next office visit with prescribing clinician: 12/12/2023                         Would you like a call back once the refill request has been completed: [] Yes [] No    If the office needs to give you a call back, can they leave a voicemail: [] Yes [] No    Roverto Davis MA  08/23/23, 11:27 EDT

## 2023-08-25 RX ORDER — LORATADINE 10 MG/1
TABLET ORAL
Qty: 90 TABLET | Refills: 0 | Status: SHIPPED | OUTPATIENT
Start: 2023-08-25

## 2023-11-04 ENCOUNTER — HOSPITAL ENCOUNTER (EMERGENCY)
Facility: HOSPITAL | Age: 68
Discharge: HOME | End: 2023-11-04
Attending: EMERGENCY MEDICINE
Payer: MEDICARE

## 2023-11-04 ENCOUNTER — APPOINTMENT (OUTPATIENT)
Dept: CT IMAGING | Facility: HOSPITAL | Age: 68
End: 2023-11-04
Payer: MEDICARE

## 2023-11-04 VITALS
HEIGHT: 65 IN | RESPIRATION RATE: 14 BRPM | WEIGHT: 220 LBS | DIASTOLIC BLOOD PRESSURE: 81 MMHG | SYSTOLIC BLOOD PRESSURE: 124 MMHG | BODY MASS INDEX: 36.65 KG/M2 | TEMPERATURE: 98.7 F | HEART RATE: 84 BPM | OXYGEN SATURATION: 98 %

## 2023-11-04 DIAGNOSIS — K43.9 VENTRAL HERNIA WITHOUT OBSTRUCTION OR GANGRENE: Primary | ICD-10-CM

## 2023-11-04 LAB
ALBUMIN SERPL-MCNC: 4.2 G/DL (ref 3.5–5.2)
ALBUMIN/GLOB SERPL: 1.6 G/DL
ALP SERPL-CCNC: 60 U/L (ref 39–117)
ALT SERPL W P-5'-P-CCNC: 18 U/L (ref 1–33)
ANION GAP SERPL CALCULATED.3IONS-SCNC: 9 MMOL/L (ref 5–15)
AST SERPL-CCNC: 16 U/L (ref 1–32)
BASOPHILS # BLD AUTO: 0.02 10*3/MM3 (ref 0–0.2)
BASOPHILS NFR BLD AUTO: 0.2 % (ref 0–1.5)
BILIRUB SERPL-MCNC: 0.3 MG/DL (ref 0–1.2)
BUN SERPL-MCNC: 20 MG/DL (ref 8–23)
BUN/CREAT SERPL: 20.8 (ref 7–25)
CALCIUM SPEC-SCNC: 9.9 MG/DL (ref 8.6–10.5)
CHLORIDE SERPL-SCNC: 106 MMOL/L (ref 98–107)
CO2 SERPL-SCNC: 29 MMOL/L (ref 22–29)
CREAT SERPL-MCNC: 0.96 MG/DL (ref 0.57–1)
DEPRECATED RDW RBC AUTO: 45 FL (ref 37–54)
EGFRCR SERPLBLD CKD-EPI 2021: 64.6 ML/MIN/1.73
EOSINOPHIL # BLD AUTO: 0.24 10*3/MM3 (ref 0–0.4)
EOSINOPHIL NFR BLD AUTO: 2.7 % (ref 0.3–6.2)
ERYTHROCYTE [DISTWIDTH] IN BLOOD BY AUTOMATED COUNT: 13.3 % (ref 12.3–15.4)
GLOBULIN UR ELPH-MCNC: 2.7 GM/DL
GLUCOSE SERPL-MCNC: 109 MG/DL (ref 65–99)
HCT VFR BLD AUTO: 42.9 % (ref 34–46.6)
HGB BLD-MCNC: 14 G/DL (ref 12–15.9)
IMM GRANULOCYTES # BLD AUTO: 0.02 10*3/MM3 (ref 0–0.05)
IMM GRANULOCYTES NFR BLD AUTO: 0.2 % (ref 0–0.5)
LIPASE SERPL-CCNC: 26 U/L (ref 13–60)
LYMPHOCYTES # BLD AUTO: 3.39 10*3/MM3 (ref 0.7–3.1)
LYMPHOCYTES NFR BLD AUTO: 38 % (ref 19.6–45.3)
MCH RBC QN AUTO: 29.4 PG (ref 26.6–33)
MCHC RBC AUTO-ENTMCNC: 32.6 G/DL (ref 31.5–35.7)
MCV RBC AUTO: 90.1 FL (ref 79–97)
MONOCYTES # BLD AUTO: 0.7 10*3/MM3 (ref 0.1–0.9)
MONOCYTES NFR BLD AUTO: 7.8 % (ref 5–12)
NEUTROPHILS NFR BLD AUTO: 4.56 10*3/MM3 (ref 1.7–7)
NEUTROPHILS NFR BLD AUTO: 51.1 % (ref 42.7–76)
NRBC BLD AUTO-RTO: 0 /100 WBC (ref 0–0.2)
PLATELET # BLD AUTO: 231 10*3/MM3 (ref 140–450)
PMV BLD AUTO: 9.7 FL (ref 6–12)
POTASSIUM SERPL-SCNC: 3.8 MMOL/L (ref 3.5–5.2)
PROT SERPL-MCNC: 6.9 G/DL (ref 6–8.5)
RBC # BLD AUTO: 4.76 10*6/MM3 (ref 3.77–5.28)
SODIUM SERPL-SCNC: 144 MMOL/L (ref 136–145)
WBC NRBC COR # BLD: 8.93 10*3/MM3 (ref 3.4–10.8)

## 2023-11-04 PROCEDURE — 80053 COMPREHEN METABOLIC PANEL: CPT | Performed by: EMERGENCY MEDICINE

## 2023-11-04 PROCEDURE — 99285 EMERGENCY DEPT VISIT HI MDM: CPT

## 2023-11-04 PROCEDURE — 85025 COMPLETE CBC W/AUTO DIFF WBC: CPT | Performed by: EMERGENCY MEDICINE

## 2023-11-04 PROCEDURE — 83690 ASSAY OF LIPASE: CPT | Performed by: EMERGENCY MEDICINE

## 2023-11-04 PROCEDURE — 25510000001 IOPAMIDOL 61 % SOLUTION: Performed by: EMERGENCY MEDICINE

## 2023-11-04 PROCEDURE — 74177 CT ABD & PELVIS W/CONTRAST: CPT

## 2023-11-04 RX ORDER — SODIUM CHLORIDE 0.9 % (FLUSH) 0.9 %
10 SYRINGE (ML) INJECTION AS NEEDED
Status: DISCONTINUED | OUTPATIENT
Start: 2023-11-04 | End: 2023-11-04 | Stop reason: HOSPADM

## 2023-11-04 RX ADMIN — IOPAMIDOL 85 ML: 612 INJECTION, SOLUTION INTRAVENOUS at 20:31

## 2023-11-04 NOTE — ED PROVIDER NOTES
EMERGENCY DEPARTMENT ENCOUNTER    Room Number:  21/21  PCP: Josselyn Alex MD  Historian: Patient      HPI:  Chief Complaint: Abdominal swelling  A complete HPI/ROS/PMH/PSH/SH/FH are unobtainable due to: Nothing  Context: Emerita Matthews is a 68 y.o. female who presents to the ED c/o swelling of her right side of the abdomen that she noted last night.  She denies associated pain.  No nausea or vomiting.  No fever or chills.  She has had regular bowel movements.  She states that she had gallbladder surgery with Dr. Rehman back in July and had recovered well.            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    History of bacteremia 08/21/2019    Seasonal allergies 08/07/2020    Acute lower GI bleeding 05/04/2021    Asthma     COVID-19 virus infection 12/30/2021    Acute calculous cholecystitis 07/28/2023     Resolved Ambulatory Problems     Diagnosis Date Noted    Anemia 08/02/2016     Past Medical History:   Diagnosis Date    Allergic rhinitis     Fatty liver     Headache, tension-type     Kidney stones 08/01/2019    Memory loss     Obesity 1975         PAST SURGICAL HISTORY  Past Surgical History:   Procedure Laterality Date    CHOLECYSTECTOMY WITH INTRAOPERATIVE CHOLANGIOGRAM N/A 7/28/2023    Procedure: Laparoscopic cholecystectomy with cholangiogram;  Surgeon: Dorina Rehman MD;  Location[facility];  Service: General;  Laterality: N/A;    COLONOSCOPY N/A 2016    NBIH otherwise normal-Dr. Hull    DENTAL PROCEDURE      implants    DILATATION AND CURETTAGE  1988    EYE SURGERY  2021    KIDNEY STONE SURGERY      TUBAL ABDOMINAL LIGATION  1997    WISDOM TOOTH  "EXTRACTION           FAMILY HISTORY  Family History   Problem Relation Age of Onset    COPD Mother     Macular degeneration Mother     Arthritis Mother     Cancer Mother 86        bladder    Other Father         Progressive supranuclear palsy    Parkinsonism Brother     Breast cancer Maternal Grandmother     Cancer Maternal Grandmother         breast    Arthritis Paternal Grandmother     Gallbladder disease Daughter     Other Brother         Parkinson’s         SOCIAL HISTORY  Social History     Socioeconomic History    Marital status:    Tobacco Use    Smoking status: Never    Smokeless tobacco: Never   Vaping Use    Vaping Use: Never used   Substance and Sexual Activity    Alcohol use: Yes     Comment: Seldom    Drug use: Yes     Frequency: 7.0 times per week     Types: Marijuana     Comment: CBD gummies / \"few tokes at night before we go to bed\"    Sexual activity: Not Currently     Partners: Male         ALLERGIES  Patient has no known allergies.        REVIEW OF SYSTEMS  Review of Systems   Review of all 14 systems is negative other than stated in the HPI above.      PHYSICAL EXAM  ED Triage Vitals   Temp Heart Rate Resp BP SpO2   11/04/23 1856 11/04/23 1856 11/04/23 1856 11/04/23 1904 11/04/23 1856   98.7 °F (37.1 °C) 99 16 141/97 98 %      Temp src Heart Rate Source Patient Position BP Location FiO2 (%)   11/04/23 1856 11/04/23 1856 11/04/23 1904 11/04/23 1904 --   Tympanic Monitor Lying Right arm        Physical Exam      GENERAL: Awake and alert, no acute distress  HENT: nares patent  EYES: no scleral icterus, EOMI  CV: regular rhythm, normal rate  RESPIRATORY: normal effort  ABDOMEN: soft, moderate size right periumbilical ventral hernia that is reducible and nontender to touch.  MUSCULOSKELETAL: no deformity  NEURO: alert, moves all extremities, follows commands  PSYCH:  calm, cooperative  SKIN: warm, dry    Vital signs and nursing notes reviewed.          LAB RESULTS  Recent Results (from the past " 24 hour(s))   Comprehensive Metabolic Panel    Collection Time: 11/04/23  7:14 PM    Specimen: Blood   Result Value Ref Range    Glucose 109 (H) 65 - 99 mg/dL    BUN 20 8 - 23 mg/dL    Creatinine 0.96 0.57 - 1.00 mg/dL    Sodium 144 136 - 145 mmol/L    Potassium 3.8 3.5 - 5.2 mmol/L    Chloride 106 98 - 107 mmol/L    CO2 29.0 22.0 - 29.0 mmol/L    Calcium 9.9 8.6 - 10.5 mg/dL    Total Protein 6.9 6.0 - 8.5 g/dL    Albumin 4.2 3.5 - 5.2 g/dL    ALT (SGPT) 18 1 - 33 U/L    AST (SGOT) 16 1 - 32 U/L    Alkaline Phosphatase 60 39 - 117 U/L    Total Bilirubin 0.3 0.0 - 1.2 mg/dL    Globulin 2.7 gm/dL    A/G Ratio 1.6 g/dL    BUN/Creatinine Ratio 20.8 7.0 - 25.0    Anion Gap 9.0 5.0 - 15.0 mmol/L    eGFR 64.6 >60.0 mL/min/1.73   Lipase    Collection Time: 11/04/23  7:14 PM    Specimen: Blood   Result Value Ref Range    Lipase 26 13 - 60 U/L   CBC Auto Differential    Collection Time: 11/04/23  7:14 PM    Specimen: Blood   Result Value Ref Range    WBC 8.93 3.40 - 10.80 10*3/mm3    RBC 4.76 3.77 - 5.28 10*6/mm3    Hemoglobin 14.0 12.0 - 15.9 g/dL    Hematocrit 42.9 34.0 - 46.6 %    MCV 90.1 79.0 - 97.0 fL    MCH 29.4 26.6 - 33.0 pg    MCHC 32.6 31.5 - 35.7 g/dL    RDW 13.3 12.3 - 15.4 %    RDW-SD 45.0 37.0 - 54.0 fl    MPV 9.7 6.0 - 12.0 fL    Platelets 231 140 - 450 10*3/mm3    Neutrophil % 51.1 42.7 - 76.0 %    Lymphocyte % 38.0 19.6 - 45.3 %    Monocyte % 7.8 5.0 - 12.0 %    Eosinophil % 2.7 0.3 - 6.2 %    Basophil % 0.2 0.0 - 1.5 %    Immature Grans % 0.2 0.0 - 0.5 %    Neutrophils, Absolute 4.56 1.70 - 7.00 10*3/mm3    Lymphocytes, Absolute 3.39 (H) 0.70 - 3.10 10*3/mm3    Monocytes, Absolute 0.70 0.10 - 0.90 10*3/mm3    Eosinophils, Absolute 0.24 0.00 - 0.40 10*3/mm3    Basophils, Absolute 0.02 0.00 - 0.20 10*3/mm3    Immature Grans, Absolute 0.02 0.00 - 0.05 10*3/mm3    nRBC 0.0 0.0 - 0.2 /100 WBC       Ordered the above labs and reviewed the results.        RADIOLOGY  CT Abdomen Pelvis With Contrast    Result Date:  11/4/2023  CT ABDOMEN PELVIS W CONTRAST-  Radiation dose reduction techniques were utilized, including automated exposure control and exposure modulation based on body size.  Clinical: Ventral hernia  COMPARISON 7/28/2023  FINDINGS: There has been interval development of a right-sided periumbilical ventral hernia containing only mesenteric fat. There is subtle edema at this location.  No biliary duct dilatation status post cholecystectomy. The liver, pancreas, spleen and adrenal glands are satisfactory in appearance. 11 mm splenic artery aneurysm is again suggested. Both kidneys demonstrate a normal pattern of enhancement without obstructive uropathy or mass, there is a tiny 2 mm right renal calculus. Right and left ureters are normal, the urinary bladder is satisfactory in appearance.  Uterus and ovaries are normal, no free fluid within the pelvis.  Caliber of the large and small bowel is within normal limits. No wall thickening seen. No free air.  CONCLUSION: Right periumbilical hernia has developed since 7/28/2023, only mesenteric fat within with mild edema.  This report was finalized on 11/4/2023 8:55 PM by Dr. Dorian Vasquez M.D on Workstation: [id number]       Ordered the above noted radiological studies. Reviewed by me in PACS.            PROCEDURES  Procedures              MEDICATIONS GIVEN IN ER  Medications   sodium chloride 0.9 % flush 10 mL (has no administration in time range)   iopamidol (ISOVUE-300) 61 % injection 100 mL (85 mL Intravenous Given 11/4/23 2031)                   MEDICAL DECISION MAKING, PROGRESS, and CONSULTS    All labs have been independently reviewed by me.  All radiology studies have been reviewed by me and I have also reviewed the radiology report.   EKG's independently viewed and interpreted by me.  Discussion below represents my analysis of pertinent findings related to patient's condition, differential diagnosis, treatment plan and final disposition.        Orders placed during  this visit:  Orders Placed This Encounter   Procedures    CT Abdomen Pelvis With Contrast    Comprehensive Metabolic Panel    Lipase    CBC Auto Differential    Ambulatory Referral to General Surgery (All Except Andi)    Insert Peripheral IV    CBC & Differential           Differential diagnosis includes but is not limited to:    Ventral hernia  Small bowel obstruction        Independent interpretation of labs, radiology studies, and discussions with consultants:  ED Course as of 11/04/23 2309   Sat Nov 04, 2023 2007 Creatinine: 0.96 [JR]   2007 WBC: 8.93 [JR]   2043 CT abdomen pelvis independently interpreted in PACS.  There is a fat-containing ventral hernia, no appreciable stranding of the fat.  No evidence of small bowel obstruction. [JR]   2114 Patient updated on CT findings and plan for discharge home with outpatient surgery follow-up.  I recommended that she could try an abdominal binder.  I discussed return precautions in detail. [JR]      ED Course User Index  [JR] Aaron Arevalo MD             DIAGNOSIS  Final diagnoses:   Ventral hernia without obstruction or gangrene         DISPOSITION  DISCHARGE    Patient discharged in stable condition.    Reviewed implications of results, diagnosis, meds, responsibility to follow up, warning signs and symptoms of possible worsening, potential complications and reasons to return to ER.    Patient/Family voiced understanding of above instructions.    Discussed plan for discharge, as there is no emergent indication for admission. Patient referred to primary care provider for BP management due to today's BP. Pt/family is agreeable and understands need for follow up and repeat testing.  Pt is aware that discharge does not mean that nothing is wrong but it indicates no emergency is present that requires admission and they must continue care with follow-up as given below or physician of their choice.     FOLLOW-UP  Dorina Rehman MD  [address]  [facility]  [address] [place] [id number]  [phone]    Schedule an appointment as soon as possible for a visit            Medication List      No changes were made to your prescriptions during this visit.                   Latest Documented Vital Signs:  As of 23:09 EDT  BP- 124/81 HR- 84 Temp- 98.7 °F (37.1 °C) (Tympanic) O2 sat- 98%              --    Please note that portions of this were completed with a voice recognition program.       Note Disclaimer: At [facility], we believe that sharing information builds trust and better relationships. You are receiving this note because you are receiving care at [facility] or recently visited. It is possible you will see health information before a provider has talked with you about it. This kind of information can be easy to misunderstand. To help you fully understand what it means for your health, we urge you to discuss this note with your provider.             Aaron Arevalo MD  11/04/23 1177

## 2023-11-09 ENCOUNTER — OFFICE VISIT (OUTPATIENT)
Dept: SURGERY | Facility: CLINIC | Age: 68
End: 2023-11-09
Payer: MEDICARE

## 2023-11-09 VITALS
HEIGHT: 65 IN | SYSTOLIC BLOOD PRESSURE: 125 MMHG | BODY MASS INDEX: 35.89 KG/M2 | DIASTOLIC BLOOD PRESSURE: 78 MMHG | WEIGHT: 215.4 LBS

## 2023-11-09 DIAGNOSIS — K43.9 VENTRAL HERNIA WITHOUT OBSTRUCTION OR GANGRENE: Primary | ICD-10-CM

## 2023-11-09 PROCEDURE — 99213 OFFICE O/P EST LOW 20 MIN: CPT | Performed by: SURGERY

## 2023-11-09 PROCEDURE — 1159F MED LIST DOCD IN RCRD: CPT | Performed by: SURGERY

## 2023-11-09 PROCEDURE — 1160F RVW MEDS BY RX/DR IN RCRD: CPT | Performed by: SURGERY

## 2023-11-09 NOTE — PROGRESS NOTES
General Surgery  Established Patient Office Visit    CC: Abdominal wall hernia    HPI: The patient is a pleasant 68 y.o. year-old lady who presents today for evaluation as periumbilical abdominal hernia that she noticed suddenly on Friday of last week.  She felt sudden stabbing pain just to the right of her umbilicus with a noted swelling and came to the emergency room where a CT abdomen/pelvis demonstrated a fat-containing periumbilical hernia, new from her last CT earlier this year when she came to the ER with acute calculus cholecystitis and required cholecystectomy.  The hernia has been intermittently painful since she left the ER on Friday and at times feels more swollen but at other times she is able to reduce it.  She denies any nausea, vomiting, fever, chills, or overlying skin erythema.    Past Medical History:  Asthma  Kidney stones  Hypertension  Type 2 diabetes     Past Surgical History:  Colonoscopy (2016, Dr. Hull)  Dilation and curettage  Bivins tooth extraction  Tubal ligation  Lithotripsy  Laparoscopic cholecystectomy (July 2023 by me)    Medications:   Atorvastatin 10 mg daily  Calcium citrate once daily  Vitamin D3 1000 units daily  Duloxetine 30 mg daily  Hydrochlorothiazide 12.5 mg daily  Loratadine 10 mg daily  Metformin  mg daily    Allergies: No known drug allergies    Social History: , non-smoker, rare alcohol use, consistently uses marijuana products nightly     Family History: Mother with history of COPD and bladder cancer, father with progressive supranuclear palsy, maternal grandmother with history of breast cancer, daughter with history of gallbladder disease    ROS:   Constitutional: Negative for fevers or chills  HENT: Negative for hearing loss or runny nose  Eyes: Negative for vision changes or scleral icterus  Respiratory: Negative for cough or shortness of breath  Cardiovascular: Negative for chest pain or heart palpitations  Gastrointestinal: Positive for abdominal  pain; negative for abdominal distension, nausea, vomiting, constipation, melena, or hematochezia  Genitourinary: Negative for hematuria or dysuria  Musculoskeletal: Negative for joint swelling or gait instability  Neurologic: Negative for tremors or seizures  Psychiatric: Negative for suicidal ideations or agitation  All other systems reviewed and negative    Physical Exam:  Height: 165 cm  Weight: 97.7 kg  BMI: 35.84  General: No acute distress, well-nourished & well-developed  HEAD: normocephalic, atraumatic  EYES: normal conjunctiva, sclera anicteric  EARS: grossly normal hearing  NECK: supple, no thyromegaly  CARDIOVASCULAR: regular rate and rhythm  RESPIRATORY: clear to auscultation bilaterally  GASTROINTESTINAL: soft, nontender, non-distended, partially incarcerated fat-containing periumbilical hernia just to the right of the umbilicus that is mildly tender to palpation without overlying skin erythema  MUSCULOSKELETAL: normal gait and station. No gross extremity abnormalities  PSYCHIATRIC: oriented x3, normal mood and affect    IMAGING:  CT ABD/PELVIS:  CONCLUSION: Right periumbilical hernia has developed since 7/28/2023, only mesenteric fat within with mild edema.    ASSESSMENT & PLAN  Ms. Matthews is a 68-year-old lady with a fat-containing periumbilical hernia.  I reviewed her recent CT scan and showed her the images today.  I would recommend scheduling her for open periumbilical hernia repair with mesh at her convenience.  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  She has a busy season coming up with the Thanksgiving and [place] holidays and would like to postpone surgery until after Christmas if possible.  I advised her to be cautious about heavy lifting in the interim and to let me know if she ever develops any severe abdominal pain that does  not improve with rest, as this might suggest bowel involvement with incarceration versus strangulation.  She expressed understanding.  She understands the risks of surgery include bleeding, wound infection, and possible hernia recurrence.  Despite these risks, she has consented to proceed.    Dorina Rehman MD  General, Robotic, and Endoscopic Surgery  [facility]    [address], [address]  [place] [address]  P: [phone]  F: [phone]

## 2023-12-12 ENCOUNTER — OFFICE VISIT (OUTPATIENT)
Dept: FAMILY MEDICINE CLINIC | Facility: CLINIC | Age: 68
End: 2023-12-12
Payer: MEDICARE

## 2023-12-12 VITALS
WEIGHT: 216.9 LBS | TEMPERATURE: 97.1 F | BODY MASS INDEX: 36.14 KG/M2 | OXYGEN SATURATION: 98 % | HEIGHT: 65 IN | SYSTOLIC BLOOD PRESSURE: 102 MMHG | HEART RATE: 87 BPM | DIASTOLIC BLOOD PRESSURE: 74 MMHG

## 2023-12-12 DIAGNOSIS — E55.9 VITAMIN D DEFICIENCY: Primary | ICD-10-CM

## 2023-12-12 DIAGNOSIS — Z12.31 ENCOUNTER FOR SCREENING MAMMOGRAM FOR BREAST CANCER: ICD-10-CM

## 2023-12-12 DIAGNOSIS — E78.2 MIXED HYPERLIPIDEMIA: ICD-10-CM

## 2023-12-12 DIAGNOSIS — I10 ESSENTIAL HYPERTENSION: ICD-10-CM

## 2023-12-12 DIAGNOSIS — R73.03 PREDIABETES: ICD-10-CM

## 2023-12-12 RX ORDER — DULOXETIN HYDROCHLORIDE 60 MG/1
60 CAPSULE, DELAYED RELEASE ORAL DAILY
Qty: 90 CAPSULE | Refills: 0 | Status: SHIPPED | OUTPATIENT
Start: 2023-12-12

## 2023-12-12 NOTE — PROGRESS NOTES
"Chief Complaint  Follow-up (6 month follow up Vitamin D deficiency.)    Subjective        Emerita Matthews presents to [facility] PRIMARY [facility]  History of Present Illness  Had CCX 9/2023 and now has an umbilical hernia that will be repaired 12/29/23 with Dr. DARNELL Rehman.  Seeing podiatry for plantar fasciitis which isn't improving.  Has had 2 steroid  packs orally and is going to go back for an injection.  Has h/o VDD and 1000 iu D3 otc.  On lipitor 10 mg qd for HL.  HTN controlled with HCTZ 12.5 mg qd.  Seeing a therapist to help her with life issues.  It is going well.  She is on cymbalta 30 mg qd for anxiety and depressed.  She is struggling with more irritation and agitation and wondering about increasing cymbalta from 30 mg to 60 mg qd.  Has NIDDM and is metformin  mg qd.    Patient request Pap smear.  History of abnormal Pap smears.  Her visit today was only scheduled for a med check for 15 minutes so we will have to reschedule  Objective   Vital Signs:  /74 (BP Location: Left arm, Patient Position: Sitting, Cuff Size: Large Adult)   Pulse 87   Temp 97.1 °F (36.2 °C)   Ht 165.1 cm (65\")   Wt 98.4 kg (216 lb 14.4 oz)   SpO2 98%   BMI 36.09 kg/m²   Estimated body mass index is 36.09 kg/m² as calculated from the following:    Height as of this encounter: 165.1 cm (65\").    Weight as of this encounter: 98.4 kg (216 lb 14.4 oz).               Physical Exam  Vitals and nursing note reviewed.   Constitutional:       Appearance: Normal appearance. She is well-developed.   HENT:      Head: Normocephalic and atraumatic.      Right Ear: External ear normal.      Left Ear: External ear normal.   Eyes:      Extraocular Movements: Extraocular movements intact.      Conjunctiva/sclera: Conjunctivae normal.   Neck:      Vascular: No carotid bruit.   Cardiovascular:      Rate and Rhythm: Normal rate and regular rhythm.      Heart sounds: Normal heart sounds.      Comments: No bruits  Pulmonary:     "  Effort: Pulmonary effort is normal. No respiratory distress.      Breath sounds: Normal breath sounds. No stridor. No wheezing, rhonchi or rales.   Chest:      Chest wall: No tenderness.   Abdominal:      General: Bowel sounds are normal. There is no distension.      Palpations: Abdomen is soft. There is no mass.      Tenderness: There is no abdominal tenderness. There is no guarding or rebound.      Hernia: No hernia is present.   Musculoskeletal:      Cervical back: Neck supple.   Lymphadenopathy:      Cervical: No cervical adenopathy.   Skin:     General: Skin is warm.   Neurological:      Mental Status: She is alert and oriented to person, place, and time. Mental status is at baseline.   Psychiatric:         Mood and Affect: Mood normal.         Behavior: Behavior normal.         Thought Content: Thought content normal.         Judgment: Judgment normal.        Result Review :                   Assessment and Plan   Diagnoses and all orders for this visit:    1. Vitamin D deficiency (Primary)  -     Vitamin D,25-Hydroxy    2. Encounter for screening mammogram for breast cancer  -     Mammo screening digital tomosynthesis bilateral w CAD; Future    3. Mixed hyperlipidemia  -     Lipid Panel With LDL / HDL Ratio  -     Comprehensive Metabolic Panel  -     CK    4. Prediabetes  -     Hemoglobin A1c  -     Comprehensive Metabolic Panel  -     MicroAlbumin, Urine, Random - Urine, Clean Catch    5. Essential hypertension  -     Hemoglobin A1c  -     Lipid Panel With LDL / HDL Ratio  -     Comprehensive Metabolic Panel  -     T4, Free  -     TSH  -     Vitamin D,25-Hydroxy  -     CBC & Differential  -     CK    Other orders  -     DULoxetine (CYMBALTA) 60 MG capsule; Take 1 capsule by mouth Daily.  Dispense: 90 capsule; Refill: 0      Patient is due for fasting blood work to check her lipids, liver and CK level.  She will continue Lipitor 10 mg daily.  We did discuss the importance of heart healthy diet and exercise.   She will continue metformin extended release 500 mg once daily with food due to early diabetes.  Check A1c.  Hypertension controlled with HCTZ 12.5 mg daily.  Check electrolytes.  Anxiety-increasing agitation and irritation on duloxetine 30 mg daily.  He is seeing a counselor and that is going well.  I discussed that we can try increasing this to 60 mg of duloxetine to see if that helps with Nidia.  If she does not see an improvement in 4 to 6 weeks we will drop the dose back to 30 mg daily.  We also discussed that substances such as marijuana alcohol can actually worsen mental health.  She is due for pneumonia shot but she is recently had shingles, RSV, flu and COVID vaccines so we will delay the Prevnar 20 until I see her back in 3 to 4 months for her Pap smear visit.       Follow Up   No follow-ups on file.  Patient was given instructions and counseling regarding her condition or for health maintenance advice. Please see specific information pulled into the AVS if appropriate.

## 2023-12-13 LAB
25(OH)D3+25(OH)D2 SERPL-MCNC: 39 NG/ML (ref 30–100)
ALBUMIN SERPL-MCNC: 4.5 G/DL (ref 3.5–5.2)
ALBUMIN/GLOB SERPL: 2 G/DL
ALP SERPL-CCNC: 58 U/L (ref 39–117)
ALT SERPL-CCNC: 14 U/L (ref 1–33)
AST SERPL-CCNC: 14 U/L (ref 1–32)
BASOPHILS # BLD AUTO: 0.02 10*3/MM3 (ref 0–0.2)
BASOPHILS NFR BLD AUTO: 0.3 % (ref 0–1.5)
BILIRUB SERPL-MCNC: 0.6 MG/DL (ref 0–1.2)
BUN SERPL-MCNC: 17 MG/DL (ref 8–23)
BUN/CREAT SERPL: 18.7 (ref 7–25)
CALCIUM SERPL-MCNC: 9.8 MG/DL (ref 8.6–10.5)
CHLORIDE SERPL-SCNC: 101 MMOL/L (ref 98–107)
CHOLEST SERPL-MCNC: 164 MG/DL (ref 0–200)
CK SERPL-CCNC: 72 U/L (ref 20–180)
CO2 SERPL-SCNC: 30.8 MMOL/L (ref 22–29)
CREAT SERPL-MCNC: 0.91 MG/DL (ref 0.57–1)
EGFRCR SERPLBLD CKD-EPI 2021: 68.9 ML/MIN/1.73
EOSINOPHIL # BLD AUTO: 0.2 10*3/MM3 (ref 0–0.4)
EOSINOPHIL NFR BLD AUTO: 3 % (ref 0.3–6.2)
ERYTHROCYTE [DISTWIDTH] IN BLOOD BY AUTOMATED COUNT: 13.1 % (ref 12.3–15.4)
GLOBULIN SER CALC-MCNC: 2.2 GM/DL
GLUCOSE SERPL-MCNC: 122 MG/DL (ref 65–99)
HBA1C MFR BLD: 6 % (ref 4.8–5.6)
HCT VFR BLD AUTO: 44.7 % (ref 34–46.6)
HDLC SERPL-MCNC: 65 MG/DL (ref 40–60)
HGB BLD-MCNC: 15.2 G/DL (ref 12–15.9)
IMM GRANULOCYTES # BLD AUTO: 0.01 10*3/MM3 (ref 0–0.05)
IMM GRANULOCYTES NFR BLD AUTO: 0.1 % (ref 0–0.5)
LDLC SERPL CALC-MCNC: 85 MG/DL (ref 0–100)
LDLC/HDLC SERPL: 1.3 {RATIO}
LYMPHOCYTES # BLD AUTO: 2.09 10*3/MM3 (ref 0.7–3.1)
LYMPHOCYTES NFR BLD AUTO: 31.3 % (ref 19.6–45.3)
MCH RBC QN AUTO: 29.6 PG (ref 26.6–33)
MCHC RBC AUTO-ENTMCNC: 34 G/DL (ref 31.5–35.7)
MCV RBC AUTO: 87 FL (ref 79–97)
MICROALBUMIN UR-MCNC: 19.9 UG/ML
MONOCYTES # BLD AUTO: 0.59 10*3/MM3 (ref 0.1–0.9)
MONOCYTES NFR BLD AUTO: 8.8 % (ref 5–12)
NEUTROPHILS # BLD AUTO: 3.77 10*3/MM3 (ref 1.7–7)
NEUTROPHILS NFR BLD AUTO: 56.5 % (ref 42.7–76)
NRBC BLD AUTO-RTO: 0 /100 WBC (ref 0–0.2)
PLATELET # BLD AUTO: 233 10*3/MM3 (ref 140–450)
POTASSIUM SERPL-SCNC: 3.6 MMOL/L (ref 3.5–5.2)
PROT SERPL-MCNC: 6.7 G/DL (ref 6–8.5)
RBC # BLD AUTO: 5.14 10*6/MM3 (ref 3.77–5.28)
SODIUM SERPL-SCNC: 141 MMOL/L (ref 136–145)
T4 FREE SERPL-MCNC: 1.01 NG/DL (ref 0.93–1.7)
TRIGL SERPL-MCNC: 72 MG/DL (ref 0–150)
TSH SERPL DL<=0.005 MIU/L-ACNC: 1.15 UIU/ML (ref 0.27–4.2)
VLDLC SERPL CALC-MCNC: 14 MG/DL (ref 5–40)
WBC # BLD AUTO: 6.68 10*3/MM3 (ref 3.4–10.8)

## 2023-12-14 ENCOUNTER — TELEPHONE (OUTPATIENT)
Dept: FAMILY MEDICINE CLINIC | Facility: CLINIC | Age: 68
End: 2023-12-14
Payer: MEDICARE

## 2023-12-14 NOTE — TELEPHONE ENCOUNTER
"Relay     \"CHOLESTEROL IS GREAT  A1C IS SLIGHTLY INCREASED AND GLUCOSE IS ELEVATED.    RECOMMEND CONTINUE METFORMIN  AND WORK ON DIETARY CHANGES AND REDUCTION IN FOODS/DRINKS THAT CONTAIN SUGAR.\"      "

## 2023-12-18 NOTE — TELEPHONE ENCOUNTER
Name: Emerita Matthews      Relationship: Self      Best Callback Number: [phone]       HUB PROVIDED THE RELAY MESSAGE FROM THE OFFICE      PATIENT: VOICED UNDERSTANDING AND HAS NO FURTHER QUESTIONS AT THIS TIME    ADDITIONAL INFORMATION:

## 2023-12-19 RX ORDER — HYDROCHLOROTHIAZIDE 12.5 MG/1
12.5 TABLET ORAL DAILY
Qty: 90 TABLET | Refills: 3 | Status: SHIPPED | OUTPATIENT
Start: 2023-12-19

## 2023-12-19 RX ORDER — METFORMIN HYDROCHLORIDE 500 MG/1
TABLET, EXTENDED RELEASE ORAL
Qty: 90 TABLET | Refills: 3 | Status: SHIPPED | OUTPATIENT
Start: 2023-12-19

## 2023-12-29 ENCOUNTER — ANESTHESIA (OUTPATIENT)
Dept: PERIOP | Facility: HOSPITAL | Age: 68
End: 2023-12-29
Payer: MEDICARE

## 2023-12-29 ENCOUNTER — ANESTHESIA EVENT (OUTPATIENT)
Dept: PERIOP | Facility: HOSPITAL | Age: 68
End: 2023-12-29
Payer: MEDICARE

## 2023-12-29 ENCOUNTER — HOSPITAL ENCOUNTER (OUTPATIENT)
Facility: HOSPITAL | Age: 68
Setting detail: HOSPITAL OUTPATIENT SURGERY
Discharge: HOME | End: 2023-12-29
Attending: SURGERY | Admitting: SURGERY
Payer: MEDICARE

## 2023-12-29 VITALS
SYSTOLIC BLOOD PRESSURE: 138 MMHG | HEIGHT: 66 IN | HEART RATE: 82 BPM | TEMPERATURE: 98 F | WEIGHT: 219 LBS | OXYGEN SATURATION: 93 % | DIASTOLIC BLOOD PRESSURE: 84 MMHG | RESPIRATION RATE: 16 BRPM | BODY MASS INDEX: 35.2 KG/M2

## 2023-12-29 DIAGNOSIS — K43.9 VENTRAL HERNIA WITHOUT OBSTRUCTION OR GANGRENE: Primary | ICD-10-CM

## 2023-12-29 PROCEDURE — 25010000002 ONDANSETRON PER 1 MG: Performed by: ANESTHESIOLOGY

## 2023-12-29 PROCEDURE — S0260 H&P FOR SURGERY: HCPCS | Performed by: SURGERY

## 2023-12-29 PROCEDURE — 88302 TISSUE EXAM BY PATHOLOGIST: CPT | Performed by: SURGERY

## 2023-12-29 PROCEDURE — 25810000003 LACTATED RINGERS PER 1000 ML: Performed by: ANESTHESIOLOGY

## 2023-12-29 PROCEDURE — 25810000003 LACTATED RINGERS PER 1000 ML: Performed by: STUDENT IN AN ORGANIZED HEALTH CARE EDUCATION/TRAINING PROGRAM

## 2023-12-29 PROCEDURE — 25010000002 DEXAMETHASONE SODIUM PHOSPHATE 20 MG/5ML SOLUTION: Performed by: ANESTHESIOLOGY

## 2023-12-29 PROCEDURE — 25010000002 PROPOFOL 200 MG/20ML EMULSION: Performed by: ANESTHESIOLOGY

## 2023-12-29 PROCEDURE — 25010000002 SUGAMMADEX 200 MG/2ML SOLUTION: Performed by: ANESTHESIOLOGY

## 2023-12-29 PROCEDURE — 25010000002 CEFAZOLIN IN DEXTROSE 2-4 GM/100ML-% SOLUTION: Performed by: SURGERY

## 2023-12-29 PROCEDURE — C1781 MESH (IMPLANTABLE): HCPCS | Performed by: SURGERY

## 2023-12-29 PROCEDURE — 25010000002 HYDROMORPHONE PER 4 MG: Performed by: ANESTHESIOLOGY

## 2023-12-29 PROCEDURE — 25010000002 FENTANYL CITRATE (PF) 50 MCG/ML SOLUTION: Performed by: ANESTHESIOLOGY

## 2023-12-29 PROCEDURE — 49593 RPR AA HRN 1ST 3-10 RDC: CPT | Performed by: SURGERY

## 2023-12-29 PROCEDURE — 25010000002 KETOROLAC TROMETHAMINE PER 15 MG: Performed by: ANESTHESIOLOGY

## 2023-12-29 PROCEDURE — 25010000002 MIDAZOLAM PER 1 MG: Performed by: STUDENT IN AN ORGANIZED HEALTH CARE EDUCATION/TRAINING PROGRAM

## 2023-12-29 DEVICE — VENTRALEX ST HERNIA PATCH
Type: IMPLANTABLE DEVICE | Site: ABDOMEN | Status: FUNCTIONAL
Brand: VENTRALEX ST HERNIA PATCH

## 2023-12-29 RX ORDER — DEXAMETHASONE SODIUM PHOSPHATE 4 MG/ML
INJECTION, SOLUTION INTRA-ARTICULAR; INTRALESIONAL; INTRAMUSCULAR; INTRAVENOUS; SOFT TISSUE AS NEEDED
Status: DISCONTINUED | OUTPATIENT
Start: 2023-12-29 | End: 2023-12-29 | Stop reason: SURG

## 2023-12-29 RX ORDER — DROPERIDOL 2.5 MG/ML
0.62 INJECTION, SOLUTION INTRAMUSCULAR; INTRAVENOUS
Status: DISCONTINUED | OUTPATIENT
Start: 2023-12-29 | End: 2023-12-29 | Stop reason: HOSPADM

## 2023-12-29 RX ORDER — FENTANYL CITRATE 50 UG/ML
INJECTION, SOLUTION INTRAMUSCULAR; INTRAVENOUS AS NEEDED
Status: DISCONTINUED | OUTPATIENT
Start: 2023-12-29 | End: 2023-12-29 | Stop reason: SURG

## 2023-12-29 RX ORDER — LABETALOL HYDROCHLORIDE 5 MG/ML
5 INJECTION, SOLUTION INTRAVENOUS
Status: DISCONTINUED | OUTPATIENT
Start: 2023-12-29 | End: 2023-12-29 | Stop reason: HOSPADM

## 2023-12-29 RX ORDER — PROMETHAZINE HYDROCHLORIDE 25 MG/1
25 SUPPOSITORY RECTAL ONCE AS NEEDED
Status: DISCONTINUED | OUTPATIENT
Start: 2023-12-29 | End: 2023-12-29 | Stop reason: HOSPADM

## 2023-12-29 RX ORDER — ONDANSETRON 2 MG/ML
INJECTION INTRAMUSCULAR; INTRAVENOUS AS NEEDED
Status: DISCONTINUED | OUTPATIENT
Start: 2023-12-29 | End: 2023-12-29 | Stop reason: SURG

## 2023-12-29 RX ORDER — DIPHENHYDRAMINE HYDROCHLORIDE 50 MG/ML
12.5 INJECTION INTRAMUSCULAR; INTRAVENOUS
Status: DISCONTINUED | OUTPATIENT
Start: 2023-12-29 | End: 2023-12-29 | Stop reason: HOSPADM

## 2023-12-29 RX ORDER — LIDOCAINE HYDROCHLORIDE 20 MG/ML
INJECTION, SOLUTION INFILTRATION; PERINEURAL AS NEEDED
Status: DISCONTINUED | OUTPATIENT
Start: 2023-12-29 | End: 2023-12-29 | Stop reason: SURG

## 2023-12-29 RX ORDER — FENTANYL CITRATE 50 UG/ML
25 INJECTION, SOLUTION INTRAMUSCULAR; INTRAVENOUS
Status: DISCONTINUED | OUTPATIENT
Start: 2023-12-29 | End: 2023-12-29 | Stop reason: HOSPADM

## 2023-12-29 RX ORDER — PROPOFOL 10 MG/ML
INJECTION, EMULSION INTRAVENOUS AS NEEDED
Status: DISCONTINUED | OUTPATIENT
Start: 2023-12-29 | End: 2023-12-29 | Stop reason: SURG

## 2023-12-29 RX ORDER — LIDOCAINE HYDROCHLORIDE 10 MG/ML
0.5 INJECTION, SOLUTION INFILTRATION; PERINEURAL ONCE AS NEEDED
Status: DISCONTINUED | OUTPATIENT
Start: 2023-12-29 | End: 2023-12-29 | Stop reason: HOSPADM

## 2023-12-29 RX ORDER — HYDROMORPHONE HYDROCHLORIDE 1 MG/ML
0.25 INJECTION, SOLUTION INTRAMUSCULAR; INTRAVENOUS; SUBCUTANEOUS
Status: DISCONTINUED | OUTPATIENT
Start: 2023-12-29 | End: 2023-12-29 | Stop reason: HOSPADM

## 2023-12-29 RX ORDER — FAMOTIDINE 10 MG/ML
20 INJECTION, SOLUTION INTRAVENOUS ONCE
Status: COMPLETED | OUTPATIENT
Start: 2023-12-29 | End: 2023-12-29

## 2023-12-29 RX ORDER — ACETAMINOPHEN 500 MG
500 TABLET ORAL EVERY 6 HOURS PRN
COMMUNITY

## 2023-12-29 RX ORDER — EPHEDRINE SULFATE 50 MG/ML
5 INJECTION, SOLUTION INTRAVENOUS ONCE AS NEEDED
Status: DISCONTINUED | OUTPATIENT
Start: 2023-12-29 | End: 2023-12-29 | Stop reason: HOSPADM

## 2023-12-29 RX ORDER — IPRATROPIUM BROMIDE AND ALBUTEROL SULFATE 2.5; .5 MG/3ML; MG/3ML
3 SOLUTION RESPIRATORY (INHALATION) ONCE AS NEEDED
Status: DISCONTINUED | OUTPATIENT
Start: 2023-12-29 | End: 2023-12-29 | Stop reason: HOSPADM

## 2023-12-29 RX ORDER — KETOROLAC TROMETHAMINE 30 MG/ML
INJECTION, SOLUTION INTRAMUSCULAR; INTRAVENOUS AS NEEDED
Status: DISCONTINUED | OUTPATIENT
Start: 2023-12-29 | End: 2023-12-29 | Stop reason: SURG

## 2023-12-29 RX ORDER — MAGNESIUM HYDROXIDE 1200 MG/15ML
LIQUID ORAL AS NEEDED
Status: DISCONTINUED | OUTPATIENT
Start: 2023-12-29 | End: 2023-12-29 | Stop reason: HOSPADM

## 2023-12-29 RX ORDER — HYDRALAZINE HYDROCHLORIDE 20 MG/ML
5 INJECTION INTRAMUSCULAR; INTRAVENOUS
Status: DISCONTINUED | OUTPATIENT
Start: 2023-12-29 | End: 2023-12-29 | Stop reason: HOSPADM

## 2023-12-29 RX ORDER — ONDANSETRON 2 MG/ML
4 INJECTION INTRAMUSCULAR; INTRAVENOUS ONCE AS NEEDED
Status: DISCONTINUED | OUTPATIENT
Start: 2023-12-29 | End: 2023-12-29 | Stop reason: HOSPADM

## 2023-12-29 RX ORDER — FLUMAZENIL 0.1 MG/ML
0.2 INJECTION INTRAVENOUS AS NEEDED
Status: DISCONTINUED | OUTPATIENT
Start: 2023-12-29 | End: 2023-12-29 | Stop reason: HOSPADM

## 2023-12-29 RX ORDER — ROCURONIUM BROMIDE 10 MG/ML
INJECTION, SOLUTION INTRAVENOUS AS NEEDED
Status: DISCONTINUED | OUTPATIENT
Start: 2023-12-29 | End: 2023-12-29 | Stop reason: SURG

## 2023-12-29 RX ORDER — SODIUM CHLORIDE, SODIUM LACTATE, POTASSIUM CHLORIDE, CALCIUM CHLORIDE 600; 310; 30; 20 MG/100ML; MG/100ML; MG/100ML; MG/100ML
INJECTION, SOLUTION INTRAVENOUS CONTINUOUS PRN
Status: DISCONTINUED | OUTPATIENT
Start: 2023-12-29 | End: 2023-12-29 | Stop reason: SURG

## 2023-12-29 RX ORDER — OXYCODONE HYDROCHLORIDE AND ACETAMINOPHEN 5; 325 MG/1; MG/1
1 TABLET ORAL EVERY 4 HOURS PRN
Qty: 20 TABLET | Refills: 0 | Status: SHIPPED | OUTPATIENT
Start: 2023-12-29

## 2023-12-29 RX ORDER — SODIUM CHLORIDE 0.9 % (FLUSH) 0.9 %
3-10 SYRINGE (ML) INJECTION AS NEEDED
Status: DISCONTINUED | OUTPATIENT
Start: 2023-12-29 | End: 2023-12-29 | Stop reason: HOSPADM

## 2023-12-29 RX ORDER — NALOXONE HCL 0.4 MG/ML
0.2 VIAL (ML) INJECTION AS NEEDED
Status: DISCONTINUED | OUTPATIENT
Start: 2023-12-29 | End: 2023-12-29 | Stop reason: HOSPADM

## 2023-12-29 RX ORDER — MIDAZOLAM HYDROCHLORIDE 1 MG/ML
0.5 INJECTION INTRAMUSCULAR; INTRAVENOUS
Status: DISCONTINUED | OUTPATIENT
Start: 2023-12-29 | End: 2023-12-29 | Stop reason: HOSPADM

## 2023-12-29 RX ORDER — BUPIVACAINE HYDROCHLORIDE AND EPINEPHRINE 5; 5 MG/ML; UG/ML
INJECTION, SOLUTION EPIDURAL; INTRACAUDAL; PERINEURAL AS NEEDED
Status: DISCONTINUED | OUTPATIENT
Start: 2023-12-29 | End: 2023-12-29 | Stop reason: HOSPADM

## 2023-12-29 RX ORDER — SODIUM CHLORIDE, SODIUM LACTATE, POTASSIUM CHLORIDE, CALCIUM CHLORIDE 600; 310; 30; 20 MG/100ML; MG/100ML; MG/100ML; MG/100ML
9 INJECTION, SOLUTION INTRAVENOUS CONTINUOUS
Status: DISCONTINUED | OUTPATIENT
Start: 2023-12-29 | End: 2023-12-29 | Stop reason: HOSPADM

## 2023-12-29 RX ORDER — PROMETHAZINE HYDROCHLORIDE 25 MG/1
25 TABLET ORAL ONCE AS NEEDED
Status: DISCONTINUED | OUTPATIENT
Start: 2023-12-29 | End: 2023-12-29 | Stop reason: HOSPADM

## 2023-12-29 RX ORDER — CEFAZOLIN SODIUM 2 G/100ML
2 INJECTION, SOLUTION INTRAVENOUS ONCE
Status: COMPLETED | OUTPATIENT
Start: 2023-12-29 | End: 2023-12-29

## 2023-12-29 RX ORDER — OXYCODONE HYDROCHLORIDE AND ACETAMINOPHEN 5; 325 MG/1; MG/1
1 TABLET ORAL ONCE
Status: COMPLETED | OUTPATIENT
Start: 2023-12-29 | End: 2023-12-29

## 2023-12-29 RX ORDER — HYDROCODONE BITARTRATE AND ACETAMINOPHEN 5; 325 MG/1; MG/1
1 TABLET ORAL ONCE AS NEEDED
Status: DISCONTINUED | OUTPATIENT
Start: 2023-12-29 | End: 2023-12-29 | Stop reason: HOSPADM

## 2023-12-29 RX ORDER — HYDROCODONE BITARTRATE AND ACETAMINOPHEN 7.5; 325 MG/1; MG/1
1 TABLET ORAL EVERY 4 HOURS PRN
Status: DISCONTINUED | OUTPATIENT
Start: 2023-12-29 | End: 2023-12-29 | Stop reason: HOSPADM

## 2023-12-29 RX ORDER — SODIUM CHLORIDE 0.9 % (FLUSH) 0.9 %
3 SYRINGE (ML) INJECTION EVERY 12 HOURS SCHEDULED
Status: DISCONTINUED | OUTPATIENT
Start: 2023-12-29 | End: 2023-12-29 | Stop reason: HOSPADM

## 2023-12-29 RX ADMIN — HYDROMORPHONE HYDROCHLORIDE 0.25 MG: 1 INJECTION, SOLUTION INTRAMUSCULAR; INTRAVENOUS; SUBCUTANEOUS at 10:08

## 2023-12-29 RX ADMIN — HYDROMORPHONE HYDROCHLORIDE 0.25 MG: 1 INJECTION, SOLUTION INTRAMUSCULAR; INTRAVENOUS; SUBCUTANEOUS at 10:03

## 2023-12-29 RX ADMIN — SUGAMMADEX 200 MG: 100 INJECTION, SOLUTION INTRAVENOUS at 09:15

## 2023-12-29 RX ADMIN — PROPOFOL 150 MG: 10 INJECTION, EMULSION INTRAVENOUS at 08:34

## 2023-12-29 RX ADMIN — LIDOCAINE HYDROCHLORIDE 80 MG: 20 INJECTION, SOLUTION INFILTRATION; PERINEURAL at 08:34

## 2023-12-29 RX ADMIN — FAMOTIDINE 20 MG: 10 INJECTION INTRAVENOUS at 08:13

## 2023-12-29 RX ADMIN — MIDAZOLAM HYDROCHLORIDE 0.5 MG: 1 INJECTION, SOLUTION INTRAMUSCULAR; INTRAVENOUS at 08:13

## 2023-12-29 RX ADMIN — CEFAZOLIN SODIUM 2 G: 2 INJECTION, SOLUTION INTRAVENOUS at 08:13

## 2023-12-29 RX ADMIN — PROPOFOL 50 MG: 10 INJECTION, EMULSION INTRAVENOUS at 09:15

## 2023-12-29 RX ADMIN — OXYCODONE AND ACETAMINOPHEN 1 TABLET: 5; 325 TABLET ORAL at 10:00

## 2023-12-29 RX ADMIN — ROCURONIUM BROMIDE 50 MG: 10 INJECTION, SOLUTION INTRAVENOUS at 08:34

## 2023-12-29 RX ADMIN — KETOROLAC TROMETHAMINE 30 MG: 30 INJECTION, SOLUTION INTRAMUSCULAR; INTRAVENOUS at 08:46

## 2023-12-29 RX ADMIN — SODIUM CHLORIDE, POTASSIUM CHLORIDE, SODIUM LACTATE AND CALCIUM CHLORIDE 9 ML/HR: 600; 310; 30; 20 INJECTION, SOLUTION INTRAVENOUS at 08:13

## 2023-12-29 RX ADMIN — FENTANYL CITRATE 50 MCG: 50 INJECTION, SOLUTION INTRAMUSCULAR; INTRAVENOUS at 08:51

## 2023-12-29 RX ADMIN — DEXAMETHASONE SODIUM PHOSPHATE 8 MG: 4 INJECTION, SOLUTION INTRAMUSCULAR; INTRAVENOUS at 08:44

## 2023-12-29 RX ADMIN — SODIUM CHLORIDE, POTASSIUM CHLORIDE, SODIUM LACTATE AND CALCIUM CHLORIDE: 600; 310; 30; 20 INJECTION, SOLUTION INTRAVENOUS at 08:32

## 2023-12-29 RX ADMIN — ONDANSETRON 4 MG: 2 INJECTION INTRAMUSCULAR; INTRAVENOUS at 08:53

## 2023-12-29 NOTE — ANESTHESIA POSTPROCEDURE EVALUATION
Patient: Emerita Matthews    Procedure Summary       Date: 12/29/23 Room / Location: [facility] OR  / [facility] MAIN OR    Anesthesia Start: 0832 Anesthesia Stop:     Procedure: Open periumbilical hernia repair with mesh (Abdomen) Diagnosis:       Ventral hernia without obstruction or gangrene      (Ventral hernia without obstruction or gangrene [K43.9])    Surgeons: Dorina Rehman MD Provider: Rossana Burnett MD    Anesthesia Type: general ASA Status: 2            Anesthesia Type: general    Vitals  Vitals Value Taken Time   /87 12/29/23 1015   Temp 36.7 °C (98 °F) 12/29/23 0935   Pulse 80 12/29/23 1017   Resp     SpO2 94 % 12/29/23 1017   Vitals shown include unfiled device data.        Post Anesthesia Care and Evaluation    Patient location during evaluation: PHASE II  Patient participation: complete - patient participated  Level of consciousness: awake  Pain management: adequate    Airway patency: patent  Anesthetic complications: No anesthetic complications  PONV Status: none  Cardiovascular status: stable  Respiratory status: acceptable  Hydration status: acceptable

## 2023-12-29 NOTE — ANESTHESIA PROCEDURE NOTES
Airway  Urgency: elective    Date/Time: 12/29/2023 8:37 AM  Airway not difficult    General Information and Staff    Patient location during procedure: OR  Anesthesiologist: Da Hebert MD    Indications and Patient Condition  Indications for airway management: airway protection    Preoxygenated: yes  MILS maintained throughout  Mask difficulty assessment: 1 - vent by mask    Final Airway Details  Final airway type: endotracheal airway      Successful airway: ETT  Cuffed: yes   Successful intubation technique: direct laryngoscopy  Endotracheal tube insertion site: oral  Blade: Ed  Blade size: 3  ETT size (mm): 7.0  Cormack-Lehane Classification: grade I - full view of glottis  Placement verified by: chest auscultation and capnometry   Measured from: teeth  ETT/EBT  to teeth (cm): 21  Number of attempts at approach: 1  Assessment: lips, teeth, and gum same as pre-op and atraumatic intubation

## 2023-12-29 NOTE — OP NOTE
Operative Note :  Dorina Rehman MD      Emerita Matthews  1955    Procedure Date: 12/29/23    Pre-op Diagnosis:  Ventral hernia without obstruction or gangrene [K43.9]    Post-Operative Diagnosis:  Ventral hernia without obstruction or gangrene [K43.9]    Procedure:   Open ventral/periumbilical hernia repair with mesh (4 cm, nonrecurrent, reducible)    Surgeon: Dorina Rehman MD    Assistant: Jean Newell CSA (Jean was responsible for suctioning, retracting, suturing of all surgical incisions, and application of sterile dressings at the completion of the case)    Anesthesia:  General (general endotracheal tube)    Estimated Blood Loss: minimal    Specimens: Hernia sac    Complications: None    Indications:  The patient is a 68-year-old lady who underwent a laparoscopic cholecystectomy earlier this year for acute gangrenous cholecystitis.  She has now developed a right periumbilical ventral hernia containing fat with a fascial defect measuring at least 3 cm in diameter.  I have recommended proceeding with open periumbilical hernia repair today with mesh.  She understands the risks of the procedure include bleeding, wound infection, hernia recurrence, and possible mesh complications such as infection or erosion into the bowel.  Despite the risks, she has consented to proceed.    Findings: 4 cm ventral hernia defect containing omentum    Description of procedure:  The patient was brought to the operating room placed on the OR table in supine position.  An endotracheal tube was inserted and general anesthesia induced.  The abdominal wall was prepped and draped in sterile fashion and a surgical timeout completed.  An infraumbilical skin incision was made extending to the right over top of her palpable hernia defect.  The skin was anesthetized before and using 0.5% Marcaine with epinephrine.  The underlying subcutaneous fat was divided to the level of the hernia sac which was transected off of the fascia  circumferentially using electrocautery and divided off of the attached omentum.  The hernia sac was passed off to pathology in formalin.  The fascial defect measured 4 cm wide and was closed primarily over an 8 cm circular Ventralex mesh placed as an underlay beneath the peritoneal lining.  The fascia was closed using interrupted 0 Ethibond figure-of-eight sutures taking bites of the anterior surface of the mesh with each stitch.  The skin incision was then closed with interrupted 3-0 Vicryl deep dermal sutures, running 4-0 Vicryl subcuticular suture, and topical Exofin glue.  She was then extubated and transferred to PACU in stable condition with all counts correct per nursing.    Dorina Rehman MD  General, Robotic, and Endoscopic Surgery  [facility]    [address], [address]  [place] [address]  P: [phone]  F: [phone]

## 2023-12-29 NOTE — ANESTHESIA PREPROCEDURE EVALUATION
" Anesthesia Evaluation     Patient summary reviewed and Nursing notes reviewed   no history of anesthetic complications:   NPO Solid Status: > 8 hours  NPO Liquid Status: > 2 hours           Airway   Mallampati: II  TM distance: >3 FB  Neck ROM: full  No difficulty expected  Dental    (+) implants        Pulmonary - normal exam   (+) asthma (mild, no symptoms),  (-) COPD  Cardiovascular   Exercise tolerance: good (4-7 METS)    ECG reviewed  Rhythm: regular    (+) hypertension, hyperlipidemia  (-) past MI, angina, cardiac stents      Neuro/Psych  (+) psychiatric history Anxiety  (-) seizures, CVA  GI/Hepatic/Renal/Endo    (+) obesity, renal disease- stones  (-) GERD, liver disease    Musculoskeletal     (+) neck pain  Abdominal    Substance History - negative use     OB/GYN          Other - negative ROS                         Anesthesia Plan    ASA 2     general     (I have reviewed the patient's history and chart with the patient, including all pertinent laboratory results and imaging. I have explained the risks of anesthesia including but not limited to dental or airway injury, nausea, cardio-pulmonary complications, such as aspiration, MI, stroke, or death.     VITALS:  /95 (BP Location: Right arm, Patient Position: Lying)   Pulse 81   Temp 36.8 °C (98.3 °F) (Oral)   Resp 16   Ht 167 cm (65.75\")   Wt 99.3 kg (219 lb)   LMP  (LMP Unknown)   SpO2 95%   BMI 35.62 kg/m² )  intravenous induction     Anesthetic plan, risks, benefits, and alternatives have been provided, discussed and informed consent has been obtained with: patient.  Pre-procedure education provided      CODE STATUS:         "

## 2023-12-29 NOTE — H&P
General Surgery  History and Physical    CC: Abdominal wall hernia     HPI: The patient is a pleasant 68 y.o. year-old lady who presents today for evaluation of periumbilical abdominal hernia that she noticed suddenly about six weeks ago.  She felt sudden stabbing pain just to the right of her umbilicus with swelling and came to the emergency room where a CT abdomen/pelvis demonstrated a fat-containing periumbilical hernia, new from her last CT earlier this year when she came to the ER with acute calculus cholecystitis and required cholecystectomy.  The hernia has been intermittently painful since she left the ER and at times feels more swollen but at other times she is able to reduce it.  She denies any nausea, vomiting, fever, chills, or overlying skin erythema.     Past Medical History:  Asthma  Kidney stones  Hypertension  Type 2 diabetes     Past Surgical History:  Colonoscopy (2016, Dr. Hull)  Dilation and curettage  [place] tooth extraction  Tubal ligation  Lithotripsy  Laparoscopic cholecystectomy (July 2023 by me)     Medications:   Atorvastatin 10 mg daily  Calcium citrate once daily  Vitamin D3 1000 units daily  Duloxetine 30 mg daily  Hydrochlorothiazide 12.5 mg daily  Loratadine 10 mg daily  Metformin  mg daily     Allergies: No known drug allergies     Social History: , non-smoker, rare alcohol use, consistently uses marijuana products nightly     Family History: Mother with history of COPD and bladder cancer, father with progressive supranuclear palsy, maternal grandmother with history of breast cancer, daughter with history of gallbladder disease     ROS:   Constitutional: Negative for fevers or chills  HENT: Negative for hearing loss or runny nose  Eyes: Negative for vision changes or scleral icterus  Respiratory: Negative for cough or shortness of breath  Cardiovascular: Negative for chest pain or heart palpitations  Gastrointestinal: Positive for abdominal pain; negative for  abdominal distension, nausea, vomiting, constipation, melena, or hematochezia  Genitourinary: Negative for hematuria or dysuria  Musculoskeletal: Negative for joint swelling or gait instability  Neurologic: Negative for tremors or seizures  Psychiatric: Negative for suicidal ideations or agitation  All other systems reviewed and negative     Physical Exam:  Vitals:    12/29/23 0728   BP: 126/95   Resp: 16   Temp: 98.3 °F (36.8 °C)     Height: 165 cm  Weight: 97.7 kg  BMI: 35.84  General: No acute distress, well-nourished & well-developed  HEAD: normocephalic, atraumatic  EYES: normal conjunctiva, sclera anicteric  EARS: grossly normal hearing  NECK: supple, no thyromegaly  CARDIOVASCULAR: regular rate and rhythm  RESPIRATORY: clear to auscultation bilaterally  GASTROINTESTINAL: soft, nontender, non-distended, partially incarcerated fat-containing periumbilical hernia just to the right of the umbilicus that is mildly tender to palpation without overlying skin erythema  MUSCULOSKELETAL: normal gait and station. No gross extremity abnormalities  PSYCHIATRIC: oriented x3, normal mood and affect     IMAGING:  CT ABD/PELVIS:  CONCLUSION: Right periumbilical hernia has developed since 7/28/2023, only mesenteric fat within with mild edema.    ASSESSMENT & PLAN  Ms. Matthews is a 68-year-old lady with a fat-containing periumbilical hernia. I would recommend proceeding with open periumbilical hernia repair with mesh today.  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  She understands the risks of surgery include bleeding, wound infection, and possible hernia recurrence.  Despite these risks, she has consented to proceed.     Dorina Rehman MD  General, Robotic, and Endoscopic Surgery  [facility]    [address], [address]  [place]  [address]  P: [phone]  F: [phone]

## 2023-12-30 LAB
LAB AP CASE REPORT: NORMAL
PATH REPORT.FINAL DX SPEC: NORMAL
PATH REPORT.GROSS SPEC: NORMAL

## 2024-01-11 ENCOUNTER — OFFICE VISIT (OUTPATIENT)
Dept: SURGERY | Facility: CLINIC | Age: 69
End: 2024-01-11
Payer: MEDICARE

## 2024-01-11 VITALS
DIASTOLIC BLOOD PRESSURE: 82 MMHG | SYSTOLIC BLOOD PRESSURE: 135 MMHG | BODY MASS INDEX: 34.07 KG/M2 | HEIGHT: 66 IN | WEIGHT: 212 LBS

## 2024-01-11 DIAGNOSIS — Z09 SURGICAL FOLLOWUP: Primary | ICD-10-CM

## 2024-01-11 DIAGNOSIS — K43.2 INCISIONAL HERNIA, WITHOUT OBSTRUCTION OR GANGRENE: ICD-10-CM

## 2024-01-11 NOTE — PROGRESS NOTES
CHIEF COMPLAINT:   Chief Complaint   Patient presents with    Post-op     Open ventral/periumbilical hernia repair with mesh 12/29/23       HISTORY OF PRESENT ILLNESS:  This is a 68 y.o. female who presents for a post-operative visit after undergoing open incisional hernia repair with mesh on 12/29/2023.  She had mild to moderate postoperative pain which is nearly resolved and she has not required any further narcotic pain medication in the last few days.  She reports some swelling and firmness of the soft tissues to the right of her umbilicus where her hernia used to be but has had no drainage from her incision or cellulitis of the surrounding skin.    Pathology:   Soft tissue, ventral, excision:               A.  Fibroadipose tissue with fibrosis consistent with hernia sac.               B.  No significant inflammation, heterotopia nor evidence of neoplasm.    PHYSICAL EXAM:  Lungs: Clear  Heart: RRR  ABD: Right lower periumbilical incision is healing well with some palpable firm indurated subcutaneous fat at the site of what most likely represents a postoperative seroma, it does not expand when she stands upright and performs Valsalva  Ext: no significant edema, calves nontender    A/P:  This is a 68 y.o. female patient who is S/P open incisional hernia repair with mesh on 12/29/2023    I suspect she has developed a postoperative seroma versus hematoma, which would account for the firm subcutaneous fat at the site of her previous hernia.  I emphasized the importance of continuing to avoid heavy lifting for another 6 weeks.  I would like to see her back in 6 weeks time for recheck of her incision/seroma and she can call in the meantime with any drainage from the incision.    Dorina Rehman MD  General, Robotic, and Endoscopic Surgery  [facility]    [address] [place] [address], [address]  [place] [address]  P: [phone]  F: [phone]

## 2024-01-17 ENCOUNTER — TELEPHONE (OUTPATIENT)
Dept: SURGERY | Facility: CLINIC | Age: 69
End: 2024-01-17
Payer: MEDICARE

## 2024-01-17 NOTE — TELEPHONE ENCOUNTER
Patient called triage line asking if she could push her  in the wheelchair or push the trash can to the curb. Advised patient that she should avoid pushing, pulling, and lifting until her follow up appointment with Dr. Rehman. Patient voiced understanding and stated that she does have help. Patient did not have any further questions.

## 2024-01-31 ENCOUNTER — HOSPITAL ENCOUNTER (OUTPATIENT)
Dept: MAMMOGRAPHY | Facility: HOSPITAL | Age: 69
Discharge: HOME | End: 2024-01-31
Admitting: INTERNAL MEDICINE
Payer: MEDICARE

## 2024-01-31 DIAGNOSIS — Z12.31 ENCOUNTER FOR SCREENING MAMMOGRAM FOR BREAST CANCER: ICD-10-CM

## 2024-01-31 PROCEDURE — 77067 SCR MAMMO BI INCL CAD: CPT

## 2024-01-31 PROCEDURE — 77063 BREAST TOMOSYNTHESIS BI: CPT

## 2024-02-08 DIAGNOSIS — R92.8 ABNORMAL MAMMOGRAM: Primary | ICD-10-CM

## 2024-02-20 ENCOUNTER — OFFICE VISIT (OUTPATIENT)
Dept: SURGERY | Facility: CLINIC | Age: 69
End: 2024-02-20
Payer: MEDICARE

## 2024-02-20 VITALS
WEIGHT: 208.6 LBS | HEIGHT: 66 IN | DIASTOLIC BLOOD PRESSURE: 72 MMHG | BODY MASS INDEX: 33.52 KG/M2 | SYSTOLIC BLOOD PRESSURE: 124 MMHG

## 2024-02-20 DIAGNOSIS — Z09 SURGICAL FOLLOWUP: Primary | ICD-10-CM

## 2024-02-20 PROCEDURE — 1160F RVW MEDS BY RX/DR IN RCRD: CPT | Performed by: SURGERY

## 2024-02-20 PROCEDURE — 1159F MED LIST DOCD IN RCRD: CPT | Performed by: SURGERY

## 2024-02-20 PROCEDURE — 99212 OFFICE O/P EST SF 10 MIN: CPT | Performed by: SURGERY

## 2024-02-20 NOTE — PROGRESS NOTES
CHIEF COMPLAINT:   Chief Complaint   Patient presents with    Follow-up     Open ventral/periumbilical hernia repair with mesh 12/29/23       HISTORY OF PRESENT ILLNESS:  This is a 68 y.o. female who presents for a post-operative visit after undergoing open incisional hernia repair with mesh on 12/29/2023.  She developed a small hematoma versus seroma within the right periumbilical fat that has since resolved.  She has been doing very well since I saw her last and has no complaints.    Pathology:   Soft tissue, ventral, excision:               A.  Fibroadipose tissue with fibrosis consistent with hernia sac.               B.  No significant inflammation, heterotopia nor evidence of neoplasm.    PHYSICAL EXAM:  Lungs: Clear  Heart: RRR  ABD: Right lower periumbilical incision is well-healed with no palpable soft tissue thickening or recurrent hernia when she stands upright and performs Valsalva  Ext: no significant edema, calves nontender    A/P:  This is a 68 y.o. female patient who is S/P open incisional hernia repair with mesh on 12/29/2023    Her hematoma/seroma has resolved and she shows no evidence for recurrent hernia.  She can resume all activities starting this Friday, 8 weeks postop.  In the meantime she should continue to avoid any heavy lifting.  She can see me back on an as-needed basis.    Dorina Rehman MD  General, Robotic, and Endoscopic Surgery  [facility]    [address], [address]  [place] [address]  P: [phone]  F: [phone]

## 2024-02-22 ENCOUNTER — OFFICE VISIT (OUTPATIENT)
Dept: FAMILY MEDICINE CLINIC | Facility: CLINIC | Age: 69
End: 2024-02-22
Payer: MEDICARE

## 2024-02-22 VITALS
DIASTOLIC BLOOD PRESSURE: 78 MMHG | TEMPERATURE: 97.5 F | HEIGHT: 65 IN | WEIGHT: 210.3 LBS | HEART RATE: 86 BPM | OXYGEN SATURATION: 97 % | SYSTOLIC BLOOD PRESSURE: 108 MMHG | BODY MASS INDEX: 35.04 KG/M2

## 2024-02-22 DIAGNOSIS — E78.2 MIXED HYPERLIPIDEMIA: ICD-10-CM

## 2024-02-22 DIAGNOSIS — R73.03 PREDIABETES: ICD-10-CM

## 2024-02-22 DIAGNOSIS — E55.9 VITAMIN D DEFICIENCY: ICD-10-CM

## 2024-02-22 DIAGNOSIS — F41.9 ANXIETY: ICD-10-CM

## 2024-02-22 DIAGNOSIS — Z23 ENCOUNTER FOR IMMUNIZATION: Primary | ICD-10-CM

## 2024-02-22 RX ORDER — HYDROXYZINE HYDROCHLORIDE 10 MG/1
10 TABLET, FILM COATED ORAL EVERY 8 HOURS PRN
Qty: 30 TABLET | Refills: 1 | Status: SHIPPED | OUTPATIENT
Start: 2024-02-22

## 2024-02-22 NOTE — PROGRESS NOTES
The ABCs of the Annual Wellness Visit  Subsequent Medicare Wellness Visit    Subjective    Emerita Matthews is a 68 y.o. female who presents for a Subsequent Medicare Wellness Visit.    The following portions of the patient's history were reviewed and   updated as appropriate: allergies, current medications, past family history, past medical history, past social history, past surgical history, and problem list.    Compared to one year ago, the patient feels her physical   health is better.    Compared to one year ago, the patient feels her mental   health is better.    Recent Hospitalizations:  She was not admitted to the hospital during the last year.       Current Medical Providers:  Patient Care Team:  Josselyn Alex MD as PCP - General (Internal Medicine)    Outpatient Medications Prior to Visit   Medication Sig Dispense Refill    atorvastatin (Lipitor) 10 MG tablet Take 1 tablet by mouth Daily. To decrease risk of stroke and heart attack 90 tablet 3    CALCIUM CITRATE PO Take  by mouth Daily. HOLD FOR SURGERY      cholecalciferol (VITAMIN D3) 1000 UNITS tablet Take 1 tablet by mouth Daily. HOLD FOR SURGERY      DULoxetine (CYMBALTA) 60 MG capsule Take 1 capsule by mouth Daily. 90 capsule 0    hydroCHLOROthiazide (HYDRODIURIL) 12.5 MG tablet TAKE 1 TABLET EVERY DAY 90 tablet 3    loratadine (CLARITIN) 10 MG tablet TAKE 1 TABLET EVERY DAY 90 tablet 0    metFORMIN ER (GLUCOPHAGE-XR) 500 MG 24 hr tablet TAKE 1 TABLET EVERY DAY WITH BREAKFAST FOR PREDIABETES 90 tablet 3     No facility-administered medications prior to visit.       No opioid medication identified on active medication list. I have reviewed chart for other potential  high risk medication/s and harmful drug interactions in the elderly.        Aspirin is not on active medication list.  Aspirin use is not indicated based on review of current medical condition/s. Risk of harm outweighs potential benefits.  .    Patient Active Problem List   Diagnosis     "Anxiety    Mixed anxiety depressive disorder    Neck pain    Loss of sense of smell    Vitamin D deficiency    Plantar fasciitis of right foot    Reactive airway disease    Pyelonephritis, acute    Nephrocalcinosis    History of bacteremia    Seasonal allergies    Acute lower GI bleeding    Asthma    COVID-19 virus infection    Acute calculous cholecystitis    Ventral hernia without obstruction or gangrene     Advance Care Planning   Advance Care Planning     Advance Directive is not on file.  ACP discussion was held with the patient during this visit. Patient has an advance directive (not in EMR), copy requested. Patient does not have an advance directive, information provided.     Objective    Vitals:    24 0935   BP: 108/78   BP Location: Left arm   Patient Position: Sitting   Cuff Size: Adult   Pulse: 86   Temp: 97.5 °F (36.4 °C)   SpO2: 97%   Weight: 95.4 kg (210 lb 4.8 oz)   Height: 165.1 cm (65\")     Estimated body mass index is 35 kg/m² as calculated from the following:    Height as of this encounter: 165.1 cm (65\").    Weight as of this encounter: 95.4 kg (210 lb 4.8 oz).           Does the patient have evidence of cognitive impairment? No    Lab Results   Component Value Date    CHLPL 164 2023    TRIG 72 2023    HDL 65 (H) 2023    LDL 85 2023    VLDL 14 2023    HGBA1C 6.00 (H) 2023        HEALTH RISK ASSESSMENT    Smoking Status:  Social History     Tobacco Use   Smoking Status Never    Passive exposure: Never   Smokeless Tobacco Never     Alcohol Consumption:  Social History     Substance and Sexual Activity   Alcohol Use Yes    Comment: Seldom     Fall Risk Screen:    STEADI Fall Risk Assessment was completed, and patient is at LOW risk for falls.Assessment completed on:2024    Depression Screenin/22/2024     9:32 AM   PHQ-2/PHQ-9 Depression Screening   Little Interest or Pleasure in Doing Things 0-->not at all   Feeling Down, Depressed or Hopeless " 0-->not at all   PHQ-9: Brief Depression Severity Measure Score 0       Health Habits and Functional and Cognitive Screenin/22/2024     9:00 AM   Functional & Cognitive Status   Do you have difficulty preparing food and eating? No   Do you have difficulty bathing yourself, getting dressed or grooming yourself? No   Do you have difficulty using the toilet? No   Do you have difficulty moving around from place to place? No   Do you have trouble with steps or getting out of a bed or a chair? No   Current Diet Limited Junk Food   Dental Exam Up to date   Eye Exam Up to date   Exercise (times per week) 5 times per week   Current Exercises Include Cardiovascular Workout   Do you need help using the phone?  No   Are you deaf or do you have serious difficulty hearing?  No   Do you need help to go to places out of walking distance? No   Do you need help shopping? No   Do you need help preparing meals?  No   Do you need help with housework?  No   Do you need help with laundry? No   Do you need help taking your medications? No   Do you need help managing money? No   Do you ever drive or ride in a car without wearing a seat belt? No   Have you felt unusual stress, anger or loneliness in the last month? No   Who do you live with? Spouse   If you need help, do you have trouble finding someone available to you? No   Have you been bothered in the last four weeks by sexual problems? No   Do you have difficulty concentrating, remembering or making decisions? Yes       Age-appropriate Screening Schedule:  Refer to the list below for future screening recommendations based on patient's age, sex and/or medical conditions. Orders for these recommended tests are listed in the plan section. The patient has been provided with a written plan.    Health Maintenance   Topic Date Due    PAP SMEAR  10/18/2022    ZOSTER VACCINE (3 of 3) 01/15/2024    DIABETIC EYE EXAM  2024    HEMOGLOBIN A1C  2024    BMI FOLLOWUP  2024     "DXA SCAN  08/23/2024    COLORECTAL CANCER SCREENING  08/25/2024    LIPID PANEL  12/12/2024    URINE MICROALBUMIN  12/12/2024    ANNUAL WELLNESS VISIT  02/22/2025    DIABETIC FOOT EXAM  02/22/2025    MAMMOGRAM  01/31/2026    TDAP/TD VACCINES (3 - Td or Tdap) 12/26/2026    HEPATITIS C SCREENING  Completed    COVID-19 Vaccine  Completed    RSV Vaccine - Adults  Completed    INFLUENZA VACCINE  Completed    Pneumococcal Vaccine 65+  Completed                  CMS Preventative Services Quick Reference  Risk Factors Identified During Encounter  Immunizations Discussed/Encouraged: Prevnar 20 (Pneumococcal 20-valent conjugate)  Dental Screening Recommended  Vision Screening Recommended  The above risks/problems have been discussed with the patient.  Pertinent information has been shared with the patient in the After Visit Summary.  An After Visit Summary and PPPS were made available to the patient.    Follow Up:   Next Medicare Wellness visit to be scheduled in 1 year.       Additional E&M Note during same encounter follows:  Patient has multiple medical problems which are significant and separately identifiable that require additional work above and beyond the Medicare Wellness Visit.      Chief Complaint  Medicare Wellness-subsequent (Physical Exam )    Subjective        HPI  Emerita Matthews is also being seen today for AWV and f/u on abnormal MMG.  Needs breast exam.    She sees a therapist and she did a mini test for ADHD.         Objective   Vital Signs:  /78 (BP Location: Left arm, Patient Position: Sitting, Cuff Size: Adult)   Pulse 86   Temp 97.5 °F (36.4 °C)   Ht 165.1 cm (65\")   Wt 95.4 kg (210 lb 4.8 oz)   SpO2 97%   BMI 35.00 kg/m²     Physical Exam  Vitals and nursing note reviewed.   Constitutional:       Appearance: Normal appearance. She is well-developed.   HENT:      Head: Normocephalic and atraumatic.      Right Ear: External ear normal.      Left Ear: External ear normal.   Eyes:      Extraocular " Movements: Extraocular movements intact.      Conjunctiva/sclera: Conjunctivae normal.   Neck:      Vascular: No carotid bruit.   Cardiovascular:      Rate and Rhythm: Normal rate and regular rhythm.      Heart sounds: Normal heart sounds.      Comments: No bruits  Pulmonary:      Effort: Pulmonary effort is normal. No respiratory distress.      Breath sounds: Normal breath sounds. No stridor. No wheezing, rhonchi or rales.   Chest:      Chest wall: No tenderness.   Breasts:     Right: Mass and tenderness present.      Left: Normal.          Comments: Tender small nodule that feels c/w cyst  Abdominal:      General: Bowel sounds are normal. There is no distension.      Palpations: Abdomen is soft. There is no mass.      Tenderness: There is no abdominal tenderness. There is no guarding or rebound.      Hernia: No hernia is present.   Musculoskeletal:      Cervical back: Neck supple.   Lymphadenopathy:      Cervical: No cervical adenopathy.      Upper Body:      Right upper body: No supraclavicular, axillary or pectoral adenopathy.      Left upper body: No supraclavicular, axillary or pectoral adenopathy.   Skin:     General: Skin is warm.   Neurological:      Mental Status: She is alert and oriented to person, place, and time. Mental status is at baseline.   Psychiatric:         Mood and Affect: Mood normal.         Behavior: Behavior normal.         Thought Content: Thought content normal.         Judgment: Judgment normal.                         Assessment and Plan   Diagnoses and all orders for this visit:    1. Encounter for immunization (Primary)  -     Pneumococcal Conjugate Vaccine 20-Valent (PCV20)    2. Vitamin D deficiency  -     Vitamin D,25-Hydroxy; Future    3. Prediabetes  -     Hemoglobin A1c; Future  -     Microalbumin / Creatinine Urine Ratio - Urine, Clean Catch; Future    4. Mixed hyperlipidemia  -     Hemoglobin A1c; Future  -     Comprehensive Metabolic Panel; Future  -     Microalbumin /  Creatinine Urine Ratio - Urine, Clean Catch; Future    5. Anxiety    Other orders  -     hydrOXYzine (ATARAX) 10 MG tablet; Take 1 tablet by mouth Every 8 (Eight) Hours As Needed for Itching.  Dispense: 30 tablet; Refill: 1      Patient would like to be followed closely for prediabetes and to help hold her accountable with weight loss efforts.  She is trying to stop marijuana use which will help her control her appetite better.  I  encouraged continued exercise.  Will recheck labs before her next appointment in June.  Prevnar 20 given today.  Vaccinations reviewed.  Eye exam is up-to-date.  Pap smear is done with her gynecologist.  She is eligible for Shingrix at her pharmacy.  She has an upcoming diagnostic mammogram with ultrasound due to an area on the right breast on her screening mammography.  This is scheduled for this coming Wednesday.  The area on exam seems consistent with a cyst.  It is slightly tender to touch.  I advised patient she may want to consider aspiration if it indeed comes back as a cyst.  We also discussed anxiety.  Currently she is on duloxetine 60 mg.  Advised that I could give her a few Atarax to use at night to help her with insomnia and that way she can avoid smoking marijuana.  She seems to smoke marijuana when she cannot sleep or when she is highly anxious.  We did discuss the negative effects of marijuana including but not limited to worsening mental health and effects on the lungs.       Follow Up   No follow-ups on file.  Patient was given instructions and counseling regarding her condition or for health maintenance advice. Please see specific information pulled into the AVS if appropriate.

## 2024-02-28 ENCOUNTER — HOSPITAL ENCOUNTER (OUTPATIENT)
Dept: ULTRASOUND IMAGING | Facility: HOSPITAL | Age: 69
Discharge: HOME | End: 2024-02-28
Payer: MEDICARE

## 2024-02-28 ENCOUNTER — HOSPITAL ENCOUNTER (OUTPATIENT)
Dept: MAMMOGRAPHY | Facility: HOSPITAL | Age: 69
Discharge: HOME | End: 2024-02-28
Payer: MEDICARE

## 2024-02-28 DIAGNOSIS — R92.8 ABNORMAL MAMMOGRAM: ICD-10-CM

## 2024-02-28 PROCEDURE — 76642 ULTRASOUND BREAST LIMITED: CPT

## 2024-02-28 PROCEDURE — 77065 DX MAMMO INCL CAD UNI: CPT

## 2024-02-28 PROCEDURE — G0279 TOMOSYNTHESIS, MAMMO: HCPCS

## 2024-03-01 DIAGNOSIS — N63.10 MASS OF RIGHT BREAST, UNSPECIFIED QUADRANT: Primary | ICD-10-CM

## 2024-03-01 DIAGNOSIS — R92.8 OTHER ABNORMAL AND INCONCLUSIVE FINDINGS ON DIAGNOSTIC IMAGING OF BREAST: ICD-10-CM

## 2024-03-05 RX ORDER — LORATADINE 10 MG/1
TABLET ORAL
Qty: 90 TABLET | Refills: 3 | Status: SHIPPED | OUTPATIENT
Start: 2024-03-05

## 2024-03-05 NOTE — TELEPHONE ENCOUNTER
Rx Refill Note  Requested Prescriptions     Pending Prescriptions Disp Refills    loratadine (CLARITIN) 10 MG tablet [Pharmacy Med Name: LORATADINE 10 MG Tablet] 90 tablet 3     Sig: TAKE 1 TABLET EVERY DAY      Last office visit with prescribing clinician: 2/22/2024   Last telemedicine visit with prescribing clinician: Visit date not found   Next office visit with prescribing clinician: 6/21/2024                         Would you like a call back once the refill request has been completed: [] Yes [] No    If the office needs to give you a call back, can they leave a voicemail: [] Yes [] No    Roverto Davis MA  03/05/24, 08:43 EST

## 2024-03-13 ENCOUNTER — PREP FOR SURGERY (OUTPATIENT)
Dept: OTHER | Facility: HOSPITAL | Age: 69
End: 2024-03-13
Payer: MEDICARE

## 2024-03-13 ENCOUNTER — HOSPITAL ENCOUNTER (OUTPATIENT)
Dept: ULTRASOUND IMAGING | Facility: HOSPITAL | Age: 69
Discharge: HOME | End: 2024-03-13
Admitting: INTERNAL MEDICINE
Payer: MEDICARE

## 2024-03-13 ENCOUNTER — TELEPHONE (OUTPATIENT)
Dept: GASTROENTEROLOGY | Facility: CLINIC | Age: 69
End: 2024-03-13
Payer: MEDICARE

## 2024-03-13 VITALS
RESPIRATION RATE: 18 BRPM | DIASTOLIC BLOOD PRESSURE: 71 MMHG | WEIGHT: 199.8 LBS | HEIGHT: 65 IN | TEMPERATURE: 98.4 F | SYSTOLIC BLOOD PRESSURE: 103 MMHG | BODY MASS INDEX: 33.29 KG/M2 | HEART RATE: 81 BPM | OXYGEN SATURATION: 98 %

## 2024-03-13 DIAGNOSIS — Z12.11 ENCOUNTER FOR SCREENING FOR MALIGNANT NEOPLASM OF COLON: Primary | ICD-10-CM

## 2024-03-13 DIAGNOSIS — N63.10 MASS OF RIGHT BREAST, UNSPECIFIED QUADRANT: ICD-10-CM

## 2024-03-13 DIAGNOSIS — R92.8 OTHER ABNORMAL AND INCONCLUSIVE FINDINGS ON DIAGNOSTIC IMAGING OF BREAST: ICD-10-CM

## 2024-03-13 PROCEDURE — 88305 TISSUE EXAM BY PATHOLOGIST: CPT | Performed by: INTERNAL MEDICINE

## 2024-03-13 PROCEDURE — 25010000002 LIDOCAINE 1 % SOLUTION: Performed by: RADIOLOGY

## 2024-03-13 PROCEDURE — A4648 IMPLANTABLE TISSUE MARKER: HCPCS

## 2024-03-13 RX ORDER — DIAZEPAM 5 MG/1
5 TABLET ORAL ONCE AS NEEDED
Status: DISCONTINUED | OUTPATIENT
Start: 2024-03-13 | End: 2024-03-14 | Stop reason: HOSPADM

## 2024-03-13 RX ORDER — LIDOCAINE HYDROCHLORIDE 10 MG/ML
10 INJECTION, SOLUTION INFILTRATION; PERINEURAL ONCE
Status: COMPLETED | OUTPATIENT
Start: 2024-03-13 | End: 2024-03-13

## 2024-03-13 RX ADMIN — LIDOCAINE HYDROCHLORIDE 9 ML: 10 INJECTION, SOLUTION INFILTRATION; PERINEURAL at 10:48

## 2024-03-13 RX ADMIN — LIDOCAINE HYDROCHLORIDE 9 ML: 10; .005 INJECTION, SOLUTION EPIDURAL; INFILTRATION; INTRACAUDAL; PERINEURAL at 10:48

## 2024-03-13 NOTE — TELEPHONE ENCOUNTER
LAST C/S  8/25/21  IN [facility]      PERSONAL HX OF POLYPS    NO FAMILY HX OF POLYPS    NO FAMILY HX OF COLON CA    NO ASA OR BLOOD THINNERS        LIST OF  MEDICATIONS    DULOXETINE  ATORVASTATIN  HYDROCHLOROTHIAZIDE  METFORMIN   LORATADINE  VITAMIN D3  CALCIUM            OA QUESTIONNAIRE SCANNED IN MEDIA

## 2024-03-14 ENCOUNTER — TELEPHONE (OUTPATIENT)
Dept: MAMMOGRAPHY | Facility: HOSPITAL | Age: 69
End: 2024-03-14
Payer: MEDICARE

## 2024-03-14 LAB
LAB AP CASE REPORT: NORMAL
LAB AP CLINICAL INFORMATION: NORMAL
PATH REPORT.FINAL DX SPEC: NORMAL
PATH REPORT.GROSS SPEC: NORMAL

## 2024-03-15 DIAGNOSIS — N63.0 BREAST MASS IN FEMALE: Primary | ICD-10-CM

## 2024-03-18 ENCOUNTER — TELEPHONE (OUTPATIENT)
Dept: FAMILY MEDICINE CLINIC | Facility: CLINIC | Age: 69
End: 2024-03-18

## 2024-03-18 NOTE — TELEPHONE ENCOUNTER
Caller: Emerita Matthews    Relationship: Self    Best call back number: [phone]    What was the call regarding: PATIENT CALLING FOR BREAST BIOPSY RESULTS.    PLEASE CALL.

## 2024-03-20 ENCOUNTER — TELEPHONE (OUTPATIENT)
Dept: FAMILY MEDICINE CLINIC | Facility: CLINIC | Age: 69
End: 2024-03-20
Payer: MEDICARE

## 2024-03-20 ENCOUNTER — TELEPHONE (OUTPATIENT)
Dept: GASTROENTEROLOGY | Facility: CLINIC | Age: 69
End: 2024-03-20
Payer: MEDICARE

## 2024-03-20 NOTE — TELEPHONE ENCOUNTER
Pt called to go over breast biopsy results. Informed pt of Dr. Alex message pt scheduled to see Dr. Cma.

## 2024-03-25 ENCOUNTER — TELEPHONE (OUTPATIENT)
Dept: GASTROENTEROLOGY | Facility: CLINIC | Age: 69
End: 2024-03-25
Payer: MEDICARE

## 2024-03-28 ENCOUNTER — TELEPHONE (OUTPATIENT)
Dept: FAMILY MEDICINE CLINIC | Facility: CLINIC | Age: 69
End: 2024-03-28

## 2024-03-28 NOTE — TELEPHONE ENCOUNTER
Caller: Emerita Matthews    Relationship to patient: Self    Best call back number: [phone]     Date of positive COVID19 test: 3/28/2024    COVID19 symptoms:     HORRIBLE COUGH WORSE AT NIGHT, CONGESTION IN THE NASAL, RUNNING A FEVER OFF AND ON        Additional information or concerns:     PATIENT IS HOPING YOU WILL CALL HER IN SOME COUGH SYRUP FOR HER COUGH.  SHE ALSO INQUIRED ABOUT PAXLOVID.      What is the patients preferred pharmacy:     [facility] [id number] - [address] AT [address] - [phone] [facility] [phone] FX       PLEASE ADVISE

## 2024-03-29 ENCOUNTER — TELEMEDICINE (OUTPATIENT)
Dept: FAMILY MEDICINE CLINIC | Facility: CLINIC | Age: 69
End: 2024-03-29
Payer: MEDICARE

## 2024-03-29 DIAGNOSIS — U07.1 COVID: Primary | ICD-10-CM

## 2024-03-29 PROCEDURE — 99214 OFFICE O/P EST MOD 30 MIN: CPT | Performed by: NURSE PRACTITIONER

## 2024-03-29 RX ORDER — DEXTROMETHORPHAN HYDROBROMIDE AND PROMETHAZINE HYDROCHLORIDE 15; 6.25 MG/5ML; MG/5ML
5 SYRUP ORAL 4 TIMES DAILY PRN
Qty: 240 ML | Refills: 1 | Status: SHIPPED | OUTPATIENT
Start: 2024-03-29

## 2024-03-29 NOTE — PROGRESS NOTES
"Chief Complaint  Covid-19 Home Monitoring Video Visit (Pt tested positive for covid yesterday )    Subjective        Emerita Matthews presents to [facility]  History of Present Illness  Pleasant patient complains of cough fever body aches fever 101 started Tuesday without chest pain or increasing shortness of breath   Got covid vac  last fall   Asthma controlled        Objective   Vital Signs:  There were no vitals taken for this visit.  Estimated body mass index is 33.91 kg/m² as calculated from the following:    Height as of 4/4/24: 165.1 cm (65\").    Weight as of 4/4/24: 92.4 kg (203 lb 12.8 oz).            Physical Exam  Constitutional:       General: She is not in acute distress.     Appearance: Normal appearance. She is not ill-appearing, toxic-appearing or diaphoretic.   Eyes:      Conjunctiva/sclera: Conjunctivae normal.      Pupils: Pupils are equal, round, and reactive to light.   Pulmonary:      Effort: Pulmonary effort is normal. No respiratory distress.      Breath sounds: No stridor.      Comments: Able to speak full sentences without dyspnea respirations less than 20  Skin:     General: Skin is warm and dry.   Neurological:      General: No focal deficit present.      Mental Status: She is alert. Mental status is at baseline.   Psychiatric:         Mood and Affect: Mood normal.         Thought Content: Thought content normal.         Judgment: Judgment normal.        Result Review :                Assessment and Plan   Diagnoses and all orders for this visit:    1. COVID (Primary)    Other orders  -     Nirmatrelvir & Ritonavir, 300mg/100mg, (PAXLOVID); Take 3 tablets by mouth 2 (Two) Times a Day. Follow pkt instructions  Dispense: 30 each; Refill: 0  -     promethazine-dextromethorphan (PROMETHAZINE-DM) 6.25-15 MG/5ML syrup; Take 5 mL by mouth 4 (Four) Times a Day As Needed for Cough.  Dispense: 240 mL; Refill: 1             Follow Up   No follow-ups on file.  Patient was " given instructions and counseling regarding her condition or for health maintenance advice. Please see specific information pulled into the AVS if appropriate.     There are no Patient Instructions on file for this visit.       Discussed emergency use authorization Paxlovid risk-benefit  Push fluids, holding statin for 10 days if taking, Promethazine DM if needed for bedtime use caution sedation could happen,  Tylenol ibuprofen high fever chest pain increase shortness of breath emergency room  Follow-up call several days,   Verbal instructions provided and knowledgeable feedback provided by patient.    Video visit patient permission 15 minutes patient is at home I am in the office

## 2024-04-04 ENCOUNTER — OFFICE VISIT (OUTPATIENT)
Dept: SURGERY | Facility: CLINIC | Age: 69
End: 2024-04-04
Payer: MEDICARE

## 2024-04-04 VITALS
DIASTOLIC BLOOD PRESSURE: 70 MMHG | SYSTOLIC BLOOD PRESSURE: 110 MMHG | WEIGHT: 203.8 LBS | HEIGHT: 65 IN | BODY MASS INDEX: 33.95 KG/M2

## 2024-04-04 DIAGNOSIS — R92.8 ABNORMAL MAMMOGRAM OF RIGHT BREAST: Primary | ICD-10-CM

## 2024-04-04 PROCEDURE — 1159F MED LIST DOCD IN RCRD: CPT | Performed by: SURGERY

## 2024-04-04 PROCEDURE — 1160F RVW MEDS BY RX/DR IN RCRD: CPT | Performed by: SURGERY

## 2024-04-04 PROCEDURE — 99213 OFFICE O/P EST LOW 20 MIN: CPT | Performed by: SURGERY

## 2024-04-04 NOTE — PROGRESS NOTES
General Surgery  Established Patient Office Visit    CC: Right breast abnormality found on mammogram    HPI: The patient is a pleasant 68 y.o. year-old lady who presents today for discussion of a recently identified abnormality within the right breast and the lateral half of the breast.  It was identified on screening mammogram and has an area of increased density that was oval in shape located at the 9 o'clock position about 7 to 8 cm from the nipple.  She then underwent spot compression mammograms and the abnormality persisted.  She had a targeted ultrasound of the lateral aspect of her right breast which demonstrated a atypical cystic/solid mass at the 9 o'clock position 7 cm from the nipple measuring 6 mm x 6 mm x 4 mm.  She was underwent an ultrasound-guided right breast biopsy with final pathology returning benign, showing clusters of aper consists, duct ectasia, and usual type ductal hyperplasia.  There was no evidence for atypical ductal hyperplasia, carcinoma in situ, or invasive carcinoma.  She denies any breast pain, breast erythema, breast engorgement, nipple discharge, or nipple retraction bilaterally.  She has a family history of breast cancer in her maternal grandmother, but her mother and 5 sisters have all been in good health with no breast cancer.    Past Medical History:  Asthma  Kidney stones  Hypertension  Type 2 diabetes     Past Surgical History:  Open periumbilical hernia repair with mesh (December 2023 by me)  Colonoscopy (2016, Dr. Hull)  Dilation and curettage  [place] tooth extraction  Tubal ligation  Lithotripsy  Laparoscopic cholecystectomy (July 2023 by me)    Medications:   Atorvastatin 10 mg daily  Calcium citrate once daily  Vitamin D3 1000 units daily  Duloxetine 30 mg daily  Hydrochlorothiazide 12.5 mg daily  Hydroxyzine 10 mg every 8 hours as needed for itching  Loratadine 10 mg daily  Metformin  mg daily  Paxlovid 300 mg 2 times daily  Promethazine-dextromethorphan as  needed for cough    Allergies: No known drug allergies    Social History: , non-smoker, rare alcohol use, consistently uses marijuana products nightly, 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     Family History: Mother with history of COPD and bladder cancer, father with progressive supranuclear palsy, maternal grandmother with history of breast cancer, daughter with history of gallbladder disease    ROS:   Constitutional: Negative for fevers or chills  HENT: Negative for hearing loss or runny nose  Eyes: Negative for vision changes or scleral icterus  Respiratory: Negative for cough or shortness of breath  Cardiovascular: Negative for chest pain or heart palpitations  Gastrointestinal: Positive for abdominal pain; negative for abdominal distension, nausea, vomiting, constipation, melena, or hematochezia  Genitourinary: Negative for hematuria or dysuria  Musculoskeletal: Negative for joint swelling or gait instability  Neurologic: Negative for tremors or seizures  Psychiatric: Negative for suicidal ideations or agitation  All other systems reviewed and negative    Physical Exam:  Height: 165 cm  Weight: 92 kg  BMI: 33  General: No acute distress, well-nourished & well-developed  HEAD: normocephalic, atraumatic  EYES: normal conjunctiva, sclera anicteric  EARS: grossly normal hearing  NECK: supple, no thyromegaly  CARDIOVASCULAR: regular rate and rhythm  RESPIRATORY: clear to auscultation bilaterally  GASTROINTESTINAL: soft, nontender, non-distended, well-healed periumbilical scar  MUSCULOSKELETAL: normal gait and station. No gross extremity abnormalities  PSYCHIATRIC: oriented x3, normal mood and affect  BREAST: Bilateral breasts are symmetric and normal in shape/palpation with a small palpable area of nodularity at the 9 o'clock position of the right breast where she recently had a biopsy done, this area is very mildly tender to palpation and corresponds to the abnormality seen on mammogram, there is no  skin erythema of either breast, no nipple retraction, and no abnormal skin dimpling of the breasts    IMAGING:  BILATERAL SCREENING MAMMOGRAM (Jan 2024)  IMPRESSION:  New, equal density oval mass in the right lateral breast at the 9 to 10 o'clock position, posterior depth, 10 cm from the nipple will need additional evaluation with spot compression CC and full field true lateral view. Targeted ultrasound is also recommended.    RIGHT DIGITAL DIAGNOSTIC MAMMOGRAM WITH TOMOSYNTHESIS, RIGHT BREAST ULTRASOUND LIMITED (2/28/2024)  FINDINGS:  The oval, circumscribed, equal density mass visualized in the right lateral breast on the patient's recent screening mammogram persists on the current diagnostic views. On the current study, the mass is identified at the 9 o'clock position, at the junction of the middle and posterior thirds of the breast in depth, measuring 0.7 x 0.5 x 0.5 cm mammographically. On the cc view, the masses fairly superficial, at the junction of the glandular tissue with the subcutaneous fat and measures approximately 9 cm from the nipple. On the full-field true lateral view, the distance from the nipple is approximately 7 cm. Ultrasound was performed for further evaluation.  ULTRASOUND:    Ultrasound targeted to the 9 o'clock position, 7 cm from the nipple demonstrates an oval, partially circumscribed and partially indistinct mass, hypoechoic and parallel to the glandular tissue. Peripheral blood flow is noted. Sonographically, the mass measures 0.6 x 0.4 x 0.6 cm. The sonographic appearance correlates with the mammogram. There are no other suspicious solid or cystic lesions.  IMPRESSION:  New right breast mass in the lateral breast at the 9 o'clock position, 7 cm from the nipple measuring up to 0.6 cm is indeterminate. An ultrasound-guided biopsy is recommended. The mass and need for biopsy were discussed with the patient at the completion of her ultrasound. The patient expressed her understanding of  the recommendation for biopsy and agrees with this plan.    ULTRASOUND GUIDED MAMMOTOME VACUUM ASSISTED RIGHT BREAST (3/13/2024)  BIOPSY WITH PLACEMENT OF A METALLIC CLIP  IMPRESSION:  Technically successful ultrasound guided Mammotome vacuum assisted right breast biopsy with placement of a metallic clip. The pathology result has returned as benign. Routine clinical and imaging follow-up is recommended.    PATHOLOGY:  Right breast, 9:00, 7 cm from nipple, ultrasound-guided biopsies for mass:               -Clustered apocrine cysts with associated florid ductal hyperplasia, usual type and duct ectasia.               -No atypical hyperplasia, in situ nor invasive carcinoma identified    ASSESSMENT & PLAN  Ms. Matthews is a 68-year-old lady with a recently identified abnormality of the right lateral breast found on mammography.  She underwent spot compression mammogram imaging and targeted right breast ultrasound which showed a 6 mm oval circumscribed mass with biopsies returning benign.  I went over the biopsy results with her and reassured her there was no evidence for atypical ductal hyperplasia, in situ carcinoma, invasive carcinoma or radial scar.  She can continue on with yearly screening mammograms and does not require any surgical intervention or any closer mammographic follow-up.    Dorina Rehman MD  General, Robotic, and Endoscopic Surgery  [facility]    [address], [address]  [place] [address]  P: [phone]  F: [phone]

## 2024-05-24 RX ORDER — DULOXETIN HYDROCHLORIDE 60 MG/1
60 CAPSULE, DELAYED RELEASE ORAL DAILY
Qty: 90 CAPSULE | Refills: 0 | Status: SHIPPED | OUTPATIENT
Start: 2024-05-24

## 2024-05-28 RX ORDER — DULOXETIN HYDROCHLORIDE 60 MG/1
60 CAPSULE, DELAYED RELEASE ORAL DAILY
Qty: 90 CAPSULE | Refills: 1 | Status: SHIPPED | OUTPATIENT
Start: 2024-05-28

## 2024-07-30 ENCOUNTER — OFFICE VISIT (OUTPATIENT)
Dept: FAMILY MEDICINE CLINIC | Facility: CLINIC | Age: 69
End: 2024-07-30
Payer: MEDICARE

## 2024-07-30 VITALS
SYSTOLIC BLOOD PRESSURE: 116 MMHG | WEIGHT: 212 LBS | HEIGHT: 65 IN | OXYGEN SATURATION: 96 % | TEMPERATURE: 98 F | BODY MASS INDEX: 35.32 KG/M2 | DIASTOLIC BLOOD PRESSURE: 72 MMHG | HEART RATE: 97 BPM

## 2024-07-30 DIAGNOSIS — E55.9 VITAMIN D DEFICIENCY: ICD-10-CM

## 2024-07-30 DIAGNOSIS — I10 ESSENTIAL HYPERTENSION: ICD-10-CM

## 2024-07-30 DIAGNOSIS — R73.03 PREDIABETES: ICD-10-CM

## 2024-07-30 DIAGNOSIS — E78.00 HYPERCHOLESTEROLEMIA: ICD-10-CM

## 2024-07-30 DIAGNOSIS — F41.8 MIXED ANXIETY DEPRESSIVE DISORDER: Primary | ICD-10-CM

## 2024-07-30 PROCEDURE — 1159F MED LIST DOCD IN RCRD: CPT | Performed by: INTERNAL MEDICINE

## 2024-07-30 PROCEDURE — G2211 COMPLEX E/M VISIT ADD ON: HCPCS | Performed by: INTERNAL MEDICINE

## 2024-07-30 PROCEDURE — 99214 OFFICE O/P EST MOD 30 MIN: CPT | Performed by: INTERNAL MEDICINE

## 2024-07-30 PROCEDURE — 1126F AMNT PAIN NOTED NONE PRSNT: CPT | Performed by: INTERNAL MEDICINE

## 2024-07-30 PROCEDURE — 1160F RVW MEDS BY RX/DR IN RCRD: CPT | Performed by: INTERNAL MEDICINE

## 2024-07-30 NOTE — PROGRESS NOTES
"Answers submitted by the patient for this visit:  Other (Submitted on 7/23/2024)  Please describe your symptoms.: Bloodwork for bloodsugar  Have you had these symptoms before?: Yes  How long have you been having these symptoms?: Greater than 2 weeks  Please list any medications you are currently taking for this condition.: Metformin  Please describe any probable cause for these symptoms. : Overeating  Primary Reason for Visit (Submitted on 7/23/2024)  What is the primary reason for your visit?: Other  Chief Complaint  Hypertension    Subjective        Emerita Matthews presents to [facility]  History of Present Illness  Here today to f/u on HTN on HCTZ 12.5 mg qd, anxiety and depression on cymbalta 60 mg qd and prn atarax 10 mg qd.  HL on lipitor 10 mg qd and prediabetes on metformin  mg qd.  She lost 13 pounds and then has gained it back over the past 4 mo.  She states she knows how to lose weight but has not followed the same diet recently.  She did join a gym and is exercising.  Past 2 weekends, her left anterior LN in neck have been tender.  She does state that she swims on Saturdays.  No drainage from her ear.  No change in her hearing.  No recent sore throat.  She denies dehydration.  She does not drink alcohol on a regular basis.  Objective   Vital Signs:  /72 (BP Location: Right arm, Patient Position: Sitting, Cuff Size: Adult)   Pulse 97   Temp 98 °F (36.7 °C) (Temporal)   Ht 165.1 cm (65\")   Wt 96.2 kg (212 lb)   SpO2 96%   BMI 35.28 kg/m²   Estimated body mass index is 35.28 kg/m² as calculated from the following:    Height as of this encounter: 165.1 cm (65\").    Weight as of this encounter: 96.2 kg (212 lb).               Physical Exam  Vitals and nursing note reviewed.   Constitutional:       Appearance: Normal appearance. She is well-developed.   HENT:      Head: Normocephalic and atraumatic.      Right Ear: Tympanic membrane, ear canal and external ear " normal.      Left Ear: Tympanic membrane, ear canal and external ear normal.      Ears:      Comments: Right TM specks of white with normal clear background     Nose: Nose normal.      Mouth/Throat:      Mouth: Mucous membranes are moist.   Eyes:      Extraocular Movements: Extraocular movements intact.      Conjunctiva/sclera: Conjunctivae normal.   Neck:      Vascular: No carotid bruit.   Cardiovascular:      Rate and Rhythm: Normal rate and regular rhythm.      Heart sounds: Normal heart sounds.      Comments: No bruits  Pulmonary:      Effort: Pulmonary effort is normal. No respiratory distress.      Breath sounds: Normal breath sounds. No stridor. No wheezing, rhonchi or rales.   Chest:      Chest wall: No tenderness.   Abdominal:      General: Bowel sounds are normal. There is no distension.      Palpations: Abdomen is soft. There is no mass.      Tenderness: There is no abdominal tenderness. There is no guarding or rebound.      Hernia: No hernia is present.   Musculoskeletal:      Cervical back: Neck supple.   Lymphadenopathy:      Cervical: No cervical adenopathy.   Skin:     General: Skin is warm.   Neurological:      General: No focal deficit present.      Mental Status: She is alert and oriented to person, place, and time. Mental status is at baseline.   Psychiatric:         Mood and Affect: Mood normal.         Behavior: Behavior normal.         Thought Content: Thought content normal.         Judgment: Judgment normal.        Result Review :                     Assessment and Plan     Diagnoses and all orders for this visit:    1. Mixed anxiety depressive disorder (Primary)  -     Hemoglobin A1c; Future  -     Comprehensive Metabolic Panel; Future  -     CBC & Differential; Future  -     Lipid Panel With LDL / HDL Ratio; Future  -     Vitamin D,25-Hydroxy; Future  -     TSH; Future  -     T4, Free; Future  -     Microalbumin / Creatinine Urine Ratio - Urine, Clean Catch; Future    2. Vitamin D  deficiency  -     Vitamin D,25-Hydroxy; Future    3. Essential hypertension  -     Hemoglobin A1c; Future  -     Comprehensive Metabolic Panel; Future  -     CBC & Differential; Future  -     Lipid Panel With LDL / HDL Ratio; Future  -     Vitamin D,25-Hydroxy; Future  -     TSH; Future  -     T4, Free; Future  -     Microalbumin / Creatinine Urine Ratio - Urine, Clean Catch; Future    4. Hypercholesterolemia  -     Hemoglobin A1c; Future  -     Comprehensive Metabolic Panel; Future  -     CBC & Differential; Future  -     Lipid Panel With LDL / HDL Ratio; Future  -     Vitamin D,25-Hydroxy; Future  -     TSH; Future  -     T4, Free; Future  -     Microalbumin / Creatinine Urine Ratio - Urine, Clean Catch; Future    5. Prediabetes  -     Hemoglobin A1c; Future      Patient will do labs today and that I have ordered labs for future 6-month follow-up.  For now she will continue the Lipitor for hyperlipidemia.  Continue HCTZ for hypertension.  Continue metformin for prediabetes.  Check A1c.  Lifestyle modifications recommended.  Yearly eye exam recommended.  On exam I do not see any cause for the soreness or tenderness of the lymph nodes on the left neck.  My guess is that with swimming it has aggravated her ear and then that causes some's tenderness or soreness.  Today's exam is completely normal.       Follow Up     Return in about 6 months (around 1/30/2025) for Recheck.  Patient was given instructions and counseling regarding her condition or for health maintenance advice. Please see specific information pulled into the AVS if appropriate.

## 2024-08-19 RX ORDER — ATORVASTATIN CALCIUM 10 MG/1
10 TABLET, FILM COATED ORAL DAILY
Qty: 90 TABLET | Refills: 3 | Status: SHIPPED | OUTPATIENT
Start: 2024-08-19

## 2024-08-20 ENCOUNTER — OFFICE VISIT (OUTPATIENT)
Dept: SURGERY | Facility: CLINIC | Age: 69
End: 2024-08-20
Payer: MEDICARE

## 2024-08-20 VITALS
SYSTOLIC BLOOD PRESSURE: 130 MMHG | DIASTOLIC BLOOD PRESSURE: 90 MMHG | BODY MASS INDEX: 35.52 KG/M2 | WEIGHT: 213.2 LBS | HEIGHT: 65 IN

## 2024-08-20 DIAGNOSIS — K43.2 RECURRENT VENTRAL INCISIONAL HERNIA: Primary | ICD-10-CM

## 2024-08-20 PROCEDURE — 1160F RVW MEDS BY RX/DR IN RCRD: CPT | Performed by: SURGERY

## 2024-08-20 PROCEDURE — 1159F MED LIST DOCD IN RCRD: CPT | Performed by: SURGERY

## 2024-08-20 PROCEDURE — 99213 OFFICE O/P EST LOW 20 MIN: CPT | Performed by: SURGERY

## 2024-09-04 NOTE — PROGRESS NOTES
General Surgery  Established Patient Office Visit    CC: Recurrent periumbilical hernia    HPI: The patient is a pleasant 69 y.o. year-old lady who returns to see me today for a recurrent infraumbilical hernia.  I performed an open periumbilical hernia repair with mesh in December 2023 for a 4 cm right periumbilical hernia.  She noticed a recurrent bulge beneath and to the right of her umbilicus about 2 months ago.  She denies any pain, nausea, or vomiting and has had regular bowel movements.  The hernia has been reducible since she first noticed it.  She has had no imaging workup on her abdomen since I last operated on her in December 2023.    Past Medical History:  Asthma  Kidney stones  Hypertension  Type 2 diabetes     Past Surgical History:  Open periumbilical hernia repair with mesh (December 2023 by me)  Laparoscopic cholecystectomy (July 2023 by me)  Colonoscopy (2016, Dr. 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     Family History: Mother with history of COPD and bladder cancer, father with progressive supranuclear palsy, maternal grandmother with history of breast cancer, daughter with history of gallbladder disease    ROS:   Constitutional: Negative for fevers or chills  HENT: Negative for hearing loss or runny nose  Eyes: Negative for vision changes or scleral icterus  Respiratory: Negative for cough or shortness of breath  Cardiovascular: Negative for chest pain or  heart palpitations  Gastrointestinal: Positive for abdominal pain; negative for abdominal distension, nausea, vomiting, constipation, melena, or hematochezia  Genitourinary: Negative for hematuria or dysuria  Musculoskeletal: Negative for joint swelling or gait instability  Neurologic: Negative for tremors or seizures  Psychiatric: Negative for suicidal ideations or agitation  All other systems reviewed and negative    Physical Exam:  Height: 165 cm  Weight: 96 kg  BMI: 35  General: No acute distress, well-nourished & well-developed  HEAD: normocephalic, atraumatic  EYES: normal conjunctiva, sclera anicteric  EARS: grossly normal hearing  NECK: supple, no thyromegaly  CARDIOVASCULAR: regular rate and rhythm  RESPIRATORY: clear to auscultation bilaterally  GASTROINTESTINAL: soft, nontender, non-distended, there is a reducible bulge beneath and to the right of her umbilicus at the site of her previous open umbilical hernia repair, with no overlying skin erythema or fluctuance  MUSCULOSKELETAL: normal gait and station. No gross extremity abnormalities  PSYCHIATRIC: oriented x3, normal mood and affect    IMAGING:  Nothing recent    ASSESSMENT & PLAN  Ms. Matthews is a 69-year-old lady with a recurrent periumbilical hernia, reducible.  I would recommend we check a CT abdomen/pelvis to better assess the size of the fascial defect, determine the hernia contents, and determine the best operative approach for fixing this.  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  I will call her with the CT results once available to me.    Dorina Rehman MD  General, Robotic, and Endoscopic Surgery  [facility]    [address], [address]  [place] [address]  P: [phone]  F: [phone]

## 2024-09-10 ENCOUNTER — HOSPITAL ENCOUNTER (OUTPATIENT)
Dept: CT IMAGING | Facility: HOSPITAL | Age: 69
Discharge: HOME | End: 2024-09-10
Admitting: SURGERY
Payer: MEDICARE

## 2024-09-10 DIAGNOSIS — K43.2 RECURRENT VENTRAL INCISIONAL HERNIA: ICD-10-CM

## 2024-09-10 LAB — CREAT BLDA-MCNC: 1 MG/DL (ref 0.6–1.3)

## 2024-09-10 PROCEDURE — 74177 CT ABD & PELVIS W/CONTRAST: CPT

## 2024-09-10 PROCEDURE — 25510000001 IOPAMIDOL 61 % SOLUTION: Performed by: SURGERY

## 2024-09-10 PROCEDURE — 82565 ASSAY OF CREATININE: CPT

## 2024-09-10 RX ORDER — IOPAMIDOL 612 MG/ML
100 INJECTION, SOLUTION INTRAVASCULAR
Status: COMPLETED | OUTPATIENT
Start: 2024-09-10 | End: 2024-09-10

## 2024-09-10 RX ADMIN — IOPAMIDOL 85 ML: 612 INJECTION, SOLUTION INTRAVENOUS at 14:35

## 2024-09-19 ENCOUNTER — TELEPHONE (OUTPATIENT)
Dept: SURGERY | Facility: CLINIC | Age: 69
End: 2024-09-19
Payer: MEDICARE

## 2024-09-20 ENCOUNTER — PREP FOR SURGERY (OUTPATIENT)
Dept: OTHER | Facility: HOSPITAL | Age: 69
End: 2024-09-20
Payer: MEDICARE

## 2024-09-20 DIAGNOSIS — K43.2 RECURRENT VENTRAL INCISIONAL HERNIA: Primary | ICD-10-CM

## 2024-09-23 PROBLEM — K43.2 RECURRENT VENTRAL INCISIONAL HERNIA: Status: ACTIVE | Noted: 2024-09-20

## 2024-10-17 ENCOUNTER — TELEPHONE (OUTPATIENT)
Dept: SURGERY | Facility: CLINIC | Age: 69
End: 2024-10-17
Payer: MEDICARE

## 2024-10-17 NOTE — TELEPHONE ENCOUNTER
Caller: Emerita Matthews    Relationship: Self    Best call back number: [phone]  DETAILED MESSAGE MAY BE LEFT ON VM    What form or medical record are you requesting: OFFICE NOTES & IMAGING RELATING TO HERNIA    Who is requesting this form or medical record from you: DR HDZ WITH [facility]     How would you like to receive the form or medical records (pick-up, mail, fax): FAX  If fax, what is the fax number: [phone] ATTN DR HDZ    Timeframe paperwork needed: ASAP    Additional notes: PATIENT STATES IS SEEK A SECOND OPINION FROM DR HDZ

## 2024-11-13 ENCOUNTER — TELEPHONE (OUTPATIENT)
Dept: SURGERY | Facility: CLINIC | Age: 69
End: 2024-11-13
Payer: MEDICARE

## 2024-11-13 DIAGNOSIS — K43.2 RECURRENT VENTRAL INCISIONAL HERNIA: Primary | ICD-10-CM

## 2024-11-13 DIAGNOSIS — Z01.818 PREOPERATIVE GENERAL PHYSICAL EXAMINATION: ICD-10-CM

## 2024-11-13 NOTE — TELEPHONE ENCOUNTER
Patient left a voicemail on the surgery line stating that Dr. Shepard @  recommended she contact Dr. Rehman regarding seeing a physical therapist prior to surgery and post operatively for core strengthing. She states she is a member at [facility].

## 2024-11-18 NOTE — TELEPHONE ENCOUNTER
Voicemail left relaying Dr. Stevens's response. Patient to call back with any additional questions.

## 2024-11-18 NOTE — TELEPHONE ENCOUNTER
Sure, I have placed an outpatient referral to physical therapy.  I am unsure how to direct that to any the physical therapists at [facility] where she is already a member.  If she wants to take the printed out physical copy of the PT order, she could probably go to [facility] and inquire if they could use that to initiate preop strength training.

## 2024-12-17 ENCOUNTER — TREATMENT (OUTPATIENT)
Dept: PHYSICAL THERAPY | Facility: CLINIC | Age: 69
End: 2024-12-17
Payer: MEDICARE

## 2024-12-17 DIAGNOSIS — Z01.818 PREOPERATIVE GENERAL PHYSICAL EXAMINATION: ICD-10-CM

## 2024-12-17 DIAGNOSIS — K43.2 RECURRENT VENTRAL INCISIONAL HERNIA: Primary | ICD-10-CM

## 2024-12-17 NOTE — PROGRESS NOTES
"  [facility] Initial Evaluation and Plan of Care    Patient Name: Emerita Matthews          :  1955  Referring Physician: Dorina Rehman MD  Diagnosis: Recurrent ventral incisional hernia [K43.2]    Date of Evaluation: 2024  ______________________________________________________________________    Subjective Evaluation    History of Present Illness  Mechanism of injury: Had a Open periumbilical hernia repair with mesh in 2023 for a 4 cm right periumbilical hernia, but started noticing a \"softball -sized hernia again -and now - Scheduled for a Laparoscopic robotic-assisted ventral/incisional hernia repair with mesh on 2025.     Wanting core strengthening -   A member at [facility] - classes, equipment, TRX -      PMHX:   Open periumbilical hernia repair with mesh in 2023 for a 4 cm right periumbilical hernia  Recurrent ventral incisional hernia  Asthma  Kidney stones  Hypertension  Type 2 diabetes  Closed traumatic dislocation of proximal interphalangeal joint w/ PT for same;         Pain  Current pain ratin  At worst pain ratin    Treatments  No previous or current treatments  Patient Goals  Patient/family treatment goals: HEP of core strengthening prior to hernia surgery 2025 -       ___________________________________________________  Objective          Postural Observations    Additional Postural Observation Details  Ant pelvic tilt - Level pelvis - No obvious deformity or atrophy -   Normal gait -     Active Range of Motion     Additional Active Range of Motion Details  L-T spine WFL w/o pain     Strength/Myotome Testing     Additional Strength Details  LE myotomes grossly WNL -   ABDOMINALS grossly weak - 3+/5     Tests     Additional Tests Details  (-) SLR, Hip testing -         TREATMENT:   EXERCISES:   [x]   TRANSVERSE ABDOMINUS ISOMETRICS 20/10 sec ea  [x]   HL w/ SMALL MARCH w/ CORE/TA STABILIZATION x 2 min  [x]   BRIDGES on HEELS 15/5 sec " ea  [x]   CRUNCH w/ TA STABILIZATION 15   [x]   DIAGONAL CRUNCH w/ TA STABILIZATION 10 ea R/L   [x]   KNEELING PLANKS 6 x 30 sec ea  [x]   BIRD DOGS 15 ea  []     []     [x]   FABRICATED HEP and REVIEWED w/ PATIENT    FUNCTIONAL ACTIVITIES:   TAPING / BRACING: NA  ANATOMY / DYSFUNCTION EDUCATION  Jt protection, ADL modification; Posture and Ergonomics / Body Mechanics  ___________________________________________________  Assessment & Plan       Assessment  Impairments: lacks appropriate home exercise program   Assessment details: 68 yo female; Recurrent ventral incisional hernia; Scheduled for a Laparoscopic robotic-assisted ventral/incisional hernia repair with mesh on 1/08/2025.    GOALS:   1 VISIT:   1) INSTRUCTED IN CORE STABILIZATION PROGRAM  2) Pt Demonstrates (I) w/ HEP     ALL GOALS MET  Prognosis: good    Plan  Planned therapy interventions: abdominal trunk stabilization, flexibility, home exercise program, strengthening, stretching, postural training, neuromuscular re-education and therapeutic activities (Modalities prn; Taping prn;)  Duration in visits: 1  Treatment plan discussed with: patient  Plan details: DC PT and CONTINUE w/ HEP up to upcoming surgery date -       ___________________________________________________  Manual Therapy:         mins  12637;  Therapeutic Exercise:    20     mins  74836;     Neuromuscular Carl:        mins  27125;    Therapeutic Activity:     10     mins  99684;   Self Care:                           mins  03987  Ultrasound:          mins  75980;  Iontophoresis:          mins  86825;    Electrical Stimulation:         mins  14872 ( );  Mechanical Traction:          mins  39753  Dry Needling          mins self-pay    Eval:   20   mins    Timed Treatment:   30   mins                  Total Treatment:     50   mins    PT SIGNATURE:     Sony Thomas, PT  DATE TREATMENT INITIATED: 12/17/2024  ___________________________________________________  Initial  Certification  Certification Period: 3/17/2025  I certify that the therapy services are furnished while this patient is under my care.  The services outlined above are required by this patient, and will be reviewed every 90 days.     PHYSICIAN: ________________________________  DATE: ______  Dorina Rehman MD        Please sign and return via fax to [phone].. Thank you, [facility].  ______________________________________________________________________  [address] [address]  Phone: [phone] Fax: [phone]

## 2024-12-27 RX ORDER — DULOXETIN HYDROCHLORIDE 60 MG/1
60 CAPSULE, DELAYED RELEASE ORAL DAILY
Qty: 90 CAPSULE | Refills: 1 | Status: SHIPPED | OUTPATIENT
Start: 2024-12-27

## 2025-01-02 ENCOUNTER — PRE-ADMISSION TESTING (OUTPATIENT)
Dept: PREADMISSION TESTING | Facility: HOSPITAL | Age: 70
End: 2025-01-02
Payer: MEDICARE

## 2025-01-02 VITALS
HEART RATE: 88 BPM | RESPIRATION RATE: 18 BRPM | SYSTOLIC BLOOD PRESSURE: 124 MMHG | WEIGHT: 216 LBS | HEIGHT: 65 IN | BODY MASS INDEX: 35.99 KG/M2 | TEMPERATURE: 97.7 F | DIASTOLIC BLOOD PRESSURE: 88 MMHG | OXYGEN SATURATION: 96 %

## 2025-01-02 DIAGNOSIS — K43.2 RECURRENT VENTRAL INCISIONAL HERNIA: ICD-10-CM

## 2025-01-02 LAB
ANION GAP SERPL CALCULATED.3IONS-SCNC: 9 MMOL/L (ref 5–15)
BASOPHILS # BLD AUTO: 0.02 10*3/MM3 (ref 0–0.2)
BASOPHILS NFR BLD AUTO: 0.3 % (ref 0–1.5)
BUN SERPL-MCNC: 19 MG/DL (ref 8–23)
BUN/CREAT SERPL: 20.9 (ref 7–25)
CALCIUM SPEC-SCNC: 9.8 MG/DL (ref 8.6–10.5)
CHLORIDE SERPL-SCNC: 100 MMOL/L (ref 98–107)
CO2 SERPL-SCNC: 28 MMOL/L (ref 22–29)
CREAT SERPL-MCNC: 0.91 MG/DL (ref 0.57–1)
DEPRECATED RDW RBC AUTO: 40.4 FL (ref 37–54)
EGFRCR SERPLBLD CKD-EPI 2021: 68.4 ML/MIN/1.73
EOSINOPHIL # BLD AUTO: 0.37 10*3/MM3 (ref 0–0.4)
EOSINOPHIL NFR BLD AUTO: 5.2 % (ref 0.3–6.2)
ERYTHROCYTE [DISTWIDTH] IN BLOOD BY AUTOMATED COUNT: 13 % (ref 12.3–15.4)
GLUCOSE SERPL-MCNC: 104 MG/DL (ref 65–99)
HCT VFR BLD AUTO: 45.2 % (ref 34–46.6)
HGB BLD-MCNC: 15.5 G/DL (ref 12–15.9)
IMM GRANULOCYTES # BLD AUTO: 0.02 10*3/MM3 (ref 0–0.05)
IMM GRANULOCYTES NFR BLD AUTO: 0.3 % (ref 0–0.5)
LYMPHOCYTES # BLD AUTO: 2.14 10*3/MM3 (ref 0.7–3.1)
LYMPHOCYTES NFR BLD AUTO: 29.9 % (ref 19.6–45.3)
MCH RBC QN AUTO: 29.6 PG (ref 26.6–33)
MCHC RBC AUTO-ENTMCNC: 34.3 G/DL (ref 31.5–35.7)
MCV RBC AUTO: 86.4 FL (ref 79–97)
MONOCYTES # BLD AUTO: 0.73 10*3/MM3 (ref 0.1–0.9)
MONOCYTES NFR BLD AUTO: 10.2 % (ref 5–12)
NEUTROPHILS NFR BLD AUTO: 3.88 10*3/MM3 (ref 1.7–7)
NEUTROPHILS NFR BLD AUTO: 54.1 % (ref 42.7–76)
NRBC BLD AUTO-RTO: 0 /100 WBC (ref 0–0.2)
PLATELET # BLD AUTO: 230 10*3/MM3 (ref 140–450)
PMV BLD AUTO: 8.8 FL (ref 6–12)
POTASSIUM SERPL-SCNC: 4.7 MMOL/L (ref 3.5–5.2)
RBC # BLD AUTO: 5.23 10*6/MM3 (ref 3.77–5.28)
SODIUM SERPL-SCNC: 137 MMOL/L (ref 136–145)
WBC NRBC COR # BLD AUTO: 7.16 10*3/MM3 (ref 3.4–10.8)

## 2025-01-02 PROCEDURE — 93005 ELECTROCARDIOGRAM TRACING: CPT

## 2025-01-02 PROCEDURE — 85025 COMPLETE CBC W/AUTO DIFF WBC: CPT

## 2025-01-02 PROCEDURE — 36415 COLL VENOUS BLD VENIPUNCTURE: CPT

## 2025-01-02 PROCEDURE — 80048 BASIC METABOLIC PNL TOTAL CA: CPT

## 2025-01-02 NOTE — DISCHARGE INSTRUCTIONS
Take the following medications the morning of surgery:    NO MEDS      If you are on prescription narcotic pain medication to control your pain you may also take that medication the morning of surgery.      General Instructions:     Do not eat solid food after midnight the night before surgery.  Clear liquids day of surgery are allowed but must be stopped at least two hours before your hospital arrival time.       Allowed clear liquids      Water, sodas, and tea or coffee with no cream or milk added.       12 to 20 ounces of a clear liquid that contains carbohydrates is recommended.  If non-diabetic, have Gatorade or Powerade.  If diabetic, have G2 or Powerade Zero.     Do not have liquids red in color.  Do not consume chicken, beef, pork or vegetable broth or bouillon cubes of any variety as they are not considered clear liquids and are not allowed.      Infants may have breast milk up to four hours before surgery.  Infants drinking formula may drink formula up to six hours before surgery.   Patients who avoid smoking, chewing tobacco and alcohol for 4 weeks prior to surgery have a reduced risk of post-operative complications.  Quit smoking as many days before surgery as you can.  Do not smoke, use chewing tobacco or drink alcohol the day of surgery.   If applicable bring your C-PAP/ BI-PAP machine in with you to preop day of surgery.  Bring any papers given to you in the doctor’s office.  Wear clean comfortable clothes.  Do not wear contact lenses, false eyelashes or make-up.  Bring a case for your glasses.   Bring crutches or walker if applicable.  Remove all piercings.  Leave jewelry and any other valuables at home.  Hair extensions with metal clips must be removed prior to surgery.  The Pre-Admission Testing nurse will instruct you to bring medications if unable to obtain an accurate list in Pre-Admission Testing.            Preventing a Surgical Site Infection:  For 2 to 3 days before surgery, avoid shaving  with a razor because the razor can irritate skin and make it easier to develop an infection.    Any areas of open skin can increase the risk of a post-operative wound infection by allowing bacteria to enter and travel throughout the body.  Notify your surgeon if you have any skin wounds / rashes even if it is not near the expected surgical site.  The area will need assessed to determine if surgery should be delayed until it is healed.  The night prior to surgery shower using a fresh bar of anti-bacterial soap (such as Dial) and clean washcloth.  Sleep in a clean bed with clean clothing.  Do not allow pets to sleep with you.  Shower on the morning of surgery using a fresh bar of anti-bacterial soap (such as Dial) and clean washcloth.  Dry with a clean towel and dress in clean clothing.  Ask your surgeon if you will be receiving antibiotics prior to surgery.  Make sure you, your family, and all healthcare providers clean their hands with soap and water or an alcohol based hand  before caring for you or your wound.    Day of surgery:  Your arrival time is approximately two hours before your scheduled surgery time.  Please note if you have an early arrival time the surgery doors do not open before 5:00 AM.  Upon arrival, a Pre-op nurse and Anesthesiologist will review your health history, obtain vital signs, and answer questions you may have.  The only belongings needed at this time will be a list of your home medications and if applicable your C-PAP/BI-PAP machine.  A Pre-op nurse will start an IV and you may receive medication in preparation for surgery, including something to help you relax.     Please be aware that surgery does come with discomfort.  We want to make every effort to control your discomfort so please discuss any uncontrolled symptoms with your nurse.   Your doctor will most likely have prescribed pain medications.      If you are going home after surgery you will receive individualized  written care instructions before being discharged.  A responsible adult must drive you to and from the hospital on the day of your surgery and ideally stay with you through the night.   .  Discharge prescriptions can be filled by the hospital pharmacy during regular pharmacy hours.  If you are having surgery late in the day/evening your prescription may be e-prescribed to your pharmacy.  Please verify your pharmacy hours or chose a 24 hour pharmacy to avoid not having access to your prescription because your pharmacy has closed for the day.    If you are staying overnight following surgery, you will be transported to your hospital room following the recovery period.  [facility] has all private rooms.    If you have any questions please call Pre-Admission Testing at [phone].  Deductibles and co-payments are collected on the day of service. Please be prepared to pay the required co-pay, deductible or deposit on the day of service as defined by your plan.    Call your surgeon immediately if you experience any of the following symptoms:  Sore Throat  Shortness of Breath or difficulty breathing  Cough  Chills  Body soreness or muscle pain  Headache  Fever  New loss of taste or smell  Do not arrive for your surgery ill.  Your procedure will need to be rescheduled to another time.  You will need to call your physician before the day of surgery to avoid any unnecessary exposure to hospital staff as well as other patients.  CHLORHEXIDINE CLOTH INSTRUCTIONS  The morning of surgery follow these instructions using the Chlorhexidine cloths you've been given.  These steps reduce bacteria on the body.  Do not use the cloths near your eyes, ears mouth, genitalia or on open wounds.  Throw the cloths away after use but do not try to flush them down a toilet.      Open and remove one cloth at a time from the package.    Leave the cloth unfolded and begin the bathing.  Massage the skin with the cloths using gentle  pressure to remove bacteria.  Do not scrub harshly.   Follow the steps below with one 2% CHG cloth per area (6 total cloths).  One cloth for neck, shoulders and chest.  One cloth for both arms, hands, fingers and underarms (do underarms last).  One cloth for the abdomen followed by groin.  One cloth for right leg and foot including between the toes.  One cloth for left leg and foot including between the toes.  The last cloth is to be used for the back of the neck, back and buttocks.    Allow the CHG to air dry 3 minutes on the skin which will give it time to work and decrease the chance of irritation.  The skin may feel sticky until it is dry.  Do not rinse with water or any other liquid or you will lose the beneficial effects of the CHG.  If mild skin irritation occurs, do rinse the skin to remove the CHG.  Report this to the nurse at time of admission.  Do not apply lotions, creams, ointments, deodorants or perfumes after using the clothes. Dress in clean clothes before coming to the hospital.

## 2025-01-03 LAB
QT INTERVAL: 407 MS
QTC INTERVAL: 482 MS

## 2025-01-08 ENCOUNTER — HOSPITAL ENCOUNTER (OUTPATIENT)
Facility: HOSPITAL | Age: 70
Setting detail: HOSPITAL OUTPATIENT SURGERY
Discharge: HOME | End: 2025-01-08
Attending: SURGERY | Admitting: SURGERY
Payer: MEDICARE

## 2025-01-08 ENCOUNTER — ANESTHESIA (OUTPATIENT)
Dept: PERIOP | Facility: HOSPITAL | Age: 70
End: 2025-01-08
Payer: MEDICARE

## 2025-01-08 ENCOUNTER — ANESTHESIA EVENT (OUTPATIENT)
Dept: PERIOP | Facility: HOSPITAL | Age: 70
End: 2025-01-08
Payer: MEDICARE

## 2025-01-08 VITALS
OXYGEN SATURATION: 93 % | SYSTOLIC BLOOD PRESSURE: 119 MMHG | TEMPERATURE: 97.7 F | RESPIRATION RATE: 16 BRPM | DIASTOLIC BLOOD PRESSURE: 81 MMHG | HEART RATE: 91 BPM

## 2025-01-08 DIAGNOSIS — K43.2 RECURRENT VENTRAL INCISIONAL HERNIA: Primary | ICD-10-CM

## 2025-01-08 PROCEDURE — 25010000002 SUGAMMADEX 200 MG/2ML SOLUTION: Performed by: NURSE ANESTHETIST, CERTIFIED REGISTERED

## 2025-01-08 PROCEDURE — 25010000002 KETOROLAC TROMETHAMINE PER 15 MG: Performed by: NURSE ANESTHETIST, CERTIFIED REGISTERED

## 2025-01-08 PROCEDURE — S0260 H&P FOR SURGERY: HCPCS | Performed by: SURGERY

## 2025-01-08 PROCEDURE — 49615 RPR AA HRN RCR 3-10 RDC: CPT | Performed by: SURGERY

## 2025-01-08 PROCEDURE — 25010000002 MAGNESIUM SULFATE PER 500 MG OF MAGNESIUM: Performed by: ANESTHESIOLOGY

## 2025-01-08 PROCEDURE — 25010000002 FENTANYL CITRATE (PF) 50 MCG/ML SOLUTION: Performed by: ANESTHESIOLOGY

## 2025-01-08 PROCEDURE — 88302 TISSUE EXAM BY PATHOLOGIST: CPT | Performed by: SURGERY

## 2025-01-08 PROCEDURE — S2900 ROBOTIC SURGICAL SYSTEM: HCPCS | Performed by: SURGERY

## 2025-01-08 PROCEDURE — 25010000002 PHENYLEPHRINE 10 MG/ML SOLUTION: Performed by: ANESTHESIOLOGY

## 2025-01-08 PROCEDURE — 25010000002 CEFAZOLIN PER 500 MG: Performed by: SURGERY

## 2025-01-08 PROCEDURE — 25010000002 PROPOFOL 200 MG/20ML EMULSION: Performed by: ANESTHESIOLOGY

## 2025-01-08 PROCEDURE — 25010000002 MIDAZOLAM PER 1 MG: Performed by: ANESTHESIOLOGY

## 2025-01-08 PROCEDURE — 25010000002 ONDANSETRON PER 1 MG: Performed by: NURSE ANESTHETIST, CERTIFIED REGISTERED

## 2025-01-08 PROCEDURE — 25010000002 DEXAMETHASONE SODIUM PHOSPHATE 20 MG/5ML SOLUTION: Performed by: ANESTHESIOLOGY

## 2025-01-08 PROCEDURE — 25810000003 LACTATED RINGERS PER 1000 ML: Performed by: ANESTHESIOLOGY

## 2025-01-08 PROCEDURE — 25010000002 ESMOLOL 100 MG/10ML SOLUTION: Performed by: ANESTHESIOLOGY

## 2025-01-08 PROCEDURE — 25010000002 HYDROMORPHONE PER 4 MG: Performed by: ANESTHESIOLOGY

## 2025-01-08 PROCEDURE — C1781 MESH (IMPLANTABLE): HCPCS | Performed by: SURGERY

## 2025-01-08 DEVICE — VENTRALIGHT ST MESH, 4.5" (11.4 CM), CIRCLE
Type: IMPLANTABLE DEVICE | Site: ABDOMEN | Status: FUNCTIONAL
Brand: VENTRALIGHT

## 2025-01-08 DEVICE — KNOTLESS TISSUE CONTROL DEVICE, UNDYED UNIDIRECTIONAL (ANTIBACTERIAL) SYNTHETIC ABSORBABLE DEVICE
Type: IMPLANTABLE DEVICE | Site: ABDOMEN | Status: FUNCTIONAL
Brand: STRATAFIX

## 2025-01-08 DEVICE — VIOLET ANTIBACTERIAL POLYDIOXANONE, KNOTLESS TISSUE CONTROL DEVICE
Type: IMPLANTABLE DEVICE | Site: ABDOMEN | Status: FUNCTIONAL
Brand: STRATAFIX

## 2025-01-08 RX ORDER — HYDROCODONE BITARTRATE AND ACETAMINOPHEN 5; 325 MG/1; MG/1
1 TABLET ORAL ONCE AS NEEDED
Status: DISCONTINUED | OUTPATIENT
Start: 2025-01-08 | End: 2025-01-08 | Stop reason: HOSPADM

## 2025-01-08 RX ORDER — FLUMAZENIL 0.1 MG/ML
0.2 INJECTION INTRAVENOUS AS NEEDED
Status: DISCONTINUED | OUTPATIENT
Start: 2025-01-08 | End: 2025-01-08 | Stop reason: HOSPADM

## 2025-01-08 RX ORDER — KETOROLAC TROMETHAMINE 30 MG/ML
INJECTION, SOLUTION INTRAMUSCULAR; INTRAVENOUS AS NEEDED
Status: DISCONTINUED | OUTPATIENT
Start: 2025-01-08 | End: 2025-01-08 | Stop reason: SURG

## 2025-01-08 RX ORDER — OXYCODONE AND ACETAMINOPHEN 5; 325 MG/1; MG/1
1 TABLET ORAL EVERY 4 HOURS PRN
Qty: 15 TABLET | Refills: 0 | Status: SHIPPED | OUTPATIENT
Start: 2025-01-08

## 2025-01-08 RX ORDER — FAMOTIDINE 10 MG/ML
20 INJECTION, SOLUTION INTRAVENOUS ONCE
Status: COMPLETED | OUTPATIENT
Start: 2025-01-08 | End: 2025-01-08

## 2025-01-08 RX ORDER — ALBUTEROL SULFATE 90 UG/1
INHALANT RESPIRATORY (INHALATION) AS NEEDED
Status: DISCONTINUED | OUTPATIENT
Start: 2025-01-08 | End: 2025-01-08 | Stop reason: SURG

## 2025-01-08 RX ORDER — BUPIVACAINE HYDROCHLORIDE AND EPINEPHRINE 5; 5 MG/ML; UG/ML
INJECTION, SOLUTION PERINEURAL AS NEEDED
Status: DISCONTINUED | OUTPATIENT
Start: 2025-01-08 | End: 2025-01-08 | Stop reason: HOSPADM

## 2025-01-08 RX ORDER — ESMOLOL HYDROCHLORIDE 10 MG/ML
INJECTION INTRAVENOUS AS NEEDED
Status: DISCONTINUED | OUTPATIENT
Start: 2025-01-08 | End: 2025-01-08 | Stop reason: SURG

## 2025-01-08 RX ORDER — EPHEDRINE SULFATE 50 MG/ML
5 INJECTION, SOLUTION INTRAVENOUS ONCE AS NEEDED
Status: DISCONTINUED | OUTPATIENT
Start: 2025-01-08 | End: 2025-01-08 | Stop reason: HOSPADM

## 2025-01-08 RX ORDER — ONDANSETRON 2 MG/ML
INJECTION INTRAMUSCULAR; INTRAVENOUS AS NEEDED
Status: DISCONTINUED | OUTPATIENT
Start: 2025-01-08 | End: 2025-01-08 | Stop reason: SURG

## 2025-01-08 RX ORDER — DEXAMETHASONE SODIUM PHOSPHATE 4 MG/ML
INJECTION, SOLUTION INTRA-ARTICULAR; INTRALESIONAL; INTRAMUSCULAR; INTRAVENOUS; SOFT TISSUE AS NEEDED
Status: DISCONTINUED | OUTPATIENT
Start: 2025-01-08 | End: 2025-01-08 | Stop reason: SURG

## 2025-01-08 RX ORDER — FENTANYL CITRATE 50 UG/ML
50 INJECTION, SOLUTION INTRAMUSCULAR; INTRAVENOUS
Status: DISCONTINUED | OUTPATIENT
Start: 2025-01-08 | End: 2025-01-08 | Stop reason: HOSPADM

## 2025-01-08 RX ORDER — IPRATROPIUM BROMIDE AND ALBUTEROL SULFATE 2.5; .5 MG/3ML; MG/3ML
3 SOLUTION RESPIRATORY (INHALATION) ONCE AS NEEDED
Status: DISCONTINUED | OUTPATIENT
Start: 2025-01-08 | End: 2025-01-08 | Stop reason: HOSPADM

## 2025-01-08 RX ORDER — ROCURONIUM BROMIDE 10 MG/ML
INJECTION, SOLUTION INTRAVENOUS AS NEEDED
Status: DISCONTINUED | OUTPATIENT
Start: 2025-01-08 | End: 2025-01-08 | Stop reason: SURG

## 2025-01-08 RX ORDER — KETAMINE HCL IN NACL, ISO-OSM 100MG/10ML
SYRINGE (ML) INJECTION AS NEEDED
Status: DISCONTINUED | OUTPATIENT
Start: 2025-01-08 | End: 2025-01-08 | Stop reason: SURG

## 2025-01-08 RX ORDER — SODIUM CHLORIDE, SODIUM LACTATE, POTASSIUM CHLORIDE, CALCIUM CHLORIDE 600; 310; 30; 20 MG/100ML; MG/100ML; MG/100ML; MG/100ML
INJECTION, SOLUTION INTRAVENOUS CONTINUOUS PRN
Status: DISCONTINUED | OUTPATIENT
Start: 2025-01-08 | End: 2025-01-08 | Stop reason: SURG

## 2025-01-08 RX ORDER — PROPOFOL 10 MG/ML
INJECTION, EMULSION INTRAVENOUS AS NEEDED
Status: DISCONTINUED | OUTPATIENT
Start: 2025-01-08 | End: 2025-01-08 | Stop reason: SURG

## 2025-01-08 RX ORDER — SODIUM CHLORIDE 0.9 % (FLUSH) 0.9 %
3-10 SYRINGE (ML) INJECTION AS NEEDED
Status: DISCONTINUED | OUTPATIENT
Start: 2025-01-08 | End: 2025-01-08 | Stop reason: HOSPADM

## 2025-01-08 RX ORDER — HYDROCODONE BITARTRATE AND ACETAMINOPHEN 7.5; 325 MG/1; MG/1
1 TABLET ORAL EVERY 4 HOURS PRN
Status: DISCONTINUED | OUTPATIENT
Start: 2025-01-08 | End: 2025-01-08 | Stop reason: HOSPADM

## 2025-01-08 RX ORDER — FENTANYL CITRATE 50 UG/ML
INJECTION, SOLUTION INTRAMUSCULAR; INTRAVENOUS AS NEEDED
Status: DISCONTINUED | OUTPATIENT
Start: 2025-01-08 | End: 2025-01-08 | Stop reason: SURG

## 2025-01-08 RX ORDER — PHENYLEPHRINE HYDROCHLORIDE 10 MG/ML
INJECTION INTRAVENOUS AS NEEDED
Status: DISCONTINUED | OUTPATIENT
Start: 2025-01-08 | End: 2025-01-08 | Stop reason: SURG

## 2025-01-08 RX ORDER — FENTANYL CITRATE 50 UG/ML
50 INJECTION, SOLUTION INTRAMUSCULAR; INTRAVENOUS ONCE AS NEEDED
Status: DISCONTINUED | OUTPATIENT
Start: 2025-01-08 | End: 2025-01-08 | Stop reason: HOSPADM

## 2025-01-08 RX ORDER — HYDRALAZINE HYDROCHLORIDE 20 MG/ML
5 INJECTION INTRAMUSCULAR; INTRAVENOUS
Status: DISCONTINUED | OUTPATIENT
Start: 2025-01-08 | End: 2025-01-08 | Stop reason: HOSPADM

## 2025-01-08 RX ORDER — SODIUM CHLORIDE, SODIUM LACTATE, POTASSIUM CHLORIDE, CALCIUM CHLORIDE 600; 310; 30; 20 MG/100ML; MG/100ML; MG/100ML; MG/100ML
9 INJECTION, SOLUTION INTRAVENOUS CONTINUOUS
Status: DISCONTINUED | OUTPATIENT
Start: 2025-01-08 | End: 2025-01-08 | Stop reason: HOSPADM

## 2025-01-08 RX ORDER — LABETALOL HYDROCHLORIDE 5 MG/ML
5 INJECTION, SOLUTION INTRAVENOUS
Status: DISCONTINUED | OUTPATIENT
Start: 2025-01-08 | End: 2025-01-08 | Stop reason: HOSPADM

## 2025-01-08 RX ORDER — PROMETHAZINE HYDROCHLORIDE 25 MG/1
25 TABLET ORAL ONCE AS NEEDED
Status: DISCONTINUED | OUTPATIENT
Start: 2025-01-08 | End: 2025-01-08 | Stop reason: HOSPADM

## 2025-01-08 RX ORDER — MAGNESIUM SULFATE HEPTAHYDRATE 500 MG/ML
INJECTION, SOLUTION INTRAMUSCULAR; INTRAVENOUS AS NEEDED
Status: DISCONTINUED | OUTPATIENT
Start: 2025-01-08 | End: 2025-01-08 | Stop reason: SURG

## 2025-01-08 RX ORDER — HYDROMORPHONE HYDROCHLORIDE 1 MG/ML
0.5 INJECTION, SOLUTION INTRAMUSCULAR; INTRAVENOUS; SUBCUTANEOUS
Status: DISCONTINUED | OUTPATIENT
Start: 2025-01-08 | End: 2025-01-08 | Stop reason: HOSPADM

## 2025-01-08 RX ORDER — ONDANSETRON 2 MG/ML
4 INJECTION INTRAMUSCULAR; INTRAVENOUS ONCE AS NEEDED
Status: DISCONTINUED | OUTPATIENT
Start: 2025-01-08 | End: 2025-01-08 | Stop reason: HOSPADM

## 2025-01-08 RX ORDER — MEPERIDINE HYDROCHLORIDE 25 MG/ML
12.5 INJECTION INTRAMUSCULAR; INTRAVENOUS; SUBCUTANEOUS
Status: DISCONTINUED | OUTPATIENT
Start: 2025-01-08 | End: 2025-01-08 | Stop reason: HOSPADM

## 2025-01-08 RX ORDER — DIPHENHYDRAMINE HYDROCHLORIDE 50 MG/ML
12.5 INJECTION INTRAMUSCULAR; INTRAVENOUS
Status: DISCONTINUED | OUTPATIENT
Start: 2025-01-08 | End: 2025-01-08 | Stop reason: HOSPADM

## 2025-01-08 RX ORDER — SODIUM CHLORIDE 0.9 % (FLUSH) 0.9 %
3 SYRINGE (ML) INJECTION EVERY 12 HOURS SCHEDULED
Status: DISCONTINUED | OUTPATIENT
Start: 2025-01-08 | End: 2025-01-08 | Stop reason: HOSPADM

## 2025-01-08 RX ORDER — MIDAZOLAM HYDROCHLORIDE 1 MG/ML
0.5 INJECTION, SOLUTION INTRAMUSCULAR; INTRAVENOUS
Status: COMPLETED | OUTPATIENT
Start: 2025-01-08 | End: 2025-01-08

## 2025-01-08 RX ORDER — LIDOCAINE HYDROCHLORIDE 10 MG/ML
0.5 INJECTION, SOLUTION INFILTRATION; PERINEURAL ONCE AS NEEDED
Status: DISCONTINUED | OUTPATIENT
Start: 2025-01-08 | End: 2025-01-08 | Stop reason: HOSPADM

## 2025-01-08 RX ORDER — PROMETHAZINE HYDROCHLORIDE 25 MG/1
25 SUPPOSITORY RECTAL ONCE AS NEEDED
Status: DISCONTINUED | OUTPATIENT
Start: 2025-01-08 | End: 2025-01-08 | Stop reason: HOSPADM

## 2025-01-08 RX ORDER — NALOXONE HCL 0.4 MG/ML
0.2 VIAL (ML) INJECTION AS NEEDED
Status: DISCONTINUED | OUTPATIENT
Start: 2025-01-08 | End: 2025-01-08 | Stop reason: HOSPADM

## 2025-01-08 RX ADMIN — PHENYLEPHRINE HYDROCHLORIDE 100 MCG: 10 INJECTION INTRAVENOUS at 13:29

## 2025-01-08 RX ADMIN — DEXAMETHASONE SODIUM PHOSPHATE 8 MG: 4 INJECTION, SOLUTION INTRAMUSCULAR; INTRAVENOUS at 13:05

## 2025-01-08 RX ADMIN — ONDANSETRON 4 MG: 2 INJECTION INTRAMUSCULAR; INTRAVENOUS at 14:19

## 2025-01-08 RX ADMIN — SODIUM CHLORIDE 2000 MG: 900 INJECTION INTRAVENOUS at 12:50

## 2025-01-08 RX ADMIN — MIDAZOLAM 0.5 MG: 1 INJECTION INTRAMUSCULAR; INTRAVENOUS at 12:16

## 2025-01-08 RX ADMIN — PROPOFOL 160 MG: 10 INJECTION, EMULSION INTRAVENOUS at 13:00

## 2025-01-08 RX ADMIN — Medication 10 MG: at 13:57

## 2025-01-08 RX ADMIN — ALBUTEROL SULFATE 6 PUFF: 90 AEROSOL, METERED RESPIRATORY (INHALATION) at 14:31

## 2025-01-08 RX ADMIN — HYDROCODONE BITARTRATE AND ACETAMINOPHEN 1 TABLET: 7.5; 325 TABLET ORAL at 15:27

## 2025-01-08 RX ADMIN — ESMOLOL HYDROCHLORIDE 30 MG: 100 INJECTION, SOLUTION INTRAVENOUS at 13:04

## 2025-01-08 RX ADMIN — FAMOTIDINE 20 MG: 10 INJECTION INTRAVENOUS at 12:11

## 2025-01-08 RX ADMIN — PROPOFOL 40 MG: 10 INJECTION, EMULSION INTRAVENOUS at 13:03

## 2025-01-08 RX ADMIN — ROCURONIUM BROMIDE 50 MG: 10 INJECTION, SOLUTION INTRAVENOUS at 13:00

## 2025-01-08 RX ADMIN — PHENYLEPHRINE HYDROCHLORIDE 100 MCG: 10 INJECTION INTRAVENOUS at 13:13

## 2025-01-08 RX ADMIN — FENTANYL CITRATE 50 MCG: 50 INJECTION, SOLUTION INTRAMUSCULAR; INTRAVENOUS at 13:00

## 2025-01-08 RX ADMIN — Medication 20 MG: at 13:12

## 2025-01-08 RX ADMIN — HYDROMORPHONE HYDROCHLORIDE 0.5 MG: 1 INJECTION, SOLUTION INTRAMUSCULAR; INTRAVENOUS; SUBCUTANEOUS at 15:14

## 2025-01-08 RX ADMIN — SODIUM CHLORIDE, POTASSIUM CHLORIDE, SODIUM LACTATE AND CALCIUM CHLORIDE: 600; 310; 30; 20 INJECTION, SOLUTION INTRAVENOUS at 12:55

## 2025-01-08 RX ADMIN — MAGNESIUM SULFATE HEPTAHYDRATE 2 G: 500 INJECTION, SOLUTION INTRAMUSCULAR; INTRAVENOUS at 13:07

## 2025-01-08 RX ADMIN — SODIUM CHLORIDE, POTASSIUM CHLORIDE, SODIUM LACTATE AND CALCIUM CHLORIDE 9 ML/HR: 600; 310; 30; 20 INJECTION, SOLUTION INTRAVENOUS at 12:11

## 2025-01-08 RX ADMIN — MIDAZOLAM 0.5 MG: 1 INJECTION INTRAMUSCULAR; INTRAVENOUS at 12:11

## 2025-01-08 RX ADMIN — KETOROLAC TROMETHAMINE 30 MG: 30 INJECTION, SOLUTION INTRAMUSCULAR; INTRAVENOUS at 14:22

## 2025-01-08 RX ADMIN — SUGAMMADEX 200 MG: 100 INJECTION, SOLUTION INTRAVENOUS at 14:22

## 2025-01-08 RX ADMIN — ROCURONIUM BROMIDE 20 MG: 10 INJECTION, SOLUTION INTRAVENOUS at 13:41

## 2025-01-08 NOTE — H&P
General Surgery  History & Physical    CC: Recurrent periumbilical hernia    HPI: The patient is a pleasant 69 y.o. year-old lady who returns to see me today for a recurrent infraumbilical hernia.  I performed an open periumbilical hernia repair with mesh in December 2023 for a 4 cm right periumbilical hernia.  She noticed a recurrent bulge beneath and to the right of her umbilicus in July 2024.  She denies any pain, nausea, or vomiting and has had regular bowel movements.  The hernia has been reducible since she first noticed it.  After I saw her in the office last fall, I checked a CT abd/pelvis that showed the recurrent periumbilical hernia contained fat with a 2.4 cm fascial defect.     Past Medical History:  Asthma  Kidney stones  Hypertension  Type 2 diabetes     Past Surgical History:  Open periumbilical hernia repair with mesh (December 2023 by me)  Laparoscopic cholecystectomy (July 2023 by me)  Colonoscopy (2016, Dr. 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     Family History: Mother with history of COPD and bladder cancer, father with progressive supranuclear palsy, maternal grandmother with history of breast cancer, daughter with history of gallbladder disease    ROS:   Constitutional: Negative for fevers or chills  HENT: Negative for hearing loss or runny nose  Eyes: Negative for vision changes or scleral icterus  Respiratory: Negative for cough or shortness of breath  Cardiovascular: Negative for chest pain or heart  palpitations  Gastrointestinal: Positive for abdominal pain; negative for abdominal distension, nausea, vomiting, constipation, melena, or hematochezia  Genitourinary: Negative for hematuria or dysuria  Musculoskeletal: Negative for joint swelling or gait instability  Neurologic: Negative for tremors or seizures  Psychiatric: Negative for suicidal ideations or agitation  All other systems reviewed and negative    Physical Exam:  There were no vitals filed for this visit.  Height: 165 cm  Weight: 98 kg  BMI: 35.94  General: No acute distress, well-nourished & well-developed  HEAD: normocephalic, atraumatic  EYES: normal conjunctiva, sclera anicteric  EARS: grossly normal hearing  NECK: supple, no thyromegaly  CARDIOVASCULAR: regular rate and rhythm  RESPIRATORY: clear to auscultation bilaterally  GASTROINTESTINAL: soft, nontender, non-distended, there is a reducible bulge beneath and to the right of her umbilicus at the site of her previous open umbilical hernia repair, with no overlying skin erythema or fluctuance  MUSCULOSKELETAL: normal gait and station. No gross extremity abnormalities  PSYCHIATRIC: oriented x3, normal mood and affect    IMAGING:  CT ABD/PELVIS (Sept 2024):  IMPRESSION:  Small fat-containing right periumbilical hernia with mild underlying the fat stranding. Correlate for reducibility    ASSESSMENT & PLAN  Ms. Matthews is a 69-year-old lady with a recurrent periumbilical hernia, reducible.  I have recommended proceeding with laparoscopic robotic-assisted recurrent incisional hernia repair with mesh today. She has been counseled on the risks of the procedure which include but are not limited to bleeding, wound infection, seroma or hematoma development, recurrent hernia development, etc. Despite the risks of surgery, she has consented to proceed.    Dorina Rehman MD  General, Robotic, and Endoscopic Surgery  [facility]    [address], [address]  [place] [address]  P:  [phone]  F: [phone]

## 2025-01-08 NOTE — ANESTHESIA PREPROCEDURE EVALUATION
Anesthesia Evaluation     Patient summary reviewed and Nursing notes reviewed   no history of anesthetic complications:   NPO Solid Status: > 8 hours  NPO Liquid Status: > 2 hours           Airway   Dental - normal exam     Pulmonary    (+) asthma,  Cardiovascular   Exercise tolerance: good (4-7 METS)    ECG reviewed  Rhythm: regular    (+) hypertension, hyperlipidemia    ROS comment:  BORDERLINE ECG -  Sinus rhythm  Borderline  repolarization abnormality  No change from previous tracing      Neuro/Psych  (+) headaches, psychiatric history  GI/Hepatic/Renal/Endo    (+) obesity, GI bleeding , liver disease fatty liver disease, renal disease-, diabetes mellitus ((pre))    Musculoskeletal     (+) neck pain  Abdominal   (+) obese   Substance History - negative use     OB/GYN negative ob/gyn ROS         Other                    Anesthesia Plan    ASA 3     general     (LMP  (LMP Unknown)     I have reviewed the patient's history with the patient and the chart, including all pertinent laboratory results and imaging. I have explained the risks of anesthesia including but not limited to dental damage, corneal abrasion, nerve injury, MI, stroke, and death.    )  intravenous induction     Anesthetic plan, risks, benefits, and alternatives have been provided, discussed and informed consent has been obtained with: patient.    CODE STATUS:

## 2025-01-08 NOTE — ANESTHESIA PROCEDURE NOTES
Airway  Urgency: elective    Date/Time: 1/8/2025 1:03 PM  Airway not difficult    General Information and Staff    Patient location during procedure: OR  Anesthesiologist: Rossana Burnett MD    Indications and Patient Condition  Indications for airway management: airway protection    Preoxygenated: yes  MILS maintained throughout  Mask difficulty assessment: 1 - vent by mask    Final Airway Details  Final airway type: endotracheal airway      Successful airway: ETT  Cuffed: yes   Successful intubation technique: direct laryngoscopy  Endotracheal tube insertion site: oral  Blade: Ed  Blade size: 3  ETT size (mm): 7.0  Cormack-Lehane Classification: grade I - full view of glottis  Placement verified by: chest auscultation and capnometry   Measured from: teeth  Number of attempts at approach: 1  Assessment: lips, teeth, and gum same as pre-op and atraumatic intubation

## 2025-01-08 NOTE — OP NOTE
Operative Note :  Dorina Rehman MD      Emerita Matthews  1955    Procedure Date: 01/08/25    Pre-op Diagnosis:  Recurrent ventral incisional hernia [K43.2]    Post-Operative Diagnosis:  Recurrent ventral incisional hernia (4 cm x 3 cm, reducible)    Procedure:   Laparoscopic robotic assisted recurrent ventral/incisional hernia repair with mesh    Surgeon: Dorina Rehman MD    Assistant: Jean Newell CSA (Jean was responsible for suctioning, retracting, suturing of all surgical incisions, and application of sterile dressings at the completion of the case)    Anesthesia:  General (general endotracheal tube)    Estimated Blood Loss: minimal    Specimens: Hernia sac    Complications: None    Indications:  The patient is a 69-year-old lady who underwent an open periumbilical/incisional hernia repair in December 2023 and noticed a recurrent infraumbilical hernia in July of last year.  She presents today for laparoscopic robotic assisted repair of the recurrent periumbilical hernia with mesh.  She has been counseled on the risks of the procedure which include but are not limited to bleeding, wound infection, recurrent hernia development, possible mesh complications such as infection or erosion into the bowel, etc.  Despite these risks, she has consented to proceed.    Findings: 3 cm x 4 cm infraumbilical hernia defect    Description of procedure:  The patient was brought to the operating room and placed on the OR table in supine position with her arms tucked at her side.  An endotracheal tube was inserted and general anesthesia induced.  The abdominal wall was prepped and draped in a sterile fashion after insertion of a Iraheta catheter using sterile technique.  A surgical timeout was completed.  In the left upper quadrant, a 5 mm incision was made after instillation of 0.5% Marcaine with epinephrine.  Access was then gained into the abdomen via an Optiview approach at Connell's point in the left upper  quadrant.  The abdomen was insufflated and the abdominal contents inspected.  There was no evidence for injury from trocar placement.  Under direct visualization, I placed 2 of the 8 mm robotic trocars in the far left lateral location and left lower quadrant.  The 5 mm trocar in the left upper quadrant was exchanged for an 8 mm robotic trocar.  The robot was then docked and I went to the robotic console to complete the dissection.  In the periumbilical region, there was extensive omental scarring up to her previous abdominal wall mesh.  The omentum was taken down using sharp scissor dissection and electrocautery to divide between small blood vessels attached to the hernia sac.  This revealed a 3 cm x 4 cm infraumbilical hernia defect.  Her previously placed Ventralex mesh had clearly come loose at the inferior pole but was well incorporated into the peritoneal lining with no exposed mesh anywhere.  I attempted to get into the preperitoneal space starting out laterally on the left side, but found the peritoneum was too scarred around her previous mesh.  I therefore elected to take down the hernia sac by dividing the peritoneum at the edges of the hernia defect circumferentially with electrocautery.  The hernia sac was then slowly reduced from the infraumbilical hernia space and  off of overlying subcutaneous fat using electrocautery.  A 12 mm assist port was then placed in the left upper quadrant and the hernia sac was removed and sent off to pathology in formalin through the assist port.  The fascial defect was then closed primarily with decreased level of insufflation pressures by reapproximating the fascial edges using 0 PDS Stratafix running suture running at forward and back upon itself.  A circular piece of polypropylene ventralight ST mesh was then cut to an 8 cm diameter [place] and inserted into the abdominal cavity with the smooth side facing down against the abdominal viscera and the rough side  placed against the peritoneum as an underlay.  This was secured in all 4 quadrants using 0 Vicryl sutures.  The circumferential edges of the mesh were secured with a running dolphin whipstitch to the peritoneum using 2-0 Monocryl barbed Stratafix suture.  All needles were then removed from the abdominal cavity and accounted for on the back table.  The 12 mm assist port was closed using the M-close device and a 0 Vicryl transfascial suture.  All trocars were removed and the abdomen was desufflated.  All skin incisions were closed using 4-0 Vicryl subcuticular suture and topical Exofin glue.  She was then awoken from anesthesia, her Iraheta catheter was removed, and she transferred to PACU in stable condition with all counts correct per nursing.    Dorina Rehman MD  General, Robotic, and Endoscopic Surgery  [facility]    [address], [address]  [place] [address]  P: [phone]  F: [phone]

## 2025-01-09 LAB
CYTO UR: NORMAL
LAB AP CASE REPORT: NORMAL
PATH REPORT.FINAL DX SPEC: NORMAL
PATH REPORT.GROSS SPEC: NORMAL

## 2025-01-09 NOTE — ANESTHESIA POSTPROCEDURE EVALUATION
Patient: Emerita Matthews    Procedure Summary       Date: 01/08/25 Room / Location: [facility] OR [address] [place] [address] MAIN OR    Anesthesia Start: 1255 Anesthesia Stop: 1443    Procedure: Laparoscopic robotic-assisted ventral/incisional hernia repair with mesh (Abdomen) Diagnosis:       Recurrent ventral incisional hernia      (Recurrent ventral incisional hernia [K43.2])    Surgeons: Dorina Rehman MD Provider: Rossana Burnett MD    Anesthesia Type: general ASA Status: 3            Anesthesia Type: general    Vitals  Vitals Value Taken Time   /70 01/08/25 1530   Temp 36.5 °C (97.7 °F) 01/08/25 1530   Pulse 92 01/08/25 1534   Resp 16 01/08/25 1530   SpO2 92 % 01/08/25 1534   Vitals shown include unfiled device data.        Post Anesthesia Care and Evaluation    Patient location during evaluation: bedside  Patient participation: complete - patient participated  Level of consciousness: awake  Pain management: adequate    Airway patency: patent  Anesthetic complications: No anesthetic complications    Cardiovascular status: acceptable  Respiratory status: acceptable  Hydration status: acceptable    Comments: /81   Pulse 91   Temp 36.5 °C (97.7 °F) (Oral)   Resp 16   LMP  (LMP Unknown)   SpO2 93%

## 2025-01-21 ENCOUNTER — OFFICE VISIT (OUTPATIENT)
Dept: SURGERY | Facility: CLINIC | Age: 70
End: 2025-01-21
Payer: MEDICARE

## 2025-01-21 VITALS
OXYGEN SATURATION: 99 % | HEART RATE: 88 BPM | HEIGHT: 65 IN | SYSTOLIC BLOOD PRESSURE: 120 MMHG | DIASTOLIC BLOOD PRESSURE: 98 MMHG | BODY MASS INDEX: 35.29 KG/M2 | WEIGHT: 211.8 LBS

## 2025-01-21 DIAGNOSIS — Z09 SURGICAL FOLLOWUP: Primary | ICD-10-CM

## 2025-01-21 NOTE — PROGRESS NOTES
CHIEF COMPLAINT:   Chief Complaint   Patient presents with    Post-op     Laparoscopic robotic-assisted ventral/incisional hernia repair with mesh 01/08/25       HISTORY OF PRESENT ILLNESS:  This is a 69 y.o. female who presents for a post-operative visit after undergoing laparoscopic robotic assisted repair of a recurrent periumbilical hernia with mesh on 1/8/2025.  She complains of mild tenderness to palpation between the second and third left lateral abdominal wall incisions that comes and goes.  She also has had transient right upper quadrant lateral pains at times.  She denies any pain around the umbilicus where her hernia was repaired.    Pathology:   Hernia sac, herniorrhaphy:               -Benign fibrous serous membrane with adherent adipose, consistent with hernia sac.    PHYSICAL EXAM:  Lungs: Clear  Heart: RRR  ABD: Incisions are healing well without any erythema or signs of infection, periumbilical firmness consistent with postoperative hematoma versus seroma, no overlying skin erythema or fluctuance  Ext: no significant edema, calves nontender    A/P:  This is a 69 y.o. female patient who is S/P laparoscopic robotic assisted repair of a recurrent periumbilical hernia with mesh on 1/8/2025    She is healing well but appears to have developed either a postoperative seroma or hematoma in the right periumbilical region.  She should continue to avoid any heavy lifting, specifically nothing over 15 pounds for another 6 weeks.  I would like to see her back in 6 weeks time for recheck of the periumbilical fluid collection.  She should call in the interim with any fevers, chills, or erythema of the overlying skin.    Dorina Rehman MD  General, Robotic, and Endoscopic Surgery  [facility]    [address], [address]  [place] [address]  P: [phone]  F: [phone]

## 2025-01-25 ENCOUNTER — APPOINTMENT (OUTPATIENT)
Dept: CT IMAGING | Facility: HOSPITAL | Age: 70
End: 2025-01-25
Payer: MEDICARE

## 2025-01-25 ENCOUNTER — HOSPITAL ENCOUNTER (EMERGENCY)
Facility: HOSPITAL | Age: 70
Discharge: HOME | End: 2025-01-25
Attending: EMERGENCY MEDICINE
Payer: MEDICARE

## 2025-01-25 VITALS
WEIGHT: 209 LBS | BODY MASS INDEX: 34.82 KG/M2 | TEMPERATURE: 97.5 F | HEIGHT: 65 IN | DIASTOLIC BLOOD PRESSURE: 79 MMHG | OXYGEN SATURATION: 98 % | SYSTOLIC BLOOD PRESSURE: 118 MMHG | HEART RATE: 75 BPM | RESPIRATION RATE: 18 BRPM

## 2025-01-25 DIAGNOSIS — R10.9 ABDOMINAL PAIN, UNSPECIFIED ABDOMINAL LOCATION: Primary | ICD-10-CM

## 2025-01-25 DIAGNOSIS — N39.0 URINARY TRACT INFECTION WITHOUT HEMATURIA, SITE UNSPECIFIED: ICD-10-CM

## 2025-01-25 LAB
ALBUMIN SERPL-MCNC: 4.1 G/DL (ref 3.5–5.2)
ALBUMIN/GLOB SERPL: 1.4 G/DL
ALP SERPL-CCNC: 67 U/L (ref 39–117)
ALT SERPL W P-5'-P-CCNC: 16 U/L (ref 1–33)
ANION GAP SERPL CALCULATED.3IONS-SCNC: 11 MMOL/L (ref 5–15)
AST SERPL-CCNC: 16 U/L (ref 1–32)
BACTERIA UR QL AUTO: ABNORMAL /HPF
BASOPHILS # BLD AUTO: 0.02 10*3/MM3 (ref 0–0.2)
BASOPHILS NFR BLD AUTO: 0.3 % (ref 0–1.5)
BILIRUB SERPL-MCNC: 0.5 MG/DL (ref 0–1.2)
BILIRUB UR QL STRIP: NEGATIVE
BUN SERPL-MCNC: 22 MG/DL (ref 8–23)
BUN/CREAT SERPL: 25.3 (ref 7–25)
CALCIUM SPEC-SCNC: 9.2 MG/DL (ref 8.6–10.5)
CHLORIDE SERPL-SCNC: 101 MMOL/L (ref 98–107)
CLARITY UR: CLEAR
CO2 SERPL-SCNC: 27 MMOL/L (ref 22–29)
COLOR UR: YELLOW
CREAT SERPL-MCNC: 0.87 MG/DL (ref 0.57–1)
DEPRECATED RDW RBC AUTO: 41.1 FL (ref 37–54)
EGFRCR SERPLBLD CKD-EPI 2021: 72.2 ML/MIN/1.73
EOSINOPHIL # BLD AUTO: 0.26 10*3/MM3 (ref 0–0.4)
EOSINOPHIL NFR BLD AUTO: 3.5 % (ref 0.3–6.2)
ERYTHROCYTE [DISTWIDTH] IN BLOOD BY AUTOMATED COUNT: 12.8 % (ref 12.3–15.4)
GLOBULIN UR ELPH-MCNC: 2.9 GM/DL
GLUCOSE SERPL-MCNC: 131 MG/DL (ref 65–99)
GLUCOSE UR STRIP-MCNC: NEGATIVE MG/DL
HCT VFR BLD AUTO: 43.9 % (ref 34–46.6)
HGB BLD-MCNC: 14.8 G/DL (ref 12–15.9)
HGB UR QL STRIP.AUTO: NEGATIVE
HYALINE CASTS UR QL AUTO: ABNORMAL /LPF
IMM GRANULOCYTES # BLD AUTO: 0.02 10*3/MM3 (ref 0–0.05)
IMM GRANULOCYTES NFR BLD AUTO: 0.3 % (ref 0–0.5)
KETONES UR QL STRIP: NEGATIVE
LEUKOCYTE ESTERASE UR QL STRIP.AUTO: ABNORMAL
LIPASE SERPL-CCNC: 31 U/L (ref 13–60)
LYMPHOCYTES # BLD AUTO: 2.29 10*3/MM3 (ref 0.7–3.1)
LYMPHOCYTES NFR BLD AUTO: 30.6 % (ref 19.6–45.3)
MCH RBC QN AUTO: 29.5 PG (ref 26.6–33)
MCHC RBC AUTO-ENTMCNC: 33.7 G/DL (ref 31.5–35.7)
MCV RBC AUTO: 87.6 FL (ref 79–97)
MONOCYTES # BLD AUTO: 0.61 10*3/MM3 (ref 0.1–0.9)
MONOCYTES NFR BLD AUTO: 8.1 % (ref 5–12)
NEUTROPHILS NFR BLD AUTO: 4.29 10*3/MM3 (ref 1.7–7)
NEUTROPHILS NFR BLD AUTO: 57.2 % (ref 42.7–76)
NITRITE UR QL STRIP: POSITIVE
NRBC BLD AUTO-RTO: 0 /100 WBC (ref 0–0.2)
PH UR STRIP.AUTO: 5.5 [PH] (ref 5–8)
PLATELET # BLD AUTO: 265 10*3/MM3 (ref 140–450)
PMV BLD AUTO: 9.3 FL (ref 6–12)
POTASSIUM SERPL-SCNC: 3.4 MMOL/L (ref 3.5–5.2)
PROT SERPL-MCNC: 7 G/DL (ref 6–8.5)
PROT UR QL STRIP: NEGATIVE
RBC # BLD AUTO: 5.01 10*6/MM3 (ref 3.77–5.28)
RBC # UR STRIP: ABNORMAL /HPF
REF LAB TEST METHOD: ABNORMAL
SODIUM SERPL-SCNC: 139 MMOL/L (ref 136–145)
SP GR UR STRIP: 1.02 (ref 1–1.03)
SQUAMOUS #/AREA URNS HPF: ABNORMAL /HPF
UROBILINOGEN UR QL STRIP: ABNORMAL
WBC # UR STRIP: ABNORMAL /HPF
WBC NRBC COR # BLD AUTO: 7.49 10*3/MM3 (ref 3.4–10.8)

## 2025-01-25 PROCEDURE — 25810000003 SODIUM CHLORIDE 0.9 % SOLUTION: Performed by: EMERGENCY MEDICINE

## 2025-01-25 PROCEDURE — 96365 THER/PROPH/DIAG IV INF INIT: CPT

## 2025-01-25 PROCEDURE — 25010000002 CEFTRIAXONE PER 250 MG: Performed by: EMERGENCY MEDICINE

## 2025-01-25 PROCEDURE — 74177 CT ABD & PELVIS W/CONTRAST: CPT

## 2025-01-25 PROCEDURE — 81001 URINALYSIS AUTO W/SCOPE: CPT | Performed by: EMERGENCY MEDICINE

## 2025-01-25 PROCEDURE — 99285 EMERGENCY DEPT VISIT HI MDM: CPT

## 2025-01-25 PROCEDURE — 85025 COMPLETE CBC W/AUTO DIFF WBC: CPT | Performed by: EMERGENCY MEDICINE

## 2025-01-25 PROCEDURE — 87086 URINE CULTURE/COLONY COUNT: CPT | Performed by: EMERGENCY MEDICINE

## 2025-01-25 PROCEDURE — 87186 SC STD MICRODIL/AGAR DIL: CPT | Performed by: EMERGENCY MEDICINE

## 2025-01-25 PROCEDURE — 83690 ASSAY OF LIPASE: CPT | Performed by: EMERGENCY MEDICINE

## 2025-01-25 PROCEDURE — 25510000001 IOPAMIDOL 61 % SOLUTION: Performed by: EMERGENCY MEDICINE

## 2025-01-25 PROCEDURE — 87088 URINE BACTERIA CULTURE: CPT | Performed by: EMERGENCY MEDICINE

## 2025-01-25 PROCEDURE — 80053 COMPREHEN METABOLIC PANEL: CPT | Performed by: EMERGENCY MEDICINE

## 2025-01-25 RX ORDER — MORPHINE SULFATE 2 MG/ML
2 INJECTION, SOLUTION INTRAMUSCULAR; INTRAVENOUS ONCE
Status: DISCONTINUED | OUTPATIENT
Start: 2025-01-25 | End: 2025-01-25 | Stop reason: HOSPADM

## 2025-01-25 RX ORDER — IOPAMIDOL 612 MG/ML
100 INJECTION, SOLUTION INTRAVASCULAR
Status: COMPLETED | OUTPATIENT
Start: 2025-01-25 | End: 2025-01-25

## 2025-01-25 RX ORDER — ONDANSETRON 2 MG/ML
4 INJECTION INTRAMUSCULAR; INTRAVENOUS ONCE
Status: DISCONTINUED | OUTPATIENT
Start: 2025-01-25 | End: 2025-01-25 | Stop reason: HOSPADM

## 2025-01-25 RX ORDER — NAPROXEN 500 MG/1
500 TABLET ORAL 2 TIMES DAILY PRN
Qty: 20 TABLET | Refills: 0 | Status: SHIPPED | OUTPATIENT
Start: 2025-01-25

## 2025-01-25 RX ORDER — SODIUM CHLORIDE 0.9 % (FLUSH) 0.9 %
10 SYRINGE (ML) INJECTION AS NEEDED
Status: DISCONTINUED | OUTPATIENT
Start: 2025-01-25 | End: 2025-01-25 | Stop reason: HOSPADM

## 2025-01-25 RX ORDER — CEPHALEXIN 500 MG/1
500 CAPSULE ORAL 2 TIMES DAILY
Qty: 10 CAPSULE | Refills: 0 | Status: SHIPPED | OUTPATIENT
Start: 2025-01-25 | End: 2025-01-30

## 2025-01-25 RX ADMIN — CEFTRIAXONE 2000 MG: 2 INJECTION, POWDER, FOR SOLUTION INTRAMUSCULAR; INTRAVENOUS at 03:55

## 2025-01-25 RX ADMIN — SODIUM CHLORIDE 500 ML: 9 INJECTION, SOLUTION INTRAVENOUS at 02:00

## 2025-01-25 RX ADMIN — IOPAMIDOL 85 ML: 612 INJECTION, SOLUTION INTRAVENOUS at 01:43

## 2025-01-25 NOTE — ED NOTES
Nursing report ED to floor  Emerita Matthews  69 y.o.  female    HPI :  HPI  Stated Reason for Visit: left side pain - states had hernia repaired 2 weeks ago, tonight she sneezed, and has increased pain on left side  History Obtained From: patient    Chief Complaint  Chief Complaint   Patient presents with    Back Pain       Admitting doctor:   No admitting provider for patient encounter.    Admitting diagnosis:   The primary encounter diagnosis was Abdominal pain, unspecified abdominal location. A diagnosis of Urinary tract infection without hematuria, site unspecified was also pertinent to this visit.    Code status:   Current Code Status       Date Active Code Status Order ID Comments User Context       Prior            Allergies:   Other    Isolation:   No active isolations    Intake and Output    Intake/Output Summary (Last 24 hours) at 1/25/2025 0704  Last data filed at 1/25/2025 0425  Gross per 24 hour   Intake 600 ml   Output --   Net 600 ml       Weight:       01/25/25  0016   Weight: 94.8 kg (209 lb)       Most recent vitals:   Vitals:    01/25/25 0019 01/25/25 0339 01/25/25 0341 01/25/25 0431   BP: 136/96 141/96 126/89 118/79   BP Location: Right arm Left arm     Patient Position: Sitting Lying     Pulse:  75 76 75   Resp:  18     Temp:       TempSrc:       SpO2:  97% 96% 98%   Weight:       Height:           Active LDAs/IV Access:   Lines, Drains & Airways       Active LDAs       None                    Labs (abnormal labs have a star):   Labs Reviewed   COMPREHENSIVE METABOLIC PANEL - Abnormal; Notable for the following components:       Result Value    Glucose 131 (*)     Potassium 3.4 (*)     BUN/Creatinine Ratio 25.3 (*)     All other components within normal limits    Narrative:     GFR Categories in Chronic Kidney Disease (CKD)      GFR Category          GFR (mL/min/1.73)    Interpretation  G1                     90 or greater         Normal or high (1)  G2                      60-89                Mild  decrease (1)  G3a                   45-59                Mild to moderate decrease  G3b                   30-44                Moderate to severe decrease  G4                    15-29                Severe decrease  G5                    14 or less           Kidney failure          (1)In the absence of evidence of kidney disease, neither GFR category G1 or G2 fulfill the criteria for CKD.    eGFR calculation 2021 CKD-EPI creatinine equation, which does not include race as a factor   URINALYSIS W/ MICROSCOPIC IF INDICATED (NO CULTURE) - Abnormal; Notable for the following components:    Leuk Esterase, UA Small (1+) (*)     Nitrite, UA Positive (*)     All other components within normal limits   URINALYSIS, MICROSCOPIC ONLY - Abnormal; Notable for the following components:    WBC, UA 11-20 (*)     Bacteria, UA 4+ (*)     All other components within normal limits   LIPASE - Normal   CBC WITH AUTO DIFFERENTIAL - Normal   URINE CULTURE   CBC AND DIFFERENTIAL    Narrative:     The following orders were created for panel order CBC & Differential.  Procedure                               Abnormality         Status                     ---------                               -----------         ------                     CBC Auto Differential[[id number]]        Normal              Final result                 Please view results for these tests on the individual orders.       EKG:   No orders to display       Meds given in ED:   Medications   sodium chloride 0.9 % bolus 500 mL (0 mL Intravenous Stopped 1/25/25 0315)   iopamidol (ISOVUE-300) 61 % injection 100 mL (85 mL Intravenous Given 1/25/25 0143)   cefTRIAXone (ROCEPHIN) 2,000 mg in sodium chloride 0.9 % 100 mL MBP (0 mg Intravenous Stopped 1/25/25 0425)       Imaging results:  CT Abdomen Pelvis With Contrast    Result Date: 1/25/2025  Electronically signed by Marcio Mayberry MD on 01-25-25 at 0333     Ambulatory status:   - Bed rest.    Social issues:   Social History      Socioeconomic History    Marital status:    Tobacco Use    Smoking status: Never     Passive exposure: Never    Smokeless tobacco: Never   Vaping Use    Vaping status: Never Used   Substance and Sexual Activity    Alcohol use: Not Currently     Comment: Seldom    Drug use: Never     Types: Marijuana     Comment: VERY SELDOM AT TIMES    Sexual activity: Not Currently     Partners: Male     Birth control/protection: Tubal ligation       Peripheral Neurovascular  Peripheral Neurovascular (Adult)  Peripheral Neurovascular WDL: WDL    Neuro Cognitive  Neuro Cognitive (Adult)  Cognitive/Neuro/Behavioral WDL: WDL    Learning  Learning Assessment  Learning Readiness and Ability: no barriers identified  Education Provided  Person Taught: patient    Respiratory  Respiratory  Airway WDL: WDL  Respiratory WDL  Respiratory WDL: WDL    Abdominal Pain       Pain Assessments  Pain (Adult)  (0-10) Pain Rating: Rest: 4  (0-10) Pain Rating: Activity: 9  Pain Location: back, flank  Pain Side/Orientation: lower    NIH Stroke Scale       Kevin Maguire RN Extern  01/25/25 07:04 EST

## 2025-01-25 NOTE — ED PROVIDER NOTES
EMERGENCY DEPARTMENT ENCOUNTER    Room Number:  11/11  PCP: Josselyn Alex MD  Patient Care Team:  Josselyn Alex MD as PCP - General (Internal Medicine)  Ja Munoz MD as Consulting Physician (Pulmonary Disease)   Independent Historians: Patient    HPI:  Chief Complaint: Abdominal pain    A complete HPI/ROS/PMH/PSH/SH/FH are unobtainable due to: None    Chronic or social conditions impacting patient care (Social Determinants of Health): None  (Financial Resource Strain / Food Insecurity / Transportation Needs / Physical Activity / Stress / Social Connections / Intimate Partner Violence / Housing Stability)    Context: Emerita Matthews is a 69 y.o. female who presents to the ED c/o acute left-sided abdominal pain radiates to her back.  Patient is described after she sneezed this evening.  Denies any pain in her chest there is no pleuritic component to her pain no nausea vomiting.  Patient is 2 weeks status post repair of umbilical hernia.  Patient does not have any pain over the hernia site no bulging.    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    Fatty liver     Headache, tension-type     Hyperlipidemia     Hypertension     Kidney stones 2019    Memory loss     Obesity 1975    Plantar fasciitis     Prediabetes     Ventral hernia          PAST SURGICAL HISTORY  Past Surgical History:   Procedure Laterality Date    BREAST BIOPSY  ?    CHOLECYSTECTOMY WITH INTRAOPERATIVE CHOLANGIOGRAM N/A 2023    Procedure: Laparoscopic cholecystectomy with cholangiogram;  Surgeon: Dorina Rehman MD;  Location: [facility] MAIN OR;  Service: General;  Laterality: N/A;    COLONOSCOPY N/A     NBIH otherwise normal-Dr. Hull    DENTAL PROCEDURE      implants AGE 60 LOST BABY TEETH    DILATATION AND CURETTAGE      EYE SURGERY      KIDNEY STONE SURGERY      TUBAL ABDOMINAL LIGATION      UMBILICAL HERNIA REPAIR N/A 2023    Procedure: Open periumbilical hernia repair with mesh;  Surgeon: Dorina Rehman MD;  Location: [facility] OR;  Service: General;  Laterality: N/A;    VENTRAL HERNIA REPAIR N/A 2025    Procedure: Laparoscopic robotic-assisted ventral/incisional hernia repair with mesh;  Surgeon: Dorina Rehman MD;  Location: [facility] OR;  Service: Robotics - DaVinci;  Laterality: N/A;    WISDOM TOOTH EXTRACTION           FAMILY HISTORY  Family History   Problem Relation Age of Onset    COPD Mother     Macular degeneration Mother     Arthritis Mother     Cancer Mother 86        bladder    Parkinsonism Brother     Gallbladder disease Daughter     Breast cancer Maternal Grandmother 50        Diagnosed around 50 and  at 54 per patient    Cancer Maternal Grandmother         breast    Arthritis Paternal Grandmother     Malig Hyperthermia Neg Hx          SOCIAL HISTORY  Social History     Socioeconomic History    Marital  status:    Tobacco Use    Smoking status: Never     Passive exposure: Never    Smokeless tobacco: Never   Vaping Use    Vaping status: Never Used   Substance and Sexual Activity    Alcohol use: Not Currently     Comment: Seldom    Drug use: Never     Types: Marijuana     Comment: VERY SELDOM AT TIMES    Sexual activity: Not Currently     Partners: Male     Birth control/protection: Tubal ligation         ALLERGIES  Other        REVIEW OF SYSTEMS  Review of Systems  Included in HPI  All systems reviewed and negative except for those discussed in HPI.      PHYSICAL EXAM    I have reviewed the triage vital signs and nursing notes.    ED Triage Vitals   Temp Heart Rate Resp BP SpO2   01/25/25 0016 01/25/25 0016 01/25/25 0016 01/25/25 0019 01/25/25 0016   97.5 °F (36.4 °C) 87 18 136/96 97 %      Temp src Heart Rate Source Patient Position BP Location FiO2 (%)   01/25/25 0016 01/25/25 0016 01/25/25 0019 01/25/25 0019 --   Tympanic Monitor Sitting Right arm        Physical Exam  GENERAL: alert, no acute distress  SKIN: Warm, dry  HENT: Normocephalic, atraumatic  EYES: no scleral icterus  CV: regular rhythm, regular rate  RESPIRATORY: normal effort, lungs clear  ABDOMEN: soft, palpation of anterior left abdomen there is no rebound or guarding, nondistended  MUSCULOSKELETAL: no deformity  NEURO: alert, moves all extremities, follows commands                                                               LAB RESULTS  Recent Results (from the past 24 hours)   Comprehensive Metabolic Panel    Collection Time: 01/25/25 12:37 AM    Specimen: Blood   Result Value Ref Range    Glucose 131 (H) 65 - 99 mg/dL    BUN 22 8 - 23 mg/dL    Creatinine 0.87 0.57 - 1.00 mg/dL    Sodium 139 136 - 145 mmol/L    Potassium 3.4 (L) 3.5 - 5.2 mmol/L    Chloride 101 98 - 107 mmol/L    CO2 27.0 22.0 - 29.0 mmol/L    Calcium 9.2 8.6 - 10.5 mg/dL    Total Protein 7.0 6.0 - 8.5 g/dL    Albumin 4.1 3.5 - 5.2 g/dL    ALT (SGPT) 16 1 - 33 U/L    AST  (SGOT) 16 1 - 32 U/L    Alkaline Phosphatase 67 39 - 117 U/L    Total Bilirubin 0.5 0.0 - 1.2 mg/dL    Globulin 2.9 gm/dL    A/G Ratio 1.4 g/dL    BUN/Creatinine Ratio 25.3 (H) 7.0 - 25.0    Anion Gap 11.0 5.0 - 15.0 mmol/L    eGFR 72.2 >60.0 mL/min/1.73   Lipase    Collection Time: 01/25/25 12:37 AM    Specimen: Blood   Result Value Ref Range    Lipase 31 13 - 60 U/L   CBC Auto Differential    Collection Time: 01/25/25 12:37 AM    Specimen: Blood   Result Value Ref Range    WBC 7.49 3.40 - 10.80 10*3/mm3    RBC 5.01 3.77 - 5.28 10*6/mm3    Hemoglobin 14.8 12.0 - 15.9 g/dL    Hematocrit 43.9 34.0 - 46.6 %    MCV 87.6 79.0 - 97.0 fL    MCH 29.5 26.6 - 33.0 pg    MCHC 33.7 31.5 - 35.7 g/dL    RDW 12.8 12.3 - 15.4 %    RDW-SD 41.1 37.0 - 54.0 fl    MPV 9.3 6.0 - 12.0 fL    Platelets 265 140 - 450 10*3/mm3    Neutrophil % 57.2 42.7 - 76.0 %    Lymphocyte % 30.6 19.6 - 45.3 %    Monocyte % 8.1 5.0 - 12.0 %    Eosinophil % 3.5 0.3 - 6.2 %    Basophil % 0.3 0.0 - 1.5 %    Immature Grans % 0.3 0.0 - 0.5 %    Neutrophils, Absolute 4.29 1.70 - 7.00 10*3/mm3    Lymphocytes, Absolute 2.29 0.70 - 3.10 10*3/mm3    Monocytes, Absolute 0.61 0.10 - 0.90 10*3/mm3    Eosinophils, Absolute 0.26 0.00 - 0.40 10*3/mm3    Basophils, Absolute 0.02 0.00 - 0.20 10*3/mm3    Immature Grans, Absolute 0.02 0.00 - 0.05 10*3/mm3    nRBC 0.0 0.0 - 0.2 /100 WBC   Urinalysis With Microscopic If Indicated (No Culture) - Urine, Clean Catch    Collection Time: 01/25/25  2:02 AM    Specimen: Urine, Clean Catch   Result Value Ref Range    Color, UA Yellow Yellow, Straw    Appearance, UA Clear Clear    pH, UA 5.5 5.0 - 8.0    Specific Gravity, UA 1.023 1.005 - 1.030    Glucose, UA Negative Negative    Ketones, UA Negative Negative    Bilirubin, UA Negative Negative    Blood, UA Negative Negative    Protein, UA Negative Negative    Leuk Esterase, UA Small (1+) (A) Negative    Nitrite, UA Positive (A) Negative    Urobilinogen, UA 0.2 E.U./dL 0.2 - 1.0 E.U./dL    Urinalysis, Microscopic Only - Urine, Clean Catch    Collection Time: 01/25/25  2:02 AM    Specimen: Urine, Clean Catch   Result Value Ref Range    RBC, UA 0-2 None Seen, 0-2 /HPF    WBC, UA 11-20 (A) None Seen, 0-2 /HPF    Bacteria, UA 4+ (A) None Seen /HPF    Squamous Epithelial Cells, UA 0-2 None Seen, 0-2 /HPF    Hyaline Casts, UA None Seen None Seen /LPF    Methodology Automated Microscopy        I ordered the above labs and independently reviewed the results.        RADIOLOGY  CT Abdomen Pelvis With Contrast    Result Date: 1/25/2025  Patient: GREG SCHWARZ  Time Out: 03:33 Exam(s): CT ABDOMEN + PELVIS With Contrast EXAM:   CT Abdomen and Pelvis With Intravenous Contrast CLINICAL HISTORY:    Reason for exam: left sided abd pain. TECHNIQUE:   Axial computed tomography images of the abdomen and pelvis with intravenous contrast.  CTDI is 21.65 mGy and DLP is 1043.6 mGy-cm.  This CT exam was performed according to the principle of ALARA (As Low As Reasonably Achievable) by using one or more of the following dose reduction techniques: automated exposure control, adjustment of the mA and or kV according to patient size, and or use of iterative reconstruction technique. COMPARISON:   CT Abdomen Pelvis dated september 10 2024 FINDINGS:   Lung bases:  Unremarkable.  No mass.  No consolidation.  ABDOMEN:   Liver:  Unremarkable.  No mass.   Gallbladder and bile ducts:  Cholecystectomy changes.  No ductal dilation.   Pancreas:  Unremarkable.  No mass.  No ductal dilation.   Spleen:  Unremarkable.  No splenomegaly.   Adrenals:  Unremarkable.  No mass.   Kidneys and ureters:  2 mm calculus within the superior pole of the right kidney.  No hydronephrosis.   Stomach and bowel:  No evidence of bowel obstruction.  No mucosal thickening.  PELVIS:   Appendix:  The appendix is not definitively visualized.  No gross findings to suggest appendicitis.   Bladder:  Unremarkable.  No mass.   Reproductive:  Unremarkable as visualized.   ABDOMEN and PELVIS:   Intraperitoneal space:  Unremarkable.  No free air.  No significant fluid collection.   Bones joints:  Degenerative changes in the spine.  No acute fracture.  No dislocation.   Soft tissues:  5.0 x 6.7 x 6.4 cm fluid collection noted at the right aspect of the umbilicus with interval ventral hernia repair.  This may be sterile or infected.  Consider correlation with operative history.   Vasculature:  Splenic artery aneurysm measuring 1.1 cm with peripheral calcifications.  The absence of risk factors, this is not necessarily require further follow-up.   Lymph nodes:  Unremarkable.  No enlarged lymph nodes.   Other findings:  Unclear history. IMPRESSION:     1.  5.0 x 6.7 x 6.4 cm fluid collection noted at the right aspect of the umbilicus with interval ventral hernia repair.  This may be sterile or infected.  Consider correlation with operative history. 2.  No evidence of bowel obstruction. 3.  Splenic artery aneurysm measuring 1.1 cm with peripheral calcifications.  The absence of risk factors, this is not necessarily require further follow-up. 4.  No other acute findings. 5.  Incidental findings as described.     Electronically signed by Marcio Mayberry MD on 01-25-25 at 0333     I ordered the above noted radiological studies. Reviewed by me and discussed with radiologist.  See dictation for official radiology interpretation.      PROCEDURES    Procedures      MEDICATIONS GIVEN IN ER    Medications   sodium chloride 0.9 % flush 10 mL (has no administration in time range)   morphine injection 2 mg (0 mg Intravenous Hold 1/25/25 0045)   ondansetron (ZOFRAN) injection 4 mg (4 mg Intravenous Not Given 1/25/25 0046)   sodium chloride 0.9 % bolus 500 mL (0 mL Intravenous Stopped 1/25/25 0315)   iopamidol (ISOVUE-300) 61 % injection 100 mL (85 mL Intravenous Given 1/25/25 0143)   cefTRIAXone (ROCEPHIN) 2,000 mg in sodium chloride 0.9 % 100 mL MBP (2,000 mg Intravenous New Bag 1/25/25 0355)          ORDERS PLACED DURING THIS VISIT:  Orders Placed This Encounter   Procedures    Urine Culture - Urine, Urine, Clean Catch    CT Abdomen Pelvis With Contrast    Comprehensive Metabolic Panel    Lipase    Urinalysis With Microscopic If Indicated (No Culture) - Urine, Clean Catch    CBC Auto Differential    Urinalysis, Microscopic Only - Urine, Clean Catch    Monitor Blood Pressure    Continuous Pulse Oximetry    Insert Peripheral IV    CBC & Differential         PROGRESS, DATA ANALYSIS, CONSULTS, AND MEDICAL DECISION MAKING    All labs have been independently interpreted by me.  All radiology studies have been reviewed by me and discussed with radiologist dictating the report.   EKG's independently viewed and interpreted by me.  Discussion below represents my analysis of pertinent findings related to patient's condition, differential diagnosis, treatment plan and final disposition.    Differential diagnosis includes but is not limited to:  Gastritis, gastroenteritis, peptic ulcer disease, GERD, esophageal perforation, acute appendicitis, mesenteric adenitis, Meckel’s diverticulum, epiploic appendagitis, diverticulitis, colon cancer, ulcerative colitis, Crohn’s disease, intussusception, small bowel obstruction, adhesions, ischemic bowel, perforated viscus, ileus, obstipation, biliary colic, cholecystitis, cholelithiasis, Brayan-Manuel Jose, hepatitis, pancreatitis, common bile duct obstruction, cholangitis, bile leak, splenic trauma, splenic rupture, splenic infarction, splenic abscess, abdominal abscess, ascites, spontaneous bacterial peritonitis, hernia, UTI, cystitis, prostatitis, ureterolithiasis, urinary obstruction, ovarian cyst, torsion, pregnancy, ectopic pregnancy, PID, pelvic abscess, mittelschmerz, endometriosis, AAA, myocardial infarction, pneumonia, cancer, porphyria, DKA, medications, sickle cell, viral syndrome, zoster  .    Clinical Scores:              ED Course as of 01/25/25 0443   Sat Jan 25,  2025 0057 WBC: 7.49 [TJ]   0058 Hemoglobin: 14.8 [TJ]   0058 Platelets: 265 [TJ]   0218 WBC, UA(!): 11-20 [TJ]   0219 Bacteria, UA(!): 4+ [TJ]      ED Course User Index  [TJ] Julio Navarro MD               PPE: The patient wore a mask and I wore an N95 mask throughout the entire patient encounter.       AS OF 04:43 EST VITALS:    BP - 126/89  HR - 76  TEMP - 97.5 °F (36.4 °C) (Tympanic)  O2 SATS - 96%        DIAGNOSIS  Final diagnoses:   Abdominal pain, unspecified abdominal location   Urinary tract infection without hematuria, site unspecified         DISPOSITION  ED Disposition       ED Disposition   Discharge    Condition   Stable    Comment   --                  Note Disclaimer: At [facility], we believe that sharing information builds trust and better relationships. You are receiving this note because you recently visited [facility]. It is possible you will see health information before a provider has talked with you about it. This kind of information can be easy to misunderstand. To help you fully understand what it means for your health, we urge you to discuss this note with your provider.         Julio Navarro MD  01/25/25 [id number]

## 2025-01-27 LAB — BACTERIA SPEC AEROBE CULT: ABNORMAL

## 2025-01-27 RX ORDER — METFORMIN HYDROCHLORIDE 500 MG/1
TABLET, EXTENDED RELEASE ORAL
Qty: 90 TABLET | Refills: 3 | Status: SHIPPED | OUTPATIENT
Start: 2025-01-27

## 2025-01-27 NOTE — TELEPHONE ENCOUNTER
Rx Refill Note  Requested Prescriptions     Pending Prescriptions Disp Refills    metFORMIN ER (GLUCOPHAGE-XR) 500 MG 24 hr tablet [Pharmacy Med Name: metFORMIN HCl ER Oral Tablet Extended Release 24 Hour 500 MG] 90 tablet 3     Sig: TAKE 1 TABLET EVERY DAY WITH BREAKFAST FOR PREDIABETES      Last office visit with prescribing clinician: 7/30/2024   Last telemedicine visit with prescribing clinician: Visit date not found   Next office visit with prescribing clinician: 1/31/2025                         Would you like a call back once the refill request has been completed: [] Yes [] No    If the office needs to give you a call back, can they leave a voicemail: [] Yes [] No    Gerson Macedo MA  01/27/25, 10:39 EST

## 2025-01-31 ENCOUNTER — TRANSCRIBE ORDERS (OUTPATIENT)
Dept: ADMINISTRATIVE | Facility: HOSPITAL | Age: 70
End: 2025-01-31
Payer: MEDICARE

## 2025-01-31 ENCOUNTER — OFFICE VISIT (OUTPATIENT)
Dept: FAMILY MEDICINE CLINIC | Facility: CLINIC | Age: 70
End: 2025-01-31
Payer: MEDICARE

## 2025-01-31 VITALS
WEIGHT: 212.1 LBS | BODY MASS INDEX: 35.34 KG/M2 | DIASTOLIC BLOOD PRESSURE: 74 MMHG | SYSTOLIC BLOOD PRESSURE: 126 MMHG | HEIGHT: 65 IN | OXYGEN SATURATION: 93 % | HEART RATE: 93 BPM

## 2025-01-31 DIAGNOSIS — I72.8 SPLENIC ARTERY ANEURYSM: Primary | ICD-10-CM

## 2025-01-31 DIAGNOSIS — Z87.440 HISTORY OF UTI: ICD-10-CM

## 2025-01-31 DIAGNOSIS — S30.1XXD ABDOMINAL WALL SEROMA, SUBSEQUENT ENCOUNTER: ICD-10-CM

## 2025-01-31 DIAGNOSIS — Z12.31 SCREENING MAMMOGRAM, ENCOUNTER FOR: Primary | ICD-10-CM

## 2025-01-31 PROCEDURE — 1160F RVW MEDS BY RX/DR IN RCRD: CPT | Performed by: INTERNAL MEDICINE

## 2025-01-31 PROCEDURE — 1126F AMNT PAIN NOTED NONE PRSNT: CPT | Performed by: INTERNAL MEDICINE

## 2025-01-31 PROCEDURE — 99214 OFFICE O/P EST MOD 30 MIN: CPT | Performed by: INTERNAL MEDICINE

## 2025-01-31 PROCEDURE — 1159F MED LIST DOCD IN RCRD: CPT | Performed by: INTERNAL MEDICINE

## 2025-01-31 PROCEDURE — G2211 COMPLEX E/M VISIT ADD ON: HCPCS | Performed by: INTERNAL MEDICINE

## 2025-01-31 NOTE — PROGRESS NOTES
"Chief Complaint  Follow-up (6 mo)    Subjective        Emerita Matthews presents to [facility] PRIMARY CARE    Routine  Symptoms are: new.   Onset was in the past 7 days.   Symptoms occur: daily.  Symptoms include: abdominal pain.   Pertinent negative symptoms include no anorexia, no joint pain, no change in stool, no chest pain, no chills, no congestion, no cough, no diaphoresis, no fatigue, no fever, no headaches, no joint swelling, no myalgias, no nausea, no neck pain, no numbness, no rash, no sore throat, no swollen glands, no dysuria, no vertigo, no visual change, no vomiting and no weakness.   Treatment and/or Medications comments include: Just because of hernia surgery     Had umbilical hernia repair 1/8/25 with Dr. Dorina Rehman and was recovering well until she sneezed on  1/25/25 and had acute pain in left side of abdomen.  She drove herself to ED.  Pain was worse with movement like getting in and out of car or getting onto the CT table.  Pain significantly improved within 24 hours and no further abdominal pain or sx's.  Pt had already had her 2 week post op check and there were no issues other than a small fluid collection around the umbilicus that per Dr. Rehman's note was c/w hematoma versus seroma   there are no fevers   no tenderness to that area.   .  CT scan at ED was reassuring other than the finding of an incidental splenic artery aneurysm and the fluid collection to right of umbilical area.  She sees Dr. Rehman 3/4/25 for recheck.  No abdominal sx.  No n/v/d and appetite is normal and having normal BM's.  Urine showed UTI and she finished antibiotic last night.  No dysuria and no UTI sx now or then actually. No fever.    Objective   Vital Signs:  /74 (BP Location: Left arm, Patient Position: Sitting, Cuff Size: Adult)   Pulse 93   Ht 165.1 cm (65\")   Wt 96.2 kg (212 lb 1.6 oz)   SpO2 93%   BMI 35.30 kg/m²   Estimated body mass index is 35.3 kg/m² as calculated from " "the following:    Height as of this encounter: 165.1 cm (65\").    Weight as of this encounter: 96.2 kg (212 lb 1.6 oz).            Physical Exam  Vitals and nursing note reviewed.   Constitutional:       Appearance: Normal appearance.   HENT:      Head: Normocephalic and atraumatic.   Cardiovascular:      Rate and Rhythm: Normal rate and regular rhythm.      Heart sounds: Normal heart sounds.   Pulmonary:      Effort: Pulmonary effort is normal.      Breath sounds: Normal breath sounds.   Abdominal:      General: Abdomen is flat. Bowel sounds are normal. There is no distension.      Palpations: Abdomen is soft. There is no mass.      Tenderness: There is abdominal tenderness. There is no guarding or rebound.      Hernia: No hernia is present.      Comments: Small area of firmness to right of umbilicus.  Non tender. No redness or drainage.  Non tender exam of abdomen   Musculoskeletal:      Right lower leg: No edema.      Left lower leg: No edema.   Neurological:      General: No focal deficit present.      Mental Status: She is alert and oriented to person, place, and time.   Psychiatric:         Mood and Affect: Mood normal.         Behavior: Behavior normal.         Thought Content: Thought content normal.         Judgment: Judgment normal.        Result Review :                Assessment and Plan   Diagnoses and all orders for this visit:    1. Splenic artery aneurysm (Primary)  -     Ambulatory Referral to Vascular Surgery    2. History of UTI  -     Urinalysis With Culture If Indicated -    3. Abdominal wall seroma, subsequent encounter      Patient has recovered fully from her acute abdominal pain following sneezing last week.  She has no subsequent tenderness in her abdomen.  She has no fever.  No nausea vomiting diarrhea.  She completed her UTI course of antibiotics and we will check a UA CNS today to document resolution.  Incidental splenic artery aneurysm of 1 cm found on CT scan.  Refer to vascular " surgery for surveillance.  She has follow-up with her surgeon in March due to area near the umbilicus consistent with seroma.  This finding was also seen on CT.  She has no tenderness.  No redness.  No fever.  She will reach out to Dr. Rehman if any of those symptoms develop or the area around her umbilicus enlarges or becomes more firm or painful.       Follow Up   No follow-ups on file.  Patient was given instructions and counseling regarding her condition or for health maintenance advice. Please see specific information pulled into the AVS if appropriate.

## 2025-02-01 LAB
APPEARANCE UR: CLEAR
BACTERIA #/AREA URNS HPF: NORMAL /[HPF]
BILIRUB UR QL STRIP: NEGATIVE
CASTS URNS QL MICRO: NORMAL /LPF
COLOR UR: YELLOW
EPI CELLS #/AREA URNS HPF: NORMAL /HPF (ref 0–10)
GLUCOSE UR QL STRIP: NEGATIVE
HGB UR QL STRIP: NEGATIVE
KETONES UR QL STRIP: NEGATIVE
LEUKOCYTE ESTERASE UR QL STRIP: NEGATIVE
MICRO URNS: NORMAL
MICRO URNS: NORMAL
NITRITE UR QL STRIP: NEGATIVE
PH UR STRIP: 6 [PH] (ref 5–7.5)
PROT UR QL STRIP: NEGATIVE
RBC #/AREA URNS HPF: NORMAL /HPF (ref 0–2)
SP GR UR STRIP: 1.02 (ref 1–1.03)
URINALYSIS REFLEX: NORMAL
UROBILINOGEN UR STRIP-MCNC: 0.2 MG/DL (ref 0.2–1)
WBC #/AREA URNS HPF: NORMAL /HPF (ref 0–5)

## 2025-02-03 ENCOUNTER — TELEPHONE (OUTPATIENT)
Dept: GASTROENTEROLOGY | Facility: CLINIC | Age: 70
End: 2025-02-03

## 2025-02-04 ENCOUNTER — TELEPHONE (OUTPATIENT)
Dept: GASTROENTEROLOGY | Facility: CLINIC | Age: 70
End: 2025-02-04
Payer: MEDICARE

## 2025-02-09 NOTE — PROGRESS NOTES
"Chief Complaint  Splenic artery aneurysm     Subjective          HPI: Emerita Matthews presents to [facility] VASCULAR SURGERY who was referred as a new patient evaluation regarding a splenic artery aneurysm.  She recently had a robotic recurrent incisional hernia repair with mesh by Dr. Rehman.  History of Present Illness    Patient denies knowledge of a personal or family history of aneurysmal disease.  Despite being seen on previous studies this was not documented or revealed to her.  Review of Systems     History Review Reviewed Comments   Past Medical History:  [x]  Hypertension, prediabetes, ventral hernia   Past Surgical History: [x]  Umbilical hernia repair, ventral hernia repair, lap julio   Family History: [x]  No aneurysmal disease   Social History: [x]  Non-smoker     Objective   Vital Signs:  /85   Pulse 84   Ht 165.1 cm (65\")   Wt 96.2 kg (212 lb 1.6 oz)   BMI 35.30 kg/m²   Estimated body mass index is 35.3 kg/m² as calculated from the following:    Height as of this encounter: 165.1 cm (65\").    Weight as of this encounter: 96.2 kg (212 lb 1.6 oz).              Physical Exam  Physical Exam    Carotids are without bruit.  Radial pulses symmetric.  Pedal pulses palpable.  Abdominal aorta nonpalpable secondary to body habitus.      Result Review :    Common labs          9/10/2024    14:34 1/2/2025    12:17 1/25/2025    00:37   Common Labs   Glucose  104  131    BUN  19  22    Creatinine 1.00  0.91  0.87    Sodium  137  139    Potassium  4.7  3.4    Chloride  100  101    Calcium  9.8  9.2    Albumin   4.1    Total Bilirubin   0.5    Alkaline Phosphatase   67    AST (SGOT)   16    ALT (SGPT)   16    WBC  7.16  7.49    Hemoglobin  15.5  14.8    Hematocrit  45.2  43.9    Platelets  230  265      Results       Most notable findings include: Creatinine 0.87.  Potassium 3.4.  Hemoglobin 14.8.  Platelet count 265.    Imaging studies reviewed:  CT Abdomen Pelvis With Contrast (01/25/2025 " 01:45) most recent images reviewed.  1.1 cm partially calcified splenic artery aneurysm.  I also went back and reviewed images back to 2023 and it appears to be unchanged.        Emerita Matthews  reports that she has never smoked. She has never been exposed to tobacco smoke. She has never used smokeless tobacco..        Patient or patient representative verbalized consent for the use of Ambient Listening during the visit with  Domingo Kim MD for chart documentation. 2/10/2025  10:28 EST        Assessment and Plan     Assessment & Plan  Visceral aneurysm    Orders:    CT Abdomen Without Contrast; Standing       Assessment & Plan      Splenic artery aneurysm: Patient has a small 1.1 cm splenic artery aneurysm that is partially calcified.  It is also stable and I went back and saw it on images back in 2023 which was the oldest one that I had access to do.  I explained to the patient that this is a small aneurysm and we would not typically consider intervention until it was significantly larger.  She is no longer of childbearing age.  Will plan to follow-up with a noncontrasted CT scan in 2 years so that this can be followed longitudinally.  Risk of progression is present but risk of rupture is quite small at this size.       Follow Up     Return in about 2 years (around 2/10/2027).  Patient was given instructions and counseling regarding her condition or for health maintenance advice. Please see specific information pulled into the AVS if appropriate.

## 2025-02-10 ENCOUNTER — OFFICE VISIT (OUTPATIENT)
Age: 70
End: 2025-02-10
Payer: MEDICARE

## 2025-02-10 VITALS
HEIGHT: 65 IN | DIASTOLIC BLOOD PRESSURE: 85 MMHG | WEIGHT: 212.1 LBS | SYSTOLIC BLOOD PRESSURE: 125 MMHG | BODY MASS INDEX: 35.34 KG/M2 | HEART RATE: 84 BPM

## 2025-02-10 DIAGNOSIS — I72.8: Primary | ICD-10-CM

## 2025-02-10 PROCEDURE — 1160F RVW MEDS BY RX/DR IN RCRD: CPT | Performed by: SURGERY

## 2025-02-10 PROCEDURE — 1159F MED LIST DOCD IN RCRD: CPT | Performed by: SURGERY

## 2025-02-10 PROCEDURE — G2211 COMPLEX E/M VISIT ADD ON: HCPCS | Performed by: SURGERY

## 2025-02-10 PROCEDURE — 99204 OFFICE O/P NEW MOD 45 MIN: CPT | Performed by: SURGERY

## 2025-02-10 RX ORDER — ASPIRIN 81 MG/1
81 TABLET ORAL DAILY
Qty: 90 TABLET | Refills: 11 | Status: SHIPPED | OUTPATIENT
Start: 2025-02-10

## 2025-02-24 RX ORDER — HYDROCHLOROTHIAZIDE 12.5 MG/1
12.5 TABLET ORAL DAILY
Qty: 90 TABLET | Refills: 3 | Status: SHIPPED | OUTPATIENT
Start: 2025-02-24

## 2025-02-24 NOTE — TELEPHONE ENCOUNTER
Rx Refill Note  Requested Prescriptions     Pending Prescriptions Disp Refills    hydroCHLOROthiazide 12.5 MG tablet [Pharmacy Med Name: hydroCHLOROthiazide Oral Tablet 12.5 MG] 90 tablet 3     Sig: TAKE 1 TABLET EVERY DAY      Last office visit with prescribing clinician: 1/31/2025   Last telemedicine visit with prescribing clinician: Visit date not found   Next office visit with prescribing clinician: 8/8/2025                         Would you like a call back once the refill request has been completed: [] Yes [] No    If the office needs to give you a call back, can they leave a voicemail: [] Yes [] No    Gerson Macedo MA  02/24/25, 08:31 EST

## 2025-03-04 ENCOUNTER — OFFICE VISIT (OUTPATIENT)
Dept: SURGERY | Facility: CLINIC | Age: 70
End: 2025-03-04
Payer: MEDICARE

## 2025-03-04 VITALS
HEIGHT: 65 IN | DIASTOLIC BLOOD PRESSURE: 80 MMHG | WEIGHT: 223 LBS | OXYGEN SATURATION: 97 % | BODY MASS INDEX: 37.15 KG/M2 | HEART RATE: 88 BPM | SYSTOLIC BLOOD PRESSURE: 110 MMHG

## 2025-03-04 DIAGNOSIS — Z09 SURGICAL FOLLOWUP: Primary | ICD-10-CM

## 2025-03-04 PROCEDURE — 1160F RVW MEDS BY RX/DR IN RCRD: CPT | Performed by: SURGERY

## 2025-03-04 PROCEDURE — 99212 OFFICE O/P EST SF 10 MIN: CPT | Performed by: SURGERY

## 2025-03-04 PROCEDURE — 1159F MED LIST DOCD IN RCRD: CPT | Performed by: SURGERY

## 2025-03-04 RX ORDER — UBIDECARENONE 100 MG
100 CAPSULE ORAL DAILY
COMMUNITY

## 2025-03-04 NOTE — PROGRESS NOTES
CHIEF COMPLAINT:   Chief Complaint   Patient presents with    Follow-up     Laparoscopic robotic-assisted ventral/incisional hernia repair with mesh 1/8/25       HISTORY OF PRESENT ILLNESS:  This is a 69 y.o. female who presents for a post-operative visit after undergoing laparoscopic robotic assisted repair of a recurrent periumbilical hernia with mesh on 1/8/2025.  She has had resolution of the right upper quadrant and left lower abdominal pain.  She only sporadically will have twinges of discomfort just to the right of her umbilicus at the site of her prior hernia, but her seroma has resolved.    Pathology:   Hernia sac, herniorrhaphy:               -Benign fibrous serous membrane with adherent adipose, consistent with hernia sac.    PHYSICAL EXAM:  Lungs: Clear  Heart: RRR  ABD: Incisions are healing well without any erythema or signs of infection, periumbilical seroma has resolved, no overlying skin erythema or fluctuance, there is no appreciable recurrent periumbilical hernia when she stands upright and performs Valsalva  Ext: no significant edema, calves nontender    A/P:  This is a 69 y.o. female patient who is S/P laparoscopic robotic assisted repair of a recurrent periumbilical hernia with mesh on 1/8/2025    She can return to all activities as tolerated beginning tomorrow now that she is about 8 weeks post-op. She can see me back as needed.    Dorina Rehman MD  General, Robotic, and Endoscopic Surgery  [facility] [place], operated by [facility]    [address], [address]  [place] [address]  P: [phone]  F: [phone]

## 2025-03-21 ENCOUNTER — HOSPITAL ENCOUNTER (OUTPATIENT)
Dept: MAMMOGRAPHY | Facility: HOSPITAL | Age: 70
Discharge: HOME | End: 2025-03-21
Admitting: INTERNAL MEDICINE
Payer: MEDICARE

## 2025-03-21 DIAGNOSIS — Z12.31 SCREENING MAMMOGRAM, ENCOUNTER FOR: ICD-10-CM

## 2025-03-21 DIAGNOSIS — R92.8 ABNORMAL MAMMOGRAM: Primary | ICD-10-CM

## 2025-03-21 PROCEDURE — 77063 BREAST TOMOSYNTHESIS BI: CPT

## 2025-03-21 PROCEDURE — 77067 SCR MAMMO BI INCL CAD: CPT

## 2025-03-24 ENCOUNTER — TELEPHONE (OUTPATIENT)
Dept: FAMILY MEDICINE CLINIC | Facility: CLINIC | Age: 70
End: 2025-03-24
Payer: MEDICARE

## 2025-03-24 NOTE — TELEPHONE ENCOUNTER
Caller: Emerita Matthews    Relationship: Self    Best call back number: [phone]         Who are you requesting to speak with (clinical staff, provider,  specific staff member): PCP OR CLINICAL        What was the call regarding: PATIENT FELL ABOUT A WEEK AGO AND HAS PAIN IN HER RIB HER AREA THAT HASN'T GONE AWAY.  SHE IS SUPPOSED TO HAVE A BREAST US AND DIAGNOSTIC MAMMOGRAM AND WANTS TO KNOW IF SHE CAN GET AN XRAY OF HER RIB AND CHEST AREA AT THE SAME TIME.  PLEASE CALL TO ADVISE.

## 2025-04-17 ENCOUNTER — HOSPITAL ENCOUNTER (OUTPATIENT)
Dept: ULTRASOUND IMAGING | Facility: HOSPITAL | Age: 70
Discharge: HOME | End: 2025-04-17
Payer: MEDICARE

## 2025-04-17 ENCOUNTER — TELEPHONE (OUTPATIENT)
Dept: MAMMOGRAPHY | Facility: HOSPITAL | Age: 70
End: 2025-04-17
Payer: MEDICARE

## 2025-04-17 ENCOUNTER — HOSPITAL ENCOUNTER (OUTPATIENT)
Dept: MAMMOGRAPHY | Facility: HOSPITAL | Age: 70
Discharge: HOME | End: 2025-04-17
Payer: MEDICARE

## 2025-04-17 DIAGNOSIS — R92.8 ABNORMAL MAMMOGRAM: ICD-10-CM

## 2025-04-17 PROCEDURE — G0279 TOMOSYNTHESIS, MAMMO: HCPCS

## 2025-04-17 PROCEDURE — 76642 ULTRASOUND BREAST LIMITED: CPT

## 2025-04-17 PROCEDURE — 77065 DX MAMMO INCL CAD UNI: CPT

## 2025-04-18 ENCOUNTER — APPOINTMENT (OUTPATIENT)
Dept: CT IMAGING | Facility: HOSPITAL | Age: 70
End: 2025-04-18
Payer: MEDICARE

## 2025-04-18 ENCOUNTER — HOSPITAL ENCOUNTER (EMERGENCY)
Facility: HOSPITAL | Age: 70
Discharge: HOME | End: 2025-04-18
Attending: EMERGENCY MEDICINE
Payer: MEDICARE

## 2025-04-18 VITALS
TEMPERATURE: 97.7 F | HEART RATE: 87 BPM | BODY MASS INDEX: 36.65 KG/M2 | HEIGHT: 65 IN | RESPIRATION RATE: 15 BRPM | DIASTOLIC BLOOD PRESSURE: 85 MMHG | WEIGHT: 220 LBS | OXYGEN SATURATION: 95 % | SYSTOLIC BLOOD PRESSURE: 128 MMHG

## 2025-04-18 DIAGNOSIS — M25.551 RIGHT HIP PAIN: Primary | ICD-10-CM

## 2025-04-18 DIAGNOSIS — M16.0 PRIMARY OSTEOARTHRITIS OF BOTH HIPS: ICD-10-CM

## 2025-04-18 PROCEDURE — 72192 CT PELVIS W/O DYE: CPT

## 2025-04-18 PROCEDURE — 99284 EMERGENCY DEPT VISIT MOD MDM: CPT

## 2025-04-18 NOTE — ED PROVIDER NOTES
EMERGENCY DEPARTMENT ENCOUNTER    Room Number:  22/22  PCP: Josselyn Alex MD    HPI:  Chief Complaint: Right hip pain  A complete HPI/ROS/PMH/PSH/SH/FH are unobtainable due to: None  Context: Emerita Matthews is a 69 y.o. female who presents to the ED c/o acute right hip pain.  Onset of symptoms yesterday morning.  It is a stabbing pain that is worse with movement and standing.  She can bear weight, however.  No preceding trauma or fever.  She had x-rays performed earlier today and her PCP told her to remain nonweightbearing and to come to the ER today for evaluation with a CT scan.        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    Fatty liver     Headache, tension-type     Hyperlipidemia     Hypertension     Kidney stones 08/01/2019    Memory loss     Obesity 1975    Plantar fasciitis     Prediabetes     Ventral hernia          PAST SURGICAL HISTORY  Past Surgical History:   Procedure Laterality Date    BREAST BIOPSY  ?    CHOLECYSTECTOMY WITH INTRAOPERATIVE CHOLANGIOGRAM N/A 07/28/2023    Procedure: Laparoscopic cholecystectomy with cholangiogram;  Surgeon:  Dorina Rehman MD;  Location:  [facility] MAIN OR;  Service: General;  Laterality: N/A;    COLONOSCOPY N/A     NBIH otherwise normal-Dr. Hull    DENTAL PROCEDURE      implants AGE 60 LOST BABY TEETH    DILATATION AND CURETTAGE      EYE SURGERY      KIDNEY STONE SURGERY      TUBAL ABDOMINAL LIGATION      UMBILICAL HERNIA REPAIR N/A 2023    Procedure: Open periumbilical hernia repair with mesh;  Surgeon: Dorina Rehman MD;  Location: [facility] MAIN OR;  Service: General;  Laterality: N/A;    VENTRAL HERNIA REPAIR N/A 2025    Procedure: Laparoscopic robotic-assisted ventral/incisional hernia repair with mesh;  Surgeon: Dorina Rehman MD;  Location: [facility] MAIN OR;  Service: Robotics - DaVinci;  Laterality: N/A;    WISDOM TOOTH EXTRACTION           FAMILY HISTORY  Family History   Problem Relation Age of Onset    COPD Mother     Macular degeneration Mother     Arthritis Mother     Cancer Mother 86        bladder    Hearing loss Mother     Parkinsonism Brother     Gallbladder disease Daughter     Breast cancer Maternal Grandmother 50        Diagnosed around 50 and  at 54 per patient    Cancer Maternal Grandmother         breast    Arthritis Paternal Grandmother     Alcohol abuse Maternal Grandfather     Early death Paternal Grandfather     Arthritis Sister     Malig Hyperthermia Neg Hx          SOCIAL HISTORY  Social History     Socioeconomic History    Marital status:    Tobacco Use    Smoking status: Never     Passive exposure: Never    Smokeless tobacco: Never   Vaping Use    Vaping status: Never Used   Substance and Sexual Activity    Alcohol use: Yes     Comment: Seldom  not even once a month    Drug use: Not Currently     Types: Marijuana     Comment: VERY SELDOM AT TIMES    Sexual activity: Not Currently     Partners: Male     Birth control/protection: Abstinence, Tubal ligation     Comment: Denzle is not able         ALLERGIES  Other        REVIEW OF SYSTEMS  Review  of Systems     Included in HPI  All systems reviewed and negative except for those discussed in HPI.       PHYSICAL EXAM  ED Triage Vitals   Temp Heart Rate Resp BP SpO2   04/18/25 1529 04/18/25 1529 04/18/25 1529 04/18/25 1530 04/18/25 1529   97.7 °F (36.5 °C) 91 15 143/90 95 %      Temp src Heart Rate Source Patient Position BP Location FiO2 (%)   04/18/25 1529 -- -- -- --   Tympanic           Physical Exam      GENERAL: no acute distress  HENT: nares patent  EYES: no scleral icterus  CV: regular rhythm, normal rate, 2+ DP pulses bilaterally  RESPIRATORY: normal effort  ABDOMEN: soft, nontender  MUSCULOSKELETAL: no deformity, mild pain with flexion of the hip as well as internal and external rotation  NEURO: alert, moves all extremities, follows commands  PSYCH:  calm, cooperative  SKIN: warm, dry, no overlying rash to the right leg    Vital signs and nursing notes reviewed.          LAB RESULTS  No results found for this or any previous visit (from the past 24 hours).    Ordered the above labs and reviewed the results.        RADIOLOGY  CT Pelvis Without Contrast  Result Date: 4/18/2025   CT PELVIS WO CONTRAST-  COMPARISON: CT abdomen pelvis 1/25/2025  HISTORY: Right hip pain, no known injury.  TECHNIQUE: Multidetector helical CT acquisition through the Pelvis is provided without IV contrast. Coronal and sagittal multiplanar reformatted images are provided. Radiation dose reduction techniques were utilized, including automated exposure control, and exposure modulation based on body size.  FINDINGS: Pelvic soft tissues show no evidence of acute abnormality. There is no evidence of acute fracture or dislocation. There are modest degenerative changes in both hips, as well as degenerative change in the pubic symphysis, and sacroiliac joints. There is no lytic or blastic bone lesion.      Degenerative changes, no acute findings.   This report was finalized on 4/18/2025 5:49 PM by Dr. Julio Kelley M.D on  Workstation: [id number]        Ordered the above noted radiological studies. Reviewed by me in PACS.        MEDICATIONS GIVEN IN ER  Medications - No data to display      ORDERS PLACED DURING THIS VISIT:  Orders Placed This Encounter   Procedures    CT Pelvis Without Contrast    Monitor Blood Pressure         OUTPATIENT MEDICATION MANAGEMENT:  No current Epic-ordered facility-administered medications on file.     Current Outpatient Medications Ordered in Epic   Medication Sig Dispense Refill    aspirin 81 MG EC tablet Take 1 tablet by mouth Daily. 90 tablet 11    atorvastatin (LIPITOR) 10 MG tablet TAKE 1 TABLET BY MOUTH DAILY. TO DECREASE RISK OF STROKE AND HEART ATTACK (Patient taking differently: Take 1 tablet by mouth Every Night. To decrease risk of stroke and heart attack) 90 tablet 3    CALCIUM CITRATE PO Take 1 tablet by mouth Every Night. HOLD FOR SURGERY      cholecalciferol (VITAMIN D3) 1000 UNITS tablet Take 1 tablet by mouth Every Night. HOLD FOR SURGERY      coenzyme Q10 100 MG capsule Take 1 capsule by mouth Daily.      DULoxetine (CYMBALTA) 60 MG capsule TAKE 1 CAPSULE EVERY DAY (Patient taking differently: Take 1 capsule by mouth Every Night.) 90 capsule 1    hydroCHLOROthiazide 12.5 MG tablet TAKE 1 TABLET EVERY DAY 90 tablet 3    ketorolac (TORADOL) 10 MG tablet Take 1 tablet by mouth Every 6 (Six) Hours As Needed for Moderate Pain. 12 tablet 0    loratadine (CLARITIN) 10 MG tablet TAKE 1 TABLET EVERY DAY (Patient taking differently: Take 1 tablet by mouth Every Night.) 90 tablet 3    metFORMIN ER (GLUCOPHAGE-XR) 500 MG 24 hr tablet TAKE 1 TABLET EVERY DAY WITH BREAKFAST FOR PREDIABETES 90 tablet 3    predniSONE (DELTASONE) 20 MG tablet Take 3 tablets by mouth Daily for 3 days, THEN 2 tablets Daily for 3 days, THEN 1 tablet Daily for 3 days. 18 tablet 0    promethazine-dextromethorphan (PROMETHAZINE-DM) 6.25-15 MG/5ML syrup Take 5 mL by mouth 4 (Four) Times a Day As Needed for Cough. No driving  for at least 8 hours after taking a dose 180 mL 0       PROCEDURES  Procedures          MEDICAL DECISION MAKING, PROGRESS, and CONSULTS    Discussion below represents my analysis of pertinent findings related to patient's condition, differential diagnosis, treatment plan and final disposition.            Differential diagnosis:    Hip fracture, dislocation, septic joint, tenosynovitis, arthritis               Independent interpretation of labs, radiology studies, and discussions with consultants:  ED Course as of 04/18/25 1810   Fri Apr 18, 2025   1741 CT of the pelvis independently interpreted by myself.  I see no fracture or dislocation. [TD]   1802 CT read by radiology shows degenerative changes in both hips as well as at the pubic symphysis and SI joints. [TD]      ED Course User Index  [TD] Julio Duval II, MD       Patient will use over-the-counter analgesics.  Discharge home.  Follow-up with her orthopedic surgeon.    Clinical Scores:                                   DIAGNOSIS  Final diagnoses:   Right hip pain   Primary osteoarthritis of both hips         DISPOSITION  DISCHARGE    FOLLOW-UP  Hemant Sutherland MD  [address] [facility] [address] [phone]    Schedule an appointment as soon as possible for a visit in 3 days  As needed    [facility] EMERGENCY DEPARTMENT  [address] [facility] [address] [phone]  Go to   If symptoms worsen         Medication List        Changed      atorvastatin 10 MG tablet  Commonly known as: LIPITOR  TAKE 1 TABLET BY MOUTH DAILY. TO DECREASE RISK OF STROKE AND HEART ATTACK  What changed: when to take this     DULoxetine 60 MG capsule  Commonly known as: CYMBALTA  TAKE 1 CAPSULE EVERY DAY  What changed: when to take this     loratadine 10 MG tablet  Commonly known as: CLARITIN  TAKE 1 TABLET EVERY DAY  What changed: when to take this                  Latest Documented Vital Signs:  As of 18:10 EDT  BP- 130/85 HR- 93  Temp- 97.7 °F (36.5 °C) (Tympanic) O2 sat- 94%      --    Please note that portions of this were completed with a voice recognition program.       Note Disclaimer: At [facility], we believe that sharing information builds trust and better relationships. You are receiving this note because you are receiving care at [facility] or recently visited. It is possible you will see health information before a provider has talked with you about it. This kind of information can be easy to misunderstand. To help you fully understand what it means for your health, we urge you to discuss this note with your provider.         Julio Duval II, MD  04/18/25 [id number]

## 2025-04-21 NOTE — PROGRESS NOTES
04/25/25 0001   Pre-Procedure Phone Call   Procedure Time Verified Yes   Arrival Time 1130   Procedure Location Verified Yes   Medical History Reviewed No   NPO Status Reinforced No   Ride and Caregiver Arranged N/A     Spoke to Emerita, informed her to arrive one hour prior to biopsy, can eat and drink and drive self to and from, advised re type of clothing and timeframe for results to come back. Patient verbalized understanding. Mamm nurse provided our contact number should she need to reach us.

## 2025-04-22 ENCOUNTER — TRANSCRIBE ORDERS (OUTPATIENT)
Dept: ADMINISTRATIVE | Facility: HOSPITAL | Age: 70
End: 2025-04-22
Payer: MEDICARE

## 2025-04-22 ENCOUNTER — LAB (OUTPATIENT)
Dept: LAB | Facility: HOSPITAL | Age: 70
End: 2025-04-22
Payer: MEDICARE

## 2025-04-22 DIAGNOSIS — M25.551 RIGHT HIP PAIN: Primary | ICD-10-CM

## 2025-04-22 DIAGNOSIS — M25.551 RIGHT HIP PAIN: ICD-10-CM

## 2025-04-22 LAB
CHROMATIN AB SERPL-ACNC: <10 IU/ML (ref 0–14)
CRP SERPL-MCNC: 0.34 MG/DL (ref 0–0.5)
ERYTHROCYTE [SEDIMENTATION RATE] IN BLOOD: 9 MM/HR (ref 0–30)
URATE SERPL-MCNC: 5.2 MG/DL (ref 2.4–5.7)

## 2025-04-22 PROCEDURE — 36415 COLL VENOUS BLD VENIPUNCTURE: CPT

## 2025-04-22 PROCEDURE — 86038 ANTINUCLEAR ANTIBODIES: CPT

## 2025-04-22 PROCEDURE — 85652 RBC SED RATE AUTOMATED: CPT

## 2025-04-22 PROCEDURE — 84550 ASSAY OF BLOOD/URIC ACID: CPT

## 2025-04-22 PROCEDURE — 86431 RHEUMATOID FACTOR QUANT: CPT

## 2025-04-22 PROCEDURE — 86140 C-REACTIVE PROTEIN: CPT

## 2025-04-23 LAB — ANA SER QL: NEGATIVE

## 2025-04-24 ENCOUNTER — TELEPHONE (OUTPATIENT)
Dept: GASTROENTEROLOGY | Facility: CLINIC | Age: 70
End: 2025-04-24

## 2025-04-24 NOTE — TELEPHONE ENCOUNTER
Caller: Emerita Matthews    Relationship to patient: Self    Best call back number: [phone]     Patient is needing: PATIENT HAS MISPLACED HER PREP INSTRUCTIONS FOR PROCEDURE ON 4/28/25.  PATIENT STATES IT IS OKAY TO SEND ON i2we.

## 2025-04-25 ENCOUNTER — HOSPITAL ENCOUNTER (OUTPATIENT)
Dept: MAMMOGRAPHY | Facility: HOSPITAL | Age: 70
Discharge: HOME | End: 2025-04-25
Payer: MEDICARE

## 2025-04-25 VITALS
HEART RATE: 86 BPM | SYSTOLIC BLOOD PRESSURE: 126 MMHG | RESPIRATION RATE: 16 BRPM | TEMPERATURE: 97.7 F | OXYGEN SATURATION: 98 % | DIASTOLIC BLOOD PRESSURE: 89 MMHG

## 2025-04-25 DIAGNOSIS — R92.8 ABNORMAL MAMMOGRAM: ICD-10-CM

## 2025-04-25 PROCEDURE — 25010000002 LIDOCAINE 1 % SOLUTION: Performed by: INTERNAL MEDICINE

## 2025-04-25 PROCEDURE — 88305 TISSUE EXAM BY PATHOLOGIST: CPT | Performed by: INTERNAL MEDICINE

## 2025-04-25 PROCEDURE — A4648 IMPLANTABLE TISSUE MARKER: HCPCS

## 2025-04-25 PROCEDURE — 25010000002 LIDOCAINE-EPINEPHRINE (PF) 1 %-1:200000 SOLUTION: Performed by: INTERNAL MEDICINE

## 2025-04-25 RX ORDER — LIDOCAINE HYDROCHLORIDE 10 MG/ML
1 INJECTION, SOLUTION INFILTRATION; PERINEURAL ONCE
Status: COMPLETED | OUTPATIENT
Start: 2025-04-25 | End: 2025-04-25

## 2025-04-25 RX ADMIN — Medication 1 ML: at 12:27

## 2025-04-25 RX ADMIN — LIDOCAINE HYDROCHLORIDE 10 ML: 10; .005 INJECTION, SOLUTION EPIDURAL; INFILTRATION; INTRACAUDAL; PERINEURAL at 12:27

## 2025-04-25 NOTE — NURSING NOTE
Vital Signs Stable. Patient denies pain. Medical/surgical history and medications reviewed. No distress noted. Procedural education completed with patient's questions addressed.

## 2025-04-25 NOTE — NURSING NOTE
Biopsy site to Right breast clear. Dermabond dry and intact. No firmness or swelling noted at or around biopsy site.  Ice pack with protective covering applied to biopsy site. Discharge instructions discussed with patient. Patient verbalized understanding. No distress noted. To home via private vehicle.

## 2025-04-25 NOTE — NURSING NOTE
Dr. Mccarty in to speak with patient. Site marked and patient consented. All questions addressed.

## 2025-04-26 ENCOUNTER — TELEPHONE (OUTPATIENT)
Dept: INTERVENTIONAL RADIOLOGY/VASCULAR | Facility: HOSPITAL | Age: 70
End: 2025-04-26
Payer: MEDICARE

## 2025-04-26 NOTE — TELEPHONE ENCOUNTER
"Pt call back on Stereo right Breast biopsy on 4/25/25. Pt stated \"Everything went great\". She took tylenol yesterday and last night and no pain today.  "

## 2025-04-28 ENCOUNTER — OUTSIDE FACILITY SERVICE (OUTPATIENT)
Dept: GASTROENTEROLOGY | Facility: CLINIC | Age: 70
End: 2025-04-28
Payer: MEDICARE

## 2025-04-28 ENCOUNTER — LAB REQUISITION (OUTPATIENT)
Dept: LAB | Facility: HOSPITAL | Age: 70
End: 2025-04-28
Payer: MEDICARE

## 2025-04-28 DIAGNOSIS — D12.8 BENIGN NEOPLASM OF RECTUM: ICD-10-CM

## 2025-04-28 DIAGNOSIS — Z86.0100 PERSONAL HISTORY OF COLON POLYPS, UNSPECIFIED: ICD-10-CM

## 2025-04-28 DIAGNOSIS — K64.0 FIRST DEGREE HEMORRHOIDS: ICD-10-CM

## 2025-04-28 PROCEDURE — 88305 TISSUE EXAM BY PATHOLOGIST: CPT | Performed by: INTERNAL MEDICINE

## 2025-04-29 ENCOUNTER — RESULTS FOLLOW-UP (OUTPATIENT)
Dept: MAMMOGRAPHY | Facility: HOSPITAL | Age: 70
End: 2025-04-29
Payer: MEDICARE

## 2025-04-29 DIAGNOSIS — D24.1 INTRADUCTAL PAPILLOMA OF BREAST, RIGHT: Primary | ICD-10-CM

## 2025-04-29 LAB
CYTO UR: NORMAL
LAB AP CASE REPORT: NORMAL
LAB AP CLINICAL INFORMATION: NORMAL
PATH REPORT.FINAL DX SPEC: NORMAL
PATH REPORT.GROSS SPEC: NORMAL

## 2025-05-04 NOTE — PROGRESS NOTES
GENERAL SURGERY BENIGN BREAST HISTORY AND PHYSICAL     SUMMARY:  Emerita Matthews is a 69 y.o. lady with:  A new diagnosis of a right breast intraductal papilloma.  - We discussed the imaging and pathology.  We discussed she is not at high risk for breast cancer.  We discussed papillomas that are small without atypia and will sampled are okay for observation per current ASBrS guidelines.  She is agreeable to observation and will proceed with mammogram in March 2026.  She can follow-up with me as needed going forward.    High risk screening:   -Jenn Miller lifetime breast cancer risk: 4.6%. This patient does not qualify for high risk screening.    Referring Provider: No ref. provider found    Chief complaint: abnormal breast imaging    HPI: Ms. Emerita Matthews is seen at the request of No ref. provider found. The patient is a 69 y.o. woman being seen for a new diagnosis of a right breast intraductal papilloma.      This was initially detected as an imaging abnormality on routine screening. Her work-up is detailed in the breast history section below. She has had regular annual mammograms. She has had a prior right biopsy last year, which returned as benign. She denies any breast lumps, pain, skin changes, or nipple discharge.    She has a family history of breast cancer in her grandmother at age 50. She denies any family history of ovarian cancer.     TIMELINE OF WORKUP:  3/21/2025 Bilateral Screening Mammogram:   FINDINGS: There are scattered areas of fibroglandular density. Right breast biopsy clip. There is questioned architectural distortion in the upper central middle to posterior right breast, best present on  the cc view. There are no suspicious masses, calcifications, or areas of architectural distortion in the left breast.  IMPRESSION/RECOMMENDATION(S):  Right breast questioned architectural distortion. Recommend further evaluation with CC and MLO spot compression and lateral views with tomosynthesis and targeted  ultrasound. No mammographic evidence of malignancy in the left breast   BI-RADS Category 0: Incomplete    4/17/2025 Right Breast Diagnostic Mammo and US:   There are scattered areas of fibroglandular density. In the upper central to slightly outer middle to posterior right breast, there is persistent question/mild architectural distortion. This is best appreciated on the CC spot compression and lateral views. This area was further assessed with ultrasound.  ULTRASOUND:  Targeted sonographic evaluation of the right breast was performed from 11:00 to 1:00 and retroareolar in the region of the mammographic abnormality. At 12:00 retroareolar, there is a 0.6 x 0.4 x 0.8 cm benign-appearing cluster of cysts, which is incidental. Multiple dilated ducts are identified. No sonographic correlate is identified.   IMPRESSION:  Suspicious question/mild architectural distortion in the right breast without a sonographic correlate. Recommend further evaluation with stereotactic core needle biopsy.  BI-RADS Category 4: Suspicious    4/25/2025 Right Breast Stereotactic Biopsy:   PROCEDURE: The area of interest in the upper middle right breast was/were localized and targeted under stereotactic/tomosynthesis guidance via a(n) lateral approach. The skin of the breast was cleansed and prepped. 1 ml of 1% lidocaine and 10 ml of 1% lidocaine with epinephrine were used for local anesthesia. A 10 gauge needle used witha vacuum assisted biopsy device was advanced into the breast and 5 core specimens were obtained. Specimen radiography demonstrated mixed and  striated density suggestive of the targeted abnormality. A triple twist clip was then deployed at the biopsy site. The patient tolerated the procedure well with no immediate complications. Post-procedural digital mammographic views of the right breast demonstrate the triple twist clip at the expected location at the site of biopsy in the upper slightly outer middle right breast within a  small postbiopsy hematoma measuring approximately 3 cm.  IMPRESSION:  1. Stereotactic/Tomosynthesis guided core needle biopsy of question/mild architectural distortion at 11:00 in the middle right breast, marked with a triple twist biopsy clip. Pathology demonstrates intraductal  papilloma, which is benign and concordant with the imaging assessment.  2. Recommend surgical consultation for risk assessment and consideration of excision.    2025 Pathology:   Final Diagnosis   1.  Breast, right 11 o'clock position, stereotactic core biopsy: (Triple twist clip)  A.  Coalescing nonintact intraductal papillomas measuring 2.5 mm maximally with ductal hyperplasia of the usual type, involving a single core fragment.               B.  Clustered apocrine cysts.     MEDICAL HISTORY:   Gynecologic History:   . P:3 AB:1  Age at first childbirth: 18  Lactation/How long: none  Age at menarche: 13  Age at menopause: 44  Total years of oral contraceptive use: 20years previously  Total years of hormone replacement therapy: none    Past Medical History:   HTN  HLD    Past Surgical History:    Past Surgical History:   Procedure Laterality Date    BREAST BIOPSY  ?    CHOLECYSTECTOMY WITH INTRAOPERATIVE CHOLANGIOGRAM N/A 2023    Procedure: Laparoscopic cholecystectomy with cholangiogram;  Surgeon: Dorina Rehman MD;  [facility]: [facility];  Service: General;  Laterality: N/A;    COLONOSCOPY N/A     NBIH otherwise normal-Dr. Hull    DENTAL PROCEDURE      implants AGE 60 LOST BABY TEETH    DILATATION AND CURETTAGE      EYE SURGERY      KIDNEY STONE SURGERY      TUBAL ABDOMINAL LIGATION      UMBILICAL HERNIA REPAIR N/A 2023    Procedure: Open periumbilical hernia repair with mesh;  Surgeon: Dorina Rehman MD;  Location: [facility] OR;  Service: General;  Laterality: N/A;    VENTRAL HERNIA REPAIR N/A 2025    Procedure: Laparoscopic robotic-assisted ventral/incisional hernia repair with  mesh;  Surgeon: Dorina Rehman MD;  Location: [facility] MAIN OR;  Service: Robotics - DaVinci;  Laterality: N/A;    WISDOM TOOTH EXTRACTION         Family History:    As above    Social History:   Denies tobacco use  Occasional alcohol use    Allergies:   Allergies   Allergen Reactions    Other Rash     Trace Metals     Medications:     Current Outpatient Medications:     aspirin 81 MG EC tablet, Take 1 tablet by mouth Daily., Disp: 90 tablet, Rfl: 11    atorvastatin (LIPITOR) 10 MG tablet, TAKE 1 TABLET BY MOUTH DAILY. TO DECREASE RISK OF STROKE AND HEART ATTACK (Patient taking differently: Take 1 tablet by mouth Every Night. To decrease risk of stroke and heart attack), Disp: 90 tablet, Rfl: 3    CALCIUM CITRATE PO, Take 1 tablet by mouth Every Night. HOLD FOR SURGERY, Disp: , Rfl:     cholecalciferol (VITAMIN D3) 1000 UNITS tablet, Take 1 tablet by mouth Every Night. HOLD FOR SURGERY, Disp: , Rfl:     coenzyme Q10 100 MG capsule, Take 1 capsule by mouth Daily., Disp: , Rfl:     DULoxetine (CYMBALTA) 60 MG capsule, TAKE 1 CAPSULE EVERY DAY (Patient taking differently: Take 1 capsule by mouth Every Night.), Disp: 90 capsule, Rfl: 1    hydroCHLOROthiazide 12.5 MG tablet, TAKE 1 TABLET EVERY DAY, Disp: 90 tablet, Rfl: 3    ketorolac (TORADOL) 10 MG tablet, Take 1 tablet by mouth Every 6 (Six) Hours As Needed for Moderate Pain. (Patient not taking: Reported on 4/25/2025), Disp: 12 tablet, Rfl: 0    loratadine (CLARITIN) 10 MG tablet, TAKE 1 TABLET EVERY DAY (Patient taking differently: Take 1 tablet by mouth Every Night.), Disp: 90 tablet, Rfl: 3    metFORMIN ER (GLUCOPHAGE-XR) 500 MG 24 hr tablet, TAKE 1 TABLET EVERY DAY WITH BREAKFAST FOR PREDIABETES, Disp: 90 tablet, Rfl: 3    promethazine-dextromethorphan (PROMETHAZINE-DM) 6.25-15 MG/5ML syrup, Take 5 mL by mouth 4 (Four) Times a Day As Needed for Cough. No driving for at least 8 hours after taking a dose (Patient not taking: Reported on 4/25/2025), Disp: 180  mL, Rfl: 0    Labs:    Labs from 4/22/2025 personally reviewed by me     ROS:   Influenza-like illness: no fever, no  cough, no  sore throat, no  body aches, no loss of sense of taste or smell, no known exposure to person with Covid-19.  Constitutional: Negative for fevers or chills  HENT: Negative for hearing loss or runny nose  Eyes: Negative for vision changes or scleral icterus  Respiratory: Negative for cough or shortness of breath  Cardiovascular: Negative for chest pain or heart palpitations  Gastrointestinal: Negative for abdominal pain, nausea, vomiting, constipation, melena, or hematochezia  Genitourinary: Negative for hematuria or dysuria  Musculoskeletal: Negative for joint swelling or gait instability  Neurologic: Negative for tremors or seizures  Psychiatric: Negative for suicidal ideations or depression  All other systems reviewed and negative    PHYSICAL EXAM:   Constitutional: Well-developed well-nourished, no acute distress  Eyes: Conjunctiva normal, sclera nonicteric  ENMT: Hearing grossly normal, oral mucosa moist  Neck: Supple, no palpable mass, trachea midline  Respiratory: Clear to auscultation, normal inspiratory effort  Cardiovascular: Regular rate, no murmur, no peripheral edema, no jugular venous distention  Breast: symmetric  Right: No visible abnormalities on inspection while seated, with arms raised or hands on hips. No masses, skin changes, or nipple abnormalities.  Left:  No visible abnormalities on inspection while seated, with arms raised or hands on hips. No masses, skin changes, or nipple abnormalities.  Biopsy site appreciated in the right breast, otherwise no skin changes.   No clinical chest wall involvement.  Gastrointestinal: Soft, nontender  Lymphatics (palpable nodes): No cervical, supraclavicular or axillary lymphadenopathy  Skin:  Warm, dry, no rash on visualized skin surfaces  Musculoskeletal: Symmetric strength, normal gait  Psychiatric: Alert and oriented ×3, normal  affect     BILLY HUTCHINSON M.D.  General and Endoscopic Surgery  [facility]    [address], [address]  [place], [place], [address]  P: [phone]  F: [phone]

## 2025-05-05 ENCOUNTER — OFFICE VISIT (OUTPATIENT)
Dept: SURGERY | Facility: CLINIC | Age: 70
End: 2025-05-05
Payer: MEDICARE

## 2025-05-05 VITALS
OXYGEN SATURATION: 98 % | WEIGHT: 228.6 LBS | HEIGHT: 65 IN | BODY MASS INDEX: 38.09 KG/M2 | HEART RATE: 88 BPM | SYSTOLIC BLOOD PRESSURE: 126 MMHG | DIASTOLIC BLOOD PRESSURE: 84 MMHG

## 2025-05-05 DIAGNOSIS — D36.9 INTRADUCTAL PAPILLOMA: Primary | ICD-10-CM

## 2025-05-05 PROCEDURE — 99213 OFFICE O/P EST LOW 20 MIN: CPT | Performed by: STUDENT IN AN ORGANIZED HEALTH CARE EDUCATION/TRAINING PROGRAM

## 2025-05-05 PROCEDURE — 1160F RVW MEDS BY RX/DR IN RCRD: CPT | Performed by: STUDENT IN AN ORGANIZED HEALTH CARE EDUCATION/TRAINING PROGRAM

## 2025-05-05 PROCEDURE — 1159F MED LIST DOCD IN RCRD: CPT | Performed by: STUDENT IN AN ORGANIZED HEALTH CARE EDUCATION/TRAINING PROGRAM

## 2025-05-12 ENCOUNTER — OFFICE VISIT (OUTPATIENT)
Dept: FAMILY MEDICINE CLINIC | Facility: CLINIC | Age: 70
End: 2025-05-12
Payer: MEDICARE

## 2025-05-12 VITALS
WEIGHT: 229.2 LBS | HEIGHT: 65 IN | SYSTOLIC BLOOD PRESSURE: 136 MMHG | DIASTOLIC BLOOD PRESSURE: 97 MMHG | BODY MASS INDEX: 38.19 KG/M2 | TEMPERATURE: 97.3 F | HEART RATE: 87 BPM | OXYGEN SATURATION: 96 %

## 2025-05-12 DIAGNOSIS — L98.9 FINGER LESION: Primary | ICD-10-CM

## 2025-05-12 PROCEDURE — 99212 OFFICE O/P EST SF 10 MIN: CPT | Performed by: NURSE PRACTITIONER

## 2025-05-12 PROCEDURE — 1126F AMNT PAIN NOTED NONE PRSNT: CPT | Performed by: NURSE PRACTITIONER

## 2025-05-12 NOTE — PROGRESS NOTES
"Chief Complaint  wart on finger (Left hand. Middle finger. Started a few days ago. Hasn't tried anything over the counter. )    Subjective        Emerita Matthews presents to [facility]  History of Present Illness    Objective   Vital Signs:  /97 (BP Location: Right arm, Patient Position: Sitting, Cuff Size: Adult)   Pulse 87   Temp 97.3 °F (36.3 °C) (Temporal)   Ht 165.1 cm (65\")   Wt 104 kg (229 lb 3.2 oz)   SpO2 96%   BMI 38.14 kg/m²   Estimated body mass index is 38.14 kg/m² as calculated from the following:    Height as of this encounter: 165.1 cm (65\").    Weight as of this encounter: 104 kg (229 lb 3.2 oz).            Physical Exam  Constitutional:       General: She is not in acute distress.     Appearance: Normal appearance. She is not ill-appearing, toxic-appearing or diaphoretic.      Comments: Pleasant smiling appears well energetic as always   Eyes:      Conjunctiva/sclera: Conjunctivae normal.      Pupils: Pupils are equal, round, and reactive to light.   Musculoskeletal:      Comments: Left middle on the volar surface tip is a smooth elevated 3 approximately millimeter raised, lesion consistent with likely contact dermatitis, no evidence of wart   Skin:     General: Skin is warm.   Neurological:      General: No focal deficit present.      Mental Status: Mental status is at baseline.   Psychiatric:         Mood and Affect: Mood normal.         Thought Content: Thought content normal.         Judgment: Judgment normal.      Result Review :                Assessment and Plan   Diagnoses and all orders for this visit:    1. Finger lesion (Primary)             Follow Up   No follow-ups on file.  Patient was given instructions and counseling regarding her condition or for health maintenance advice. Please see specific information pulled into the AVS if appropriate.       Patient Instructions   Discharge instructions, no evidence of deep wart it looks like it may be some " contact dermatitis excetra time will tell  It should go away over the next 2 to 3 weeks, if needed hydrocortisone cream, Benadryl    Otherwise if it is not resolved over next 6 weeks call for referral to hand specialist for removal or Derm,    Simply recheck your blood pressure I would check it weekly at rest to make sure it is nice, should average less than 130/80    If average in much higher,  Follow-up with us several weeks of numbers if able,  And bring in couple weeks of blood pressure numbers just check once or twice before coming in to see Dr. Alex as for

## 2025-05-12 NOTE — PATIENT INSTRUCTIONS
Discharge instructions, no evidence of deep wart it looks like it may be some contact dermatitis excetra time will tell  It should go away over the next 2 to 3 weeks, if needed hydrocortisone cream, Benadryl    Otherwise if it is not resolved over next 6 weeks call for referral to hand specialist for removal or Derm,    Simply recheck your blood pressure I would check it weekly at rest to make sure it is nice, should average less than 130/80    If average in much higher,  Follow-up with us several weeks of numbers if able,  And bring in couple weeks of blood pressure numbers just check once or twice before coming in to see Dr. Alex as for

## 2025-05-23 ENCOUNTER — HOSPITAL ENCOUNTER (EMERGENCY)
Facility: HOSPITAL | Age: 70
Discharge: HOME | End: 2025-05-23
Attending: EMERGENCY MEDICINE
Payer: MEDICARE

## 2025-05-23 ENCOUNTER — APPOINTMENT (OUTPATIENT)
Dept: ULTRASOUND IMAGING | Facility: HOSPITAL | Age: 70
End: 2025-05-23
Payer: MEDICARE

## 2025-05-23 VITALS
DIASTOLIC BLOOD PRESSURE: 84 MMHG | RESPIRATION RATE: 16 BRPM | TEMPERATURE: 98.5 F | SYSTOLIC BLOOD PRESSURE: 129 MMHG | HEART RATE: 83 BPM | OXYGEN SATURATION: 96 %

## 2025-05-23 DIAGNOSIS — N61.0 ACUTE MASTITIS OF RIGHT BREAST: Primary | ICD-10-CM

## 2025-05-23 LAB
ALBUMIN SERPL-MCNC: 4 G/DL (ref 3.5–5.2)
ALBUMIN/GLOB SERPL: 1.3 G/DL
ALP SERPL-CCNC: 60 U/L (ref 39–117)
ALT SERPL W P-5'-P-CCNC: 13 U/L (ref 1–33)
ANION GAP SERPL CALCULATED.3IONS-SCNC: 14 MMOL/L (ref 5–15)
AST SERPL-CCNC: 14 U/L (ref 1–32)
BASOPHILS # BLD AUTO: 0.02 10*3/MM3 (ref 0–0.2)
BASOPHILS NFR BLD AUTO: 0.2 % (ref 0–1.5)
BILIRUB SERPL-MCNC: 1.7 MG/DL (ref 0–1.2)
BUN SERPL-MCNC: 15 MG/DL (ref 8–23)
BUN/CREAT SERPL: 18.8 (ref 7–25)
CALCIUM SPEC-SCNC: 9.2 MG/DL (ref 8.6–10.5)
CHLORIDE SERPL-SCNC: 101 MMOL/L (ref 98–107)
CO2 SERPL-SCNC: 21 MMOL/L (ref 22–29)
CREAT SERPL-MCNC: 0.8 MG/DL (ref 0.57–1)
DEPRECATED RDW RBC AUTO: 42.6 FL (ref 37–54)
EGFRCR SERPLBLD CKD-EPI 2021: 79.9 ML/MIN/1.73
EOSINOPHIL # BLD AUTO: 0.12 10*3/MM3 (ref 0–0.4)
EOSINOPHIL NFR BLD AUTO: 1 % (ref 0.3–6.2)
ERYTHROCYTE [DISTWIDTH] IN BLOOD BY AUTOMATED COUNT: 13.2 % (ref 12.3–15.4)
GLOBULIN UR ELPH-MCNC: 3 GM/DL
GLUCOSE SERPL-MCNC: 107 MG/DL (ref 65–99)
HCT VFR BLD AUTO: 40.6 % (ref 34–46.6)
HGB BLD-MCNC: 13.8 G/DL (ref 12–15.9)
IMM GRANULOCYTES # BLD AUTO: 0.03 10*3/MM3 (ref 0–0.05)
IMM GRANULOCYTES NFR BLD AUTO: 0.3 % (ref 0–0.5)
LYMPHOCYTES # BLD AUTO: 2.79 10*3/MM3 (ref 0.7–3.1)
LYMPHOCYTES NFR BLD AUTO: 23.5 % (ref 19.6–45.3)
MCH RBC QN AUTO: 29.7 PG (ref 26.6–33)
MCHC RBC AUTO-ENTMCNC: 34 G/DL (ref 31.5–35.7)
MCV RBC AUTO: 87.5 FL (ref 79–97)
MONOCYTES # BLD AUTO: 1.12 10*3/MM3 (ref 0.1–0.9)
MONOCYTES NFR BLD AUTO: 9.4 % (ref 5–12)
NEUTROPHILS NFR BLD AUTO: 65.6 % (ref 42.7–76)
NEUTROPHILS NFR BLD AUTO: 7.81 10*3/MM3 (ref 1.7–7)
NRBC BLD AUTO-RTO: 0 /100 WBC (ref 0–0.2)
PLATELET # BLD AUTO: 215 10*3/MM3 (ref 140–450)
PMV BLD AUTO: 9.3 FL (ref 6–12)
POTASSIUM SERPL-SCNC: 3.4 MMOL/L (ref 3.5–5.2)
PROT SERPL-MCNC: 7 G/DL (ref 6–8.5)
RBC # BLD AUTO: 4.64 10*6/MM3 (ref 3.77–5.28)
SODIUM SERPL-SCNC: 136 MMOL/L (ref 136–145)
WBC NRBC COR # BLD AUTO: 11.89 10*3/MM3 (ref 3.4–10.8)

## 2025-05-23 PROCEDURE — 76642 ULTRASOUND BREAST LIMITED: CPT

## 2025-05-23 PROCEDURE — 85025 COMPLETE CBC W/AUTO DIFF WBC: CPT | Performed by: PHYSICIAN ASSISTANT

## 2025-05-23 PROCEDURE — 80053 COMPREHEN METABOLIC PANEL: CPT | Performed by: PHYSICIAN ASSISTANT

## 2025-05-23 PROCEDURE — 99284 EMERGENCY DEPT VISIT MOD MDM: CPT

## 2025-05-23 RX ORDER — CEPHALEXIN 500 MG/1
500 CAPSULE ORAL ONCE
Status: COMPLETED | OUTPATIENT
Start: 2025-05-23 | End: 2025-05-23

## 2025-05-23 RX ORDER — CEPHALEXIN 500 MG/1
500 CAPSULE ORAL 4 TIMES DAILY
Qty: 40 CAPSULE | Refills: 0 | Status: SHIPPED | OUTPATIENT
Start: 2025-05-23 | End: 2025-06-02

## 2025-05-23 RX ORDER — SODIUM CHLORIDE 0.9 % (FLUSH) 0.9 %
10 SYRINGE (ML) INJECTION AS NEEDED
Status: DISCONTINUED | OUTPATIENT
Start: 2025-05-23 | End: 2025-05-24 | Stop reason: HOSPADM

## 2025-05-23 RX ADMIN — CEPHALEXIN 500 MG: 500 CAPSULE ORAL at 21:58

## 2025-05-24 NOTE — ED PROVIDER NOTES
EMERGENCY DEPARTMENT ENCOUNTER      PCP: Josselyn Alex MD  Patient Care Team:  Josselyn Alex MD as PCP - General (Internal Medicine)  Ja Munoz MD as Consulting Physician (Pulmonary Disease)   Independent Historians: Patient    HPI:  Chief Complaint: Breast pain   A complete HPI/ROS/PMH/PSH/SH/FH are unobtainable due to: None    Chronic or social conditions impacting patient care (social determinants of health): None    Context: Emerita Matthews is a 69 y.o. female who presents to the ED c/o acute right breast pain and redness that began yesterday morning. Pt reports having had a breast biopsy done about 1 month ago but states current redness is not in same area. She denies any fevers. No hx MRSA.    Review of prior external notes and/or external test results outside of this encounter: Pathology from biopsy 4/25/25 showed intraductal papilloma which is benign and concordant with imaging assessment.      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    Fatty liver     Headache, tension-type     Hyperlipidemia     Hypertension     Kidney stones 08/01/2019     Memory loss     Obesity 1975    Plantar fasciitis     Prediabetes     Ventral hernia            PAST SURGICAL HISTORY  Past Surgical History:   Procedure Laterality Date    BREAST BIOPSY  ?    CHOLECYSTECTOMY      CHOLECYSTECTOMY WITH INTRAOPERATIVE CHOLANGIOGRAM N/A 2023    Procedure: Laparoscopic cholecystectomy with cholangiogram;  Surgeon: Dorina Rehman MD;  Location:  [facility];  Service: General;  Laterality: N/A;    COLONOSCOPY N/A     NBIH otherwise normal-Dr. Hull    DENTAL PROCEDURE      implants AGE 60 LOST BABY TEETH    DILATATION AND CURETTAGE      EYE SURGERY      KIDNEY STONE SURGERY      TUBAL ABDOMINAL LIGATION      UMBILICAL HERNIA REPAIR N/A 2023    Procedure: Open periumbilical hernia repair with mesh;  Surgeon: Dorina Rehman MD;  Location:  [facility];  Service: General;  Laterality: N/A;    VENTRAL HERNIA REPAIR N/A 2025    Procedure: Laparoscopic robotic-assisted ventral/incisional hernia repair with mesh;  Surgeon: Dorina Rehman MD;  Location: [facility] MAIN OR;  Service: Robotics - DaVinci;  Laterality: N/A;    WISDOM TOOTH EXTRACTION           FAMILY HISTORY  Family History   Problem Relation Age of Onset    COPD Mother     Macular degeneration Mother     Arthritis Mother     Cancer Mother 86        Bladder    Hearing loss Mother     Other Father         Progressive supranuclear palsy    Parkinsonism Brother     Gallbladder disease Daughter     Breast cancer Maternal Grandmother 50        Diagnosed around 50 and  at 54 per patient    Cancer Maternal Grandmother         Breast    Arthritis Paternal Grandmother     Alcohol abuse Maternal Grandfather     Early death Paternal Grandfather     Arthritis Sister     Other Brother         Parkinson’s    Malig Hyperthermia Neg Hx          SOCIAL HISTORY  Social History     Socioeconomic History    Marital status:    Tobacco Use    Smoking status: Never     Passive exposure:  Never    Smokeless tobacco: Never   Vaping Use    Vaping status: Never Used   Substance and Sexual Activity    Alcohol use: Yes     Comment: Seldom  not even once a month    Drug use: Not Currently     Types: Marijuana     Comment: VERY SELDOM AT TIMES    Sexual activity: Not Currently     Partners: Male     Birth control/protection: Abstinence, Tubal ligation     Comment: Ednzel is not able         ALLERGIES  Other        REVIEW OF SYSTEMS  Review of Systems   Constitutional:  Negative for fever.   Skin:  Positive for color change (erythema R breast).        All systems reviewed and negative except for those discussed in HPI.       PHYSICAL EXAM    I have reviewed the triage vital signs and nursing notes.    ED Triage Vitals   Temp Heart Rate Resp BP SpO2   05/23/25 1749 05/23/25 1749 05/23/25 1749 05/23/25 1750 05/23/25 1749   98.5 °F (36.9 °C) 108 16 130/84 99 %      Temp src Heart Rate Source Patient Position BP Location FiO2 (%)   -- -- -- -- --              Physical Exam  GENERAL: alert, no acute distress  SKIN: Warm, dry, there is a small area of erythema and mild induration of the R breast just superior to the right areola, no fluctuance  HENT: Normocephalic, atraumatic  EYES: no scleral icterus  CV: regular rhythm, regular rate  RESPIRATORY: normal effort, lungs clear  ABDOMEN: soft, nontender, nondistended  MUSCULOSKELETAL: no deformity  NEURO: alert, moves all extremities, follows commands          LAB RESULTS  Recent Results (from the past 24 hours)   Comprehensive Metabolic Panel    Collection Time: 05/23/25  8:35 PM    Specimen: Blood   Result Value Ref Range    Glucose 107 (H) 65 - 99 mg/dL    BUN 15 8 - 23 mg/dL    Creatinine 0.80 0.57 - 1.00 mg/dL    Sodium 136 136 - 145 mmol/L    Potassium 3.4 (L) 3.5 - 5.2 mmol/L    Chloride 101 98 - 107 mmol/L    CO2 21.0 (L) 22.0 - 29.0 mmol/L    Calcium 9.2 8.6 - 10.5 mg/dL    Total Protein 7.0 6.0 - 8.5 g/dL    Albumin 4.0 3.5 - 5.2 g/dL    ALT (SGPT) 13 1 - 33  U/L    AST (SGOT) 14 1 - 32 U/L    Alkaline Phosphatase 60 39 - 117 U/L    Total Bilirubin 1.7 (H) 0.0 - 1.2 mg/dL    Globulin 3.0 gm/dL    A/G Ratio 1.3 g/dL    BUN/Creatinine Ratio 18.8 7.0 - 25.0    Anion Gap 14.0 5.0 - 15.0 mmol/L    eGFR 79.9 >60.0 mL/min/1.73   CBC Auto Differential    Collection Time: 05/23/25  8:35 PM    Specimen: Blood   Result Value Ref Range    WBC 11.89 (H) 3.40 - 10.80 10*3/mm3    RBC 4.64 3.77 - 5.28 10*6/mm3    Hemoglobin 13.8 12.0 - 15.9 g/dL    Hematocrit 40.6 34.0 - 46.6 %    MCV 87.5 79.0 - 97.0 fL    MCH 29.7 26.6 - 33.0 pg    MCHC 34.0 31.5 - 35.7 g/dL    RDW 13.2 12.3 - 15.4 %    RDW-SD 42.6 37.0 - 54.0 fl    MPV 9.3 6.0 - 12.0 fL    Platelets 215 140 - 450 10*3/mm3    Neutrophil % 65.6 42.7 - 76.0 %    Lymphocyte % 23.5 19.6 - 45.3 %    Monocyte % 9.4 5.0 - 12.0 %    Eosinophil % 1.0 0.3 - 6.2 %    Basophil % 0.2 0.0 - 1.5 %    Immature Grans % 0.3 0.0 - 0.5 %    Neutrophils, Absolute 7.81 (H) 1.70 - 7.00 10*3/mm3    Lymphocytes, Absolute 2.79 0.70 - 3.10 10*3/mm3    Monocytes, Absolute 1.12 (H) 0.10 - 0.90 10*3/mm3    Eosinophils, Absolute 0.12 0.00 - 0.40 10*3/mm3    Basophils, Absolute 0.02 0.00 - 0.20 10*3/mm3    Immature Grans, Absolute 0.03 0.00 - 0.05 10*3/mm3    nRBC 0.0 0.0 - 0.2 /100 WBC       Ordered the above labs and independently reviewed and interpreted the results.        RADIOLOGY  US Breast Right Limited  Result Date: 5/23/2025  Patient: GREG SCHWARZ  Time Out: 21:10 Exam(s): US RIGHT BREAST EXAM:   US Right Breast, Limited CLINICAL HISTORY:      Redness, Pain to right breast, eval for abscess.  Patient had a stereotactic biopsy performed on 4 25 2025 TECHNIQUE:   Limited real time ultrasound of the right breast with image documentation, including axilla when performed. COMPARISON:   Right breast ultrasound 2 28 2024 FINDINGS:   Right breast:  Targeted ultrasound of the right breast demonstrates minimally edematous fibroglandular tissue without organized fluid  collection or mass. IMPRESSION:       Targeted ultrasound of the right breast demonstrates minimally edematous fibroglandular tissue without organized fluid collection or mass.  Follow-up should be determined based on stereotactic biopsy results. ASSESSMENT:   BI-RADS 2: Benign.     Electronically signed by Nicole Dewey MD on 05-23-25 at 2110      I ordered the above noted radiological studies. Independently reviewed and interpreted by me.  See dictation for official radiology interpretation.      PROCEDURES    Procedures      MEDICATIONS GIVEN IN ER    Medications   cephalexin (KEFLEX) capsule 500 mg (500 mg Oral Given 5/23/25 2158)         PROGRESS, DATA ANALYSIS, CONSULTS, AND MEDICAL DECISION MAKING    All labs have been independently reviewed and interpreted by me.  All radiology studies have been independently reviewed and interpreted by me and discussed with radiologist dictating the report.   EKG's independently reviewed and interpreted by me.  Discussion below represents my analysis of pertinent findings related to patient's condition, differential diagnosis, treatment plan and final disposition.    Differential diagnosis: cellulitis, abscess, mass    ED Course as of 05/24/25 2232   Fri May 23, 2025   2159 WBC(!): 11.89 [DC]   2159 No drainable fluid collection or mass noted on US. Will treat for mastitis. Pt has established breast surgery follow up. Return precautions given. [DC]      ED Course User Index  [DC] Dorina Velasquez PA             AS OF 22:32 EDT VITALS:    BP - 129/84  HR - 83  TEMP - 98.5 °F (36.9 °C)  O2 SATS - 96%        DIAGNOSIS  Final diagnoses:   Acute mastitis of right breast         DISPOSITION  ED Disposition       ED Disposition   Discharge    Condition   Stable    Comment   --                  Note Disclaimer: At [facility], we believe that sharing information builds trust and better relationships. You are receiving this note because you recently visited [facility]. It is  possible you will see health information before a provider has talked with you about it. This kind of information can be easy to misunderstand. To help you fully understand what it means for your health, we urge you to discuss this note with your provider.         Dorina Velasquez PA  05/24/25 [id number]

## 2025-05-24 NOTE — ED PROVIDER NOTES
MD ATTESTATION NOTE  I supervised care provided by the APC. We have discussed this patient's history, physical exam, and treatment plan. I have reviewed the APC's note and I agree with the APC's findings and plan of care.   SHARED VISIT: This visit was performed by BOTH a physician and an APC. The substantive portion of the medical decision making was performed by this attesting physician who made or approved the management plan and takes responsibility for patient management. All studies in the APC note (if performed) were independently interpreted by me.   I have personally had a face to face encounter with the patient.     PCP: Josselyn Alex MD  Patient Care Team:  Josselyn Alex MD as PCP - General (Internal Medicine)  Ja Munoz MD as Consulting Physician (Pulmonary Disease)     Emerita Matthews is a 69 y.o. female who presents to the ED c/o right breast pain.  Patient has had right breast pain for the past 2 to 3 days.  She is also noted redness of the skin.  She noted a small amount of drainage that she saw on her bra but she is unsure of the source.  No associated fevers or chills.  No prior history of breast infection.  She did have a mammogram and biopsy 1 month ago which was found to have some ductal papillomas but did not warrant any further procedure.    On exam:  Chaperoned by Nikky, [employer] tech  General: NAD.  Head: NCAT.  ENT: nares patent, no scleral icterus  Neck: Supple, trachea midline.  Cardiac: regular rate and rhythm.  Lungs: normal effort, clear to auscultation bilaterally  Breast: Right breast has redness and warmth superior to the nipple without obvious underlying fluctuance or abscess  Abdomen: Soft, nondistended, NTTP, no rebound tenderness, no guarding or rigidity.   Extremities: Moves all extremities well, no peripheral edema  Neuro: alert, MAEW, follows commands  Psych: calm, cooperative  Skin: Warm, dry.    Medical Decision Making:  After the initial H&P, I discussed pertinent  information from history and physical exam with patient/family.  Discussed differential diagnosis.  Discussed plan for ED evaluation/work-up/treatment.  All questions answered.  Patient/family is agreeable with plan.    ED Course as of 05/23/25 2243   Fri May 23, 2025   2159 WBC(!): 11.89 [DC]   2159 No drainable fluid collection or mass noted on US. Will treat for mastitis. Pt has established breast surgery follow up. Return precautions given. [DC]      ED Course User Index  [DC] Dorina Velasquez PA       Diagnosis  Final diagnoses:   Acute mastitis of right breast        Adolfo La MD  05/23/25 2243

## 2025-05-30 ENCOUNTER — OFFICE VISIT (OUTPATIENT)
Dept: SURGERY | Facility: CLINIC | Age: 70
End: 2025-05-30
Payer: MEDICARE

## 2025-05-30 VITALS
OXYGEN SATURATION: 98 % | HEIGHT: 65 IN | BODY MASS INDEX: 37.29 KG/M2 | HEART RATE: 91 BPM | SYSTOLIC BLOOD PRESSURE: 130 MMHG | DIASTOLIC BLOOD PRESSURE: 84 MMHG | WEIGHT: 223.8 LBS

## 2025-05-30 DIAGNOSIS — N61.0 MASTITIS: Primary | ICD-10-CM

## 2025-05-30 PROCEDURE — 1160F RVW MEDS BY RX/DR IN RCRD: CPT | Performed by: STUDENT IN AN ORGANIZED HEALTH CARE EDUCATION/TRAINING PROGRAM

## 2025-05-30 PROCEDURE — 1159F MED LIST DOCD IN RCRD: CPT | Performed by: STUDENT IN AN ORGANIZED HEALTH CARE EDUCATION/TRAINING PROGRAM

## 2025-05-30 PROCEDURE — 99212 OFFICE O/P EST SF 10 MIN: CPT | Performed by: STUDENT IN AN ORGANIZED HEALTH CARE EDUCATION/TRAINING PROGRAM

## 2025-05-30 NOTE — PROGRESS NOTES
GENERAL SURGERY BENIGN BREAST HISTORY AND PHYSICAL     SUMMARY:  Emerita Matthews is a 69 y.o. lady with:  Right breast mastitis.  -Now clinically improved.  Having no further issues.  She will call me if symptoms return or skin changes develop and we will proceed with additional imaging.    A history of a right breast intraductal papilloma.  - We discussed the imaging and pathology.  We discussed she is not at high risk for breast cancer.  We discussed papillomas that are small without atypia and will sampled are okay for observation per current ASBrS guidelines.  She is agreeable to observation and will proceed with mammogram in March 2026.  She can follow-up with me as needed going forward.    High risk screening:   -Jenn Miller lifetime breast cancer risk: 4.6%. This patient does not qualify for high risk screening.    Referring Provider: No ref. provider found    Chief complaint: abnormal breast imaging    HPI: Ms. Emerita Matthews is seen at the request of No ref. provider found. The patient is a 69 y.o. woman being seen for a new diagnosis of a right breast intraductal papilloma.      This was initially detected as an imaging abnormality on routine screening. Her work-up is detailed in the breast history section below. She has had regular annual mammograms. She has had a prior right biopsy last year, which returned as benign. She denies any breast lumps, pain, skin changes, or nipple discharge.    She has a family history of breast cancer in her grandmother at age 50. She denies any family history of ovarian cancer.     5/30/2025 she presents today for follow-up.  She did well since I last saw her.  She did develop some mastitis with her breast near the biopsy site.  She has been on antibiotics and this is resolved.  She has no further concerns.    TIMELINE OF WORKUP:  3/21/2025 Bilateral Screening Mammogram:   FINDINGS: There are scattered areas of fibroglandular density. Right breast biopsy clip. There is questioned  architectural distortion in the upper central middle to posterior right breast, best present on  the cc view. There are no suspicious masses, calcifications, or areas of architectural distortion in the left breast.  IMPRESSION/RECOMMENDATION(S):  Right breast questioned architectural distortion. Recommend further evaluation with CC and MLO spot compression and lateral views with tomosynthesis and targeted ultrasound. No mammographic evidence of malignancy in the left breast   BI-RADS Category 0: Incomplete    4/17/2025 Right Breast Diagnostic Mammo and US:   There are scattered areas of fibroglandular density. In the upper central to slightly outer middle to posterior right breast, there is persistent question/mild architectural distortion. This is best appreciated on the CC spot compression and lateral views. This area was further assessed with ultrasound.  ULTRASOUND:  Targeted sonographic evaluation of the right breast was performed from 11:00 to 1:00 and retroareolar in the region of the mammographic abnormality. At 12:00 retroareolar, there is a 0.6 x 0.4 x 0.8 cm benign-appearing cluster of cysts, which is incidental. Multiple dilated ducts are identified. No sonographic correlate is identified.   IMPRESSION:  Suspicious question/mild architectural distortion in the right breast without a sonographic correlate. Recommend further evaluation with stereotactic core needle biopsy.  BI-RADS Category 4: Suspicious    4/25/2025 Right Breast Stereotactic Biopsy:   PROCEDURE: The area of interest in the upper middle right breast was/were localized and targeted under stereotactic/tomosynthesis guidance via a(n) lateral approach. The skin of the breast was cleansed and prepped. 1 ml of 1% lidocaine and 10 ml of 1% lidocaine with epinephrine were used for local anesthesia. A 10 gauge needle used witha vacuum assisted biopsy device was advanced into the breast and 5 core specimens were obtained. Specimen radiography  demonstrated mixed and  striated density suggestive of the targeted abnormality. A triple twist clip was then deployed at the biopsy site. The patient tolerated the procedure well with no immediate complications. Post-procedural digital mammographic views of the right breast demonstrate the triple twist clip at the expected location at the site of biopsy in the upper slightly outer middle right breast within a small postbiopsy hematoma measuring approximately 3 cm.  IMPRESSION:  1. Stereotactic/Tomosynthesis guided core needle biopsy of question/mild architectural distortion at 11:00 in the middle right breast, marked with a triple twist biopsy clip. Pathology demonstrates intraductal  papilloma, which is benign and concordant with the imaging assessment.  2. Recommend surgical consultation for risk assessment and consideration of excision.    2025 Pathology:   Final Diagnosis   1.  Breast, right 11 o'clock position, stereotactic core biopsy: (Triple twist clip)  A.  Coalescing nonintact intraductal papillomas measuring 2.5 mm maximally with ductal hyperplasia of the usual type, involving a single core fragment.               B.  Clustered apocrine cysts.     MEDICAL HISTORY:   Gynecologic History:   . P:3 AB:1  Age at first childbirth: 18  Lactation/How long: none  Age at menarche: 13  Age at menopause: 44  Total years of oral contraceptive use: 20years previously  Total years of hormone replacement therapy: none    Past Medical History:   HTN  HLD    Past Surgical History:    Past Surgical History:   Procedure Laterality Date    BREAST BIOPSY  ?    CHOLECYSTECTOMY      CHOLECYSTECTOMY WITH INTRAOPERATIVE CHOLANGIOGRAM N/A 2023    Procedure: Laparoscopic cholecystectomy with cholangiogram;  Surgeon: Dorina Rehman MD;  Location: [facility];  Service: General;  Laterality: N/A;    COLONOSCOPY N/A     NBIH otherwise normal-Dr. Hull    DENTAL PROCEDURE      implants AGE 60 LOST BABY  TEETH    DILATATION AND CURETTAGE  1988    EYE SURGERY  2021    KIDNEY STONE SURGERY      TUBAL ABDOMINAL LIGATION  1997    UMBILICAL HERNIA REPAIR N/A 12/29/2023    Procedure: Open periumbilical hernia repair with mesh;  Surgeon: Dorina Rehman MD;  Location: [facility] OR;  Service: General;  Laterality: N/A;    VENTRAL HERNIA REPAIR N/A 01/08/2025    Procedure: Laparoscopic robotic-assisted ventral/incisional hernia repair with mesh;  Surgeon: Dorina Rehman MD;  Location: [facility] OR;  Service: Robotics - DaVinci;  Laterality: N/A;    WISDOM TOOTH EXTRACTION         Family History:    As above    Social History:   Denies tobacco use  Occasional alcohol use    Allergies:   Allergies   Allergen Reactions    Other Rash     Trace Metals     Medications:     Current Outpatient Medications:     aspirin 81 MG EC tablet, Take 1 tablet by mouth Daily., Disp: 90 tablet, Rfl: 11    atorvastatin (LIPITOR) 10 MG tablet, TAKE 1 TABLET BY MOUTH DAILY. TO DECREASE RISK OF STROKE AND HEART ATTACK (Patient taking differently: Take 1 tablet by mouth Every Night. To decrease risk of stroke and heart attack), Disp: 90 tablet, Rfl: 3    CALCIUM CITRATE PO, Take 1 tablet by mouth Every Night. HOLD FOR SURGERY, Disp: , Rfl:     cephalexin (KEFLEX) 500 MG capsule, Take 1 capsule by mouth 4 (Four) Times a Day for 10 days., Disp: 40 capsule, Rfl: 0    cholecalciferol (VITAMIN D3) 1000 UNITS tablet, Take 1 tablet by mouth Every Night. HOLD FOR SURGERY, Disp: , Rfl:     coenzyme Q10 100 MG capsule, Take 1 capsule by mouth Daily., Disp: , Rfl:     DULoxetine (CYMBALTA) 60 MG capsule, TAKE 1 CAPSULE EVERY DAY (Patient taking differently: Take 1 capsule by mouth Every Night.), Disp: 90 capsule, Rfl: 1    hydroCHLOROthiazide 12.5 MG tablet, TAKE 1 TABLET EVERY DAY, Disp: 90 tablet, Rfl: 3    loratadine (CLARITIN) 10 MG tablet, TAKE 1 TABLET EVERY DAY (Patient taking differently: Take 1 tablet by mouth Every Night.), Disp: 90  tablet, Rfl: 3    metFORMIN ER (GLUCOPHAGE-XR) 500 MG 24 hr tablet, TAKE 1 TABLET EVERY DAY WITH BREAKFAST FOR PREDIABETES, Disp: 90 tablet, Rfl: 3    Labs:    Labs from 4/22/2025 personally reviewed by me     ROS:   Influenza-like illness: no fever, no  cough, no  sore throat, no  body aches, no loss of sense of taste or smell, no known exposure to person with Covid-19.  Constitutional: Negative for fevers or chills  HENT: Negative for hearing loss or runny nose  Eyes: Negative for vision changes or scleral icterus  Respiratory: Negative for cough or shortness of breath  Cardiovascular: Negative for chest pain or heart palpitations  Gastrointestinal: Negative for abdominal pain, nausea, vomiting, constipation, melena, or hematochezia  Genitourinary: Negative for hematuria or dysuria  Musculoskeletal: Negative for joint swelling or gait instability  Neurologic: Negative for tremors or seizures  Psychiatric: Negative for suicidal ideations or depression  All other systems reviewed and negative    PHYSICAL EXAM:   Constitutional: Well-developed well-nourished, no acute distress  Eyes: Conjunctiva normal, sclera nonicteric  ENMT: Hearing grossly normal, oral mucosa moist  Neck: Supple, no palpable mass, trachea midline  Respiratory: Clear to auscultation, normal inspiratory effort  Cardiovascular: Regular rate, no murmur, no peripheral edema, no jugular venous distention  Breast: symmetric  Right: No visible abnormalities on inspection while seated, with arms raised or hands on hips. No masses, skin changes, or nipple abnormalities.  Left:  No visible abnormalities on inspection while seated, with arms raised or hands on hips. No masses, skin changes, or nipple abnormalities.  Biopsy site appreciated in the right breast, otherwise no skin changes.   No clinical chest wall involvement.  Gastrointestinal: Soft, nontender  Lymphatics (palpable nodes): No cervical, supraclavicular or axillary lymphadenopathy  Skin:  Warm,  dry, no rash on visualized skin surfaces  Musculoskeletal: Symmetric strength, normal gait  Psychiatric: Alert and oriented ×3, normal affect     BILLY HUTCHINSON M.D.  General and Endoscopic Surgery  [facility]    [address], [address]  [place], [place], [address]  P: [phone]  F: [phone]

## 2025-06-18 RX ORDER — LORATADINE 10 MG/1
10 TABLET ORAL DAILY
Qty: 90 TABLET | Refills: 1 | Status: SHIPPED | OUTPATIENT
Start: 2025-06-18

## 2025-06-18 NOTE — TELEPHONE ENCOUNTER
Rx Refill Note  Requested Prescriptions     Pending Prescriptions Disp Refills    loratadine (CLARITIN) 10 MG tablet [Pharmacy Med Name: Loratadine Oral Tablet 10 MG] 90 tablet 1     Sig: TAKE 1 TABLET EVERY DAY      Last office visit with prescribing clinician: 1/31/2025   Last telemedicine visit with prescribing clinician: Visit date not found   Next office visit with prescribing clinician: 8/8/2025                         Would you like a call back once the refill request has been completed: [] Yes [] No    If the office needs to give you a call back, can they leave a voicemail: [] Yes [] No    Vincenzo Larson MA  06/18/25, 14:25 EDT

## 2025-07-28 ENCOUNTER — TELEPHONE (OUTPATIENT)
Dept: FAMILY MEDICINE CLINIC | Facility: CLINIC | Age: 70
End: 2025-07-28
Payer: MEDICARE

## 2025-07-29 ENCOUNTER — TELEPHONE (OUTPATIENT)
Dept: FAMILY MEDICINE CLINIC | Facility: CLINIC | Age: 70
End: 2025-07-29
Payer: MEDICARE

## 2025-07-29 DIAGNOSIS — Z00.00 ANNUAL PHYSICAL EXAM: Primary | ICD-10-CM

## 2025-07-29 DIAGNOSIS — Z00.00 ANNUAL WELLNESS VISIT: ICD-10-CM

## 2025-07-29 DIAGNOSIS — R73.03 PREDIABETES: ICD-10-CM

## 2025-07-29 DIAGNOSIS — E55.9 VITAMIN D DEFICIENCY: ICD-10-CM

## 2025-07-31 RX ORDER — DULOXETIN HYDROCHLORIDE 60 MG/1
60 CAPSULE, DELAYED RELEASE ORAL DAILY
Qty: 90 CAPSULE | Refills: 3 | Status: SHIPPED | OUTPATIENT
Start: 2025-07-31

## 2025-07-31 RX ORDER — ATORVASTATIN CALCIUM 10 MG/1
10 TABLET, FILM COATED ORAL DAILY
Qty: 90 TABLET | Refills: 3 | Status: SHIPPED | OUTPATIENT
Start: 2025-07-31

## 2025-07-31 NOTE — TELEPHONE ENCOUNTER
Rx Refill Note  Requested Prescriptions     Pending Prescriptions Disp Refills    atorvastatin (LIPITOR) 10 MG tablet [Pharmacy Med Name: ATORVASTATIN CALCIUM 10 MG Oral Tablet] 90 tablet 3     Sig: TAKE 1 TABLET BY MOUTH DAILY. TO DECREASE RISK OF STROKE AND HEART ATTACK    DULoxetine (CYMBALTA) 60 MG capsule [Pharmacy Med Name: DULOXETINE HYDROCHLORIDEDR 60 MG Oral Capsule Delayed Release Particles] 90 capsule 3     Sig: TAKE 1 CAPSULE EVERY DAY      Last office visit with prescribing clinician: 1/31/2025   Last telemedicine visit with prescribing clinician: Visit date not found   Next office visit with prescribing clinician: 8/8/2025                         Would you like a call back once the refill request has been completed: [] Yes [] No    If the office needs to give you a call back, can they leave a voicemail: [] Yes [] No    Jennifer Mejia MA  07/31/25, 14:59 EDT

## 2025-08-01 ENCOUNTER — PATIENT ROUNDING (BHMG ONLY) (OUTPATIENT)
Dept: FAMILY MEDICINE CLINIC | Facility: CLINIC | Age: 70
End: 2025-08-01
Payer: MEDICARE

## 2025-08-01 NOTE — PROGRESS NOTES
A My-Chart message has been sent to the patient for PATIENT ROUNDING with [facility]

## 2025-08-01 NOTE — PROGRESS NOTES
A My-Chart message has been sent to the patient for PATIENT ROUNDING with [facility]

## 2025-08-08 ENCOUNTER — OFFICE VISIT (OUTPATIENT)
Dept: FAMILY MEDICINE CLINIC | Facility: CLINIC | Age: 70
End: 2025-08-08
Payer: MEDICARE

## 2025-08-08 VITALS
WEIGHT: 228.1 LBS | HEIGHT: 65 IN | SYSTOLIC BLOOD PRESSURE: 126 MMHG | OXYGEN SATURATION: 97 % | HEART RATE: 88 BPM | BODY MASS INDEX: 38 KG/M2 | DIASTOLIC BLOOD PRESSURE: 74 MMHG

## 2025-08-08 DIAGNOSIS — E78.5 HYPERLIPIDEMIA, UNSPECIFIED HYPERLIPIDEMIA TYPE: ICD-10-CM

## 2025-08-08 DIAGNOSIS — F41.8 MIXED ANXIETY DEPRESSIVE DISORDER: ICD-10-CM

## 2025-08-08 DIAGNOSIS — N89.8 VAGINA ITCHING: ICD-10-CM

## 2025-08-08 DIAGNOSIS — E55.9 VITAMIN D DEFICIENCY: Primary | ICD-10-CM

## 2025-08-08 DIAGNOSIS — R73.03 PREDIABETES: ICD-10-CM

## 2025-08-08 DIAGNOSIS — N95.2 VAGINAL ATROPHY: ICD-10-CM

## 2025-08-08 RX ORDER — CLOTRIMAZOLE AND BETAMETHASONE DIPROPIONATE 10; .64 MG/G; MG/G
1 CREAM TOPICAL 2 TIMES DAILY
Qty: 45 G | Refills: 0 | Status: SHIPPED | OUTPATIENT
Start: 2025-08-08

## (undated) DEVICE — GLV SURG SENSICARE POLYISPRN W/ALOE PF LF 6.5 GRN STRL

## (undated) DEVICE — SUT VIC 0/0 UR6 27IN DYED J603H

## (undated) DEVICE — GLV SURG BIOGEL LTX PF 6 1/2

## (undated) DEVICE — ADHS SKIN SURG TISS VISC PREMIERPRO EXOFIN HI/VISC FAST/DRY

## (undated) DEVICE — CATH IV INSYTE AUTOGARD 14G 1 1/2IN ORNG

## (undated) DEVICE — GLV SURG BIOGEL LTX PF 6

## (undated) DEVICE — ENDOPATH XCEL BLUNT TIP TROCARS WITH SMOOTH SLEEVES: Brand: ENDOPATH XCEL

## (undated) DEVICE — CONTAINER,SPECIMEN,OR STERILE,4OZ: Brand: MEDLINE

## (undated) DEVICE — ANTIBACTERIAL UNDYED BRAIDED (POLYGLACTIN 910), SYNTHETIC ABSORBABLE SUTURE: Brand: COATED VICRYL

## (undated) DEVICE — APPL CHLORAPREP HI/LITE 26ML ORNG

## (undated) DEVICE — SYR LL TP 10ML STRL

## (undated) DEVICE — PATIENT RETURN ELECTRODE, SINGLE-USE, CONTACT QUALITY MONITORING, ADULT, WITH 9FT CORD, FOR PATIENTS WEIGING OVER 33LBS. (15KG): Brand: MEGADYNE

## (undated) DEVICE — BNDR ABD PREMIUM/UNIV 10IN 27TO48IN

## (undated) DEVICE — MARKER,SKIN,WI/RULER AND LABELS: Brand: MEDLINE

## (undated) DEVICE — EXTENSION SET, MALE LUER LOCK ADAPTER WITH RETRACTABLE COLLAR

## (undated) DEVICE — ST TBG AIRSEAL BIF FLTR W/ACT/CHARCOAL/FLTR

## (undated) DEVICE — SUT VIC 0 UR6 27IN VCP603H

## (undated) DEVICE — LOU MINOR PROCEDURE: Brand: MEDLINE INDUSTRIES, INC.

## (undated) DEVICE — SUT ETHIB 0 CT2 CR8 18IN CX27D

## (undated) DEVICE — ENDOPATH XCEL BLADELESS TROCARS WITH STABILITY SLEEVES: Brand: ENDOPATH XCEL

## (undated) DEVICE — GOWN,SIRUS,NON REINFRCD,LARGE,SET IN SL: Brand: MEDLINE

## (undated) DEVICE — LAPAROVUE VISIBILITY SYSTEM LAPAROSCOPIC SOLUTIONS: Brand: LAPAROVUE

## (undated) DEVICE — LAPAROSCOPIC SMOKE FILTRATION SYSTEM: Brand: PALL LAPAROSHIELD® PLUS LAPAROSCOPIC SMOKE FILTRATION SYSTEM

## (undated) DEVICE — DRAPE,REIN 53X77,STERILE: Brand: MEDLINE

## (undated) DEVICE — DRP C/ARM 41X74IN

## (undated) DEVICE — NDL HYPO PRECISIONGLIDE REG 25G 1 1/2

## (undated) DEVICE — COLUMN DRAPE

## (undated) DEVICE — THE STERILE LIGHT HANDLE COVER IS USED WITH STERIS SURGICAL LIGHTING AND VISUALIZATION SYSTEMS.

## (undated) DEVICE — ENDOCUT SCISSOR TIP, DISPOSABLE: Brand: RENEW

## (undated) DEVICE — STPCK 3WY D201 DISCOFIX

## (undated) DEVICE — 1LYRTR 16FR10ML100%SIL UMS SNP: Brand: MEDLINE INDUSTRIES, INC.

## (undated) DEVICE — SOL NACL 0.9PCT 1000ML

## (undated) DEVICE — DISPOSABLE MONOPOLAR ENDOSCOPIC CORD 10 FT. (3M): Brand: KIRWAN

## (undated) DEVICE — BLADELESS OBTURATOR: Brand: WECK VISTA

## (undated) DEVICE — GAUZE,SPONGE,4"X4",16PLY,XRAY,STRL,LF: Brand: MEDLINE

## (undated) DEVICE — ENDOPATH XCEL UNIVERSAL TROCAR STABLILITY SLEEVES: Brand: ENDOPATH XCEL

## (undated) DEVICE — ENDOPOUCH RETRIEVER SPECIMEN RETRIEVAL BAGS: Brand: ENDOPOUCH RETRIEVER

## (undated) DEVICE — TIP COVER ACCESSORY

## (undated) DEVICE — SEAL

## (undated) DEVICE — COVER,MAYO STAND,STERILE: Brand: MEDLINE

## (undated) DEVICE — TBG PENCL TELESCP MEGADYNE SMOKE EVAC 10FT

## (undated) DEVICE — ARM DRAPE

## (undated) DEVICE — CATH CHOLANG 4.5F18IN BRGNDY

## (undated) DEVICE — THE DEVICE IS A DISPOSABLE, LIGATURE PASSING, SUTURING APPARATUS AND NEEDLE GUIDE FOR THE ABDOMINAL WALL WHICH IS NON-POWERED, HAND-HELD, AND HAND-MANIPULATED,INTENDED TO BE USED IN VARIOUS GENERAL SURGICAL PROCEDURES. THE DEVICE INCLUDES A LIGATURE CARRIER PATHWAY, NEEDLE GUIDE, TWO NEEDLES, REFERENCE PLANE T-BAR, AND A GUIDEWIRE. THE HANDLE OF THE DEVICE PROVIDES TWO DIAMETRICALLY OPPOSED ENCLOSED GUIDEWAYS FOR THE ADVANCEMENT AND RETRACTION OF THE NEEDLES UNDER MANUAL CONTROL OF A PLUNGER LOCATED AT THE PROXIMAL END OF THE DEVICE.AS PART OF THE M-CLOSE CONVENIENCE KIT, A NERVE BLOCK NEEDLE IS INCLUDED FOR THE ADMINISTRATION OF LOCAL ANESTHETIC AGENTS TO PROVIDE REGIONAL AND LOCAL ANESTHESIA.  A TELFA ANTIMICROBIAL, NON-ADHERENT PAD IS ALSO PROVIDED IN THE KIT FOR USE AS A PRIMARY DRESSING FOR THE SURGICAL INCISION.: Brand: M-CLOSE KIT

## (undated) DEVICE — LOU LAP CHOLE: Brand: MEDLINE INDUSTRIES, INC.

## (undated) DEVICE — TROC BLADLES AIRSEAL/OPTI THRD 8X120MM 1P/U